# Patient Record
Sex: MALE | Race: WHITE | NOT HISPANIC OR LATINO | Employment: OTHER | URBAN - METROPOLITAN AREA
[De-identification: names, ages, dates, MRNs, and addresses within clinical notes are randomized per-mention and may not be internally consistent; named-entity substitution may affect disease eponyms.]

---

## 2017-07-28 ENCOUNTER — OFFICE VISIT (OUTPATIENT)
Dept: CARDIOLOGY | Facility: CLINIC | Age: 77
End: 2017-07-28

## 2017-07-28 VITALS
HEIGHT: 70 IN | BODY MASS INDEX: 33.79 KG/M2 | DIASTOLIC BLOOD PRESSURE: 68 MMHG | WEIGHT: 236 LBS | SYSTOLIC BLOOD PRESSURE: 118 MMHG | HEART RATE: 83 BPM

## 2017-07-28 DIAGNOSIS — I10 ESSENTIAL HYPERTENSION: ICD-10-CM

## 2017-07-28 DIAGNOSIS — E78.2 MIXED HYPERLIPIDEMIA: ICD-10-CM

## 2017-07-28 DIAGNOSIS — I20.9 ANGINA PECTORIS (HCC): Primary | ICD-10-CM

## 2017-07-28 PROCEDURE — 99214 OFFICE O/P EST MOD 30 MIN: CPT | Performed by: INTERNAL MEDICINE

## 2017-07-28 NOTE — PROGRESS NOTES
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    Chief Complaint   Patient presents with   • Coronary Artery Disease   • Hypertension       PROBLEM LIST:   1. Coronary artery disease:  a. January 2005: Stress Cardiolite Samuel Mathias with fixed inferior defect, diaphragmatic attenuation could not be ruled out, 11 minutes 43 seconds via Thom protocol. EF 63%.   b. August 2006: Left heart catheterization with PCI and stenting, 99% LAD stenosis, 3.0x28 mm CYPHER drug-eluting stent post-dilated to 3.5 mm by Bebeto Zhu and Shi, nonobstructive disease otherwise, mild flow limitation of jailed diagonal.  c. February 2012 - MPS per [facility] with normal perfusion and ejection fraction preserved.  2. Dyslipidemia:  a. April 2012 - Total cholesterol 104, triglycerides 59, HDL 45, LDL 47. Normal LFTs.   b. April 2013 - Total cholesterol 115, triglycerides 91, HDL 39, LDL 58.  3. Shortness of breath:  a. PFTs within normal limits, 2007.   4. Hypertension, presumed essential.  5. 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  6. Arthritis not otherwise specified.  7. Prostatism, recent implementation of alpha blockade.  8. Gastroesophageal reflux disease.  9. Mildly elevated transaminases.  10. Surgical history:  a. Left rotator cuff repair 1990.  b. Herniorrhaphy 1988.   c. Arthroscopic knee surgery.  d. Right rotator cuff repair, June 2015.    Allergies  Allergies   Allergen Reactions   • Other      Dual antiplatelet therapy, spontaneous bruising as of June 2010       Current Medications    Current Outpatient Prescriptions:   •  amLODIPine (NORVASC) 5 MG tablet, Take  by mouth 2 (two) times a day. 5 mg QAM 10 mg QHS, Disp: , Rfl:   •  aspirin 325 MG tablet, Take 325 mg by mouth daily., Disp: , Rfl:   •  finasteride (PROSCAR) 5 MG tablet, Take 5 mg by mouth daily., Disp: , Rfl:   •  ketotifen  "(ZADITOR) 0.025 % ophthalmic solution, 1 drop as needed., Disp: , Rfl:   •  Loratadine 10 MG capsule, Take  by mouth as needed., Disp: , Rfl:   •  methylcellulose oral powder, Take  by mouth daily., Disp: , Rfl:   •  Multiple Vitamins-Minerals (MULTIVITAMIN ADULT PO), Take  by mouth daily., Disp: , Rfl:   •  ramipril (ALTACE) 10 MG capsule, TAKE ONE CAPSULE BY MOUTH EVERY DAY, Disp: 90 capsule, Rfl: 3  •  ranitidine (ZANTAC) 150 MG tablet, Take 150 mg by mouth every night., Disp: , Rfl:   •  simvastatin (ZOCOR) 10 MG tablet, TAKE 1 TABLET DAILY., Disp: 90 tablet, Rfl: 3  •  tamsulosin (FLOMAX) 0.4 MG capsule 24 hr capsule, Take 1 capsule by mouth every night., Disp: , Rfl:     History of Present Illness     Patient presents in follow up. Increased beckman and some chest fullness w exercise.  Has stopped exercising and playing golf.  No ntg use.  ROS:  All systems have been reviewed and are negative with the exception of those mentioned in the HPI.    OBJECTIVE:  Vitals:    07/28/17 1544   BP: 118/68   BP Location: Right arm   Patient Position: Sitting   Pulse: 83   Weight: 236 lb (107 kg)   Height: 70\" (177.8 cm)     Physical Exam   Constitutional: He is oriented to person, place, and time. He appears well-developed and well-nourished. No distress.   Neck: Normal range of motion. Neck supple. No hepatojugular reflux and no JVD present. Carotid bruit is not present. No tracheal deviation present. No thyromegaly present.   Cardiovascular: Normal rate, regular rhythm, S1 normal, S2 normal, intact distal pulses and normal pulses.  PMI is not displaced.  Exam reveals no gallop, no distant heart sounds, no friction rub, no midsystolic click and no opening snap.    No murmur heard.  Pulses:       Radial pulses are 2+ on the right side, and 2+ on the left side.        Dorsalis pedis pulses are 2+ on the right side, and 2+ on the left side.        Posterior tibial pulses are 2+ on the right side, and 2+ on the left side. "   Pulmonary/Chest: Effort normal and breath sounds normal. He has no wheezes. He has no rales.   Abdominal: Soft. Bowel sounds are normal. He exhibits no mass. There is no tenderness. There is no guarding.   Musculoskeletal: Normal range of motion. He exhibits no edema or tenderness.   Neurological: He is alert and oriented to person, place, and time.   Psychiatric: He has a normal mood and affect. His behavior is normal.   Nursing note and vitals reviewed.      Diagnostic Data:  Procedures      ASSESSMENT:   Diagnosis Plan   1. Angina pectoris     2. Essential hypertension     3. Mixed hyperlipidemia         PLAN:  1. anginal equivalent Ischemic eval with stress testing.  2. Well controlled today and at home per report. Continue current regimen.  3. Request most recent lipid panel from Dr. Dickey for continuity. Continue statin therapy.    Markus Zhu MD , I independently performed the history and physical examination and developed the assessment and plan for this visit.    Markus Dickey MD, thank you for referring Mr. Newell for evaluation.  I have forwarded my electronically generated recommendations to you for review.  Please do not hesitate to call with any questions.      Markus Zhu MD, FACC

## 2017-08-22 ENCOUNTER — HOSPITAL ENCOUNTER (OUTPATIENT)
Dept: CARDIOLOGY | Facility: HOSPITAL | Age: 77
Discharge: HOME | End: 2017-08-22
Attending: INTERNAL MEDICINE

## 2017-08-22 ENCOUNTER — HOSPITAL ENCOUNTER (OUTPATIENT)
Dept: CARDIOLOGY | Facility: HOSPITAL | Age: 77
Discharge: HOME | End: 2017-08-22
Attending: INTERNAL MEDICINE | Admitting: INTERNAL MEDICINE

## 2017-08-22 VITALS — WEIGHT: 230 LBS | BODY MASS INDEX: 32.93 KG/M2 | HEIGHT: 70 IN

## 2017-08-22 DIAGNOSIS — I20.9 ANGINA PECTORIS (HCC): ICD-10-CM

## 2017-08-22 LAB
BH CV STRESS BP STAGE 1: NORMAL
BH CV STRESS BP STAGE 2: NORMAL
BH CV STRESS BP STAGE 3: NORMAL
BH CV STRESS DURATION MIN STAGE 1: 3
BH CV STRESS DURATION MIN STAGE 2: 3
BH CV STRESS DURATION MIN STAGE 3: 1
BH CV STRESS DURATION SEC STAGE 1: 0
BH CV STRESS DURATION SEC STAGE 2: 0
BH CV STRESS DURATION SEC STAGE 3: 11
BH CV STRESS GRADE STAGE 1: 10
BH CV STRESS GRADE STAGE 2: 12
BH CV STRESS GRADE STAGE 3: 14
BH CV STRESS HR STAGE 1: 116
BH CV STRESS HR STAGE 2: 25
BH CV STRESS HR STAGE 3: 130
BH CV STRESS METS STAGE 1: 5
BH CV STRESS METS STAGE 2: 7.5
BH CV STRESS METS STAGE 3: 10
BH CV STRESS PROTOCOL 1: NORMAL
BH CV STRESS RECOVERY BP: NORMAL MMHG
BH CV STRESS RECOVERY HR: 101 BPM
BH CV STRESS SPEED STAGE 1: 1.7
BH CV STRESS SPEED STAGE 2: 2.5
BH CV STRESS SPEED STAGE 3: 3.4
BH CV STRESS STAGE 1: 1
BH CV STRESS STAGE 2: 2
BH CV STRESS STAGE 3: 3
LV EF NUC BP: 70 %
MAXIMAL PREDICTED HEART RATE: 143 BPM
PERCENT MAX PREDICTED HR: 90.91 %
STRESS BASELINE BP: NORMAL MMHG
STRESS BASELINE HR: 87 BPM
STRESS PERCENT HR: 107 %
STRESS POST ESTIMATED WORKLOAD: 8.8 METS
STRESS POST EXERCISE DUR MIN: 7 MIN
STRESS POST EXERCISE DUR SEC: 11 SEC
STRESS POST PEAK BP: NORMAL MMHG
STRESS POST PEAK HR: 130 BPM
STRESS TARGET HR: 122 BPM

## 2017-08-22 PROCEDURE — 0 TECHNETIUM SESTAMIBI: Performed by: INTERNAL MEDICINE

## 2017-08-22 PROCEDURE — 93017 CV STRESS TEST TRACING ONLY: CPT

## 2017-08-22 PROCEDURE — 78452 HT MUSCLE IMAGE SPECT MULT: CPT | Performed by: INTERNAL MEDICINE

## 2017-08-22 PROCEDURE — A9500 TC99M SESTAMIBI: HCPCS | Performed by: INTERNAL MEDICINE

## 2017-08-22 PROCEDURE — 93018 CV STRESS TEST I&R ONLY: CPT | Performed by: INTERNAL MEDICINE

## 2017-08-22 PROCEDURE — 78452 HT MUSCLE IMAGE SPECT MULT: CPT

## 2017-08-22 RX ADMIN — TECHNETIUM TC-99M SESTAMIBI 1 DOSE: 1 INJECTION INTRAVENOUS at 07:35

## 2017-08-22 RX ADMIN — TECHNETIUM TC-99M SESTAMIBI 1 DOSE: 1 INJECTION INTRAVENOUS at 09:10

## 2017-08-23 DIAGNOSIS — R94.39 ABNORMAL MYOCARDIAL PERFUSION STUDY: Primary | ICD-10-CM

## 2017-08-24 ENCOUNTER — PREP FOR SURGERY (OUTPATIENT)
Dept: OTHER | Facility: HOSPITAL | Age: 77
End: 2017-08-24

## 2017-08-24 DIAGNOSIS — I20.9 ANGINA PECTORIS (HCC): Primary | ICD-10-CM

## 2017-08-24 PROBLEM — R94.39 ABNORMAL MYOCARDIAL PERFUSION STUDY: Status: ACTIVE | Noted: 2017-08-24

## 2017-08-24 RX ORDER — SODIUM CHLORIDE 0.9 % (FLUSH) 0.9 %
1-10 SYRINGE (ML) INJECTION AS NEEDED
Status: CANCELLED | OUTPATIENT
Start: 2017-08-24

## 2017-08-24 RX ORDER — ASPIRIN 325 MG
325 TABLET ORAL ONCE
Status: CANCELLED | OUTPATIENT
Start: 2017-08-24 | End: 2017-08-24

## 2017-08-24 RX ORDER — NITROGLYCERIN 0.4 MG/1
0.4 TABLET SUBLINGUAL
Status: CANCELLED | OUTPATIENT
Start: 2017-08-24

## 2017-08-24 RX ORDER — ASPIRIN 325 MG
325 TABLET, DELAYED RELEASE (ENTERIC COATED) ORAL DAILY
Status: CANCELLED | OUTPATIENT
Start: 2017-08-25

## 2017-08-24 RX ORDER — ACETAMINOPHEN 325 MG/1
650 TABLET ORAL EVERY 4 HOURS PRN
Status: CANCELLED | OUTPATIENT
Start: 2017-08-24

## 2017-08-24 RX ORDER — ONDANSETRON 2 MG/ML
4 INJECTION INTRAMUSCULAR; INTRAVENOUS EVERY 6 HOURS PRN
Status: CANCELLED | OUTPATIENT
Start: 2017-08-24

## 2017-08-28 ENCOUNTER — HOSPITAL ENCOUNTER (OUTPATIENT)
Facility: HOSPITAL | Age: 77
Discharge: HOME | End: 2017-08-29
Attending: INTERNAL MEDICINE | Admitting: INTERNAL MEDICINE

## 2017-08-28 DIAGNOSIS — R94.39 ABNORMAL MYOCARDIAL PERFUSION STUDY: ICD-10-CM

## 2017-08-28 LAB
ACT BLD: 312 SECONDS (ref 82–152)
ALBUMIN SERPL-MCNC: 4.2 G/DL (ref 3.2–4.8)
ALBUMIN/GLOB SERPL: 1.6 G/DL (ref 1.5–2.5)
ALP SERPL-CCNC: 95 U/L (ref 25–100)
ALT SERPL W P-5'-P-CCNC: 68 U/L (ref 7–40)
ANION GAP SERPL CALCULATED.3IONS-SCNC: 6 MMOL/L (ref 3–11)
ARTICHOKE IGE QN: 66 MG/DL (ref 0–130)
AST SERPL-CCNC: 39 U/L (ref 0–33)
BASOPHILS # BLD AUTO: 0.04 10*3/MM3 (ref 0–0.2)
BASOPHILS NFR BLD AUTO: 0.4 % (ref 0–1)
BILIRUB SERPL-MCNC: 0.7 MG/DL (ref 0.3–1.2)
BUN BLD-MCNC: 9 MG/DL (ref 9–23)
BUN/CREAT SERPL: 11.3 (ref 7–25)
CALCIUM SPEC-SCNC: 9.1 MG/DL (ref 8.7–10.4)
CHLORIDE SERPL-SCNC: 107 MMOL/L (ref 99–109)
CHOLEST SERPL-MCNC: 125 MG/DL (ref 0–200)
CO2 SERPL-SCNC: 23 MMOL/L (ref 20–31)
CREAT BLD-MCNC: 0.8 MG/DL (ref 0.6–1.3)
DEPRECATED RDW RBC AUTO: 42.3 FL (ref 37–54)
EOSINOPHIL # BLD AUTO: 0.2 10*3/MM3 (ref 0–0.3)
EOSINOPHIL NFR BLD AUTO: 2.2 % (ref 0–3)
ERYTHROCYTE [DISTWIDTH] IN BLOOD BY AUTOMATED COUNT: 12.8 % (ref 11.3–14.5)
GFR SERPL CREATININE-BSD FRML MDRD: 94 ML/MIN/1.73
GLOBULIN UR ELPH-MCNC: 2.6 GM/DL
GLUCOSE BLD-MCNC: 134 MG/DL (ref 70–100)
HBA1C MFR BLD: 6.4 % (ref 4.8–5.6)
HCT VFR BLD AUTO: 45.7 % (ref 38.9–50.9)
HDLC SERPL-MCNC: 41 MG/DL (ref 40–60)
HGB BLD-MCNC: 15.9 G/DL (ref 13.1–17.5)
IMM GRANULOCYTES # BLD: 0.05 10*3/MM3 (ref 0–0.03)
IMM GRANULOCYTES NFR BLD: 0.6 % (ref 0–0.6)
LYMPHOCYTES # BLD AUTO: 1.61 10*3/MM3 (ref 0.6–4.8)
LYMPHOCYTES NFR BLD AUTO: 17.9 % (ref 24–44)
MCH RBC QN AUTO: 31.5 PG (ref 27–31)
MCHC RBC AUTO-ENTMCNC: 34.8 G/DL (ref 32–36)
MCV RBC AUTO: 90.5 FL (ref 80–99)
MONOCYTES # BLD AUTO: 0.84 10*3/MM3 (ref 0–1)
MONOCYTES NFR BLD AUTO: 9.3 % (ref 0–12)
NEUTROPHILS # BLD AUTO: 6.27 10*3/MM3 (ref 1.5–8.3)
NEUTROPHILS NFR BLD AUTO: 69.6 % (ref 41–71)
PLATELET # BLD AUTO: 197 10*3/MM3 (ref 150–450)
PMV BLD AUTO: 9.9 FL (ref 6–12)
POTASSIUM BLD-SCNC: 3.7 MMOL/L (ref 3.5–5.5)
PROT SERPL-MCNC: 6.8 G/DL (ref 5.7–8.2)
RBC # BLD AUTO: 5.05 10*6/MM3 (ref 4.2–5.76)
SODIUM BLD-SCNC: 136 MMOL/L (ref 132–146)
TRIGL SERPL-MCNC: 102 MG/DL (ref 0–150)
WBC NRBC COR # BLD: 9.01 10*3/MM3 (ref 3.5–10.8)

## 2017-08-28 PROCEDURE — C1725 CATH, TRANSLUMIN NON-LASER: HCPCS | Performed by: INTERNAL MEDICINE

## 2017-08-28 PROCEDURE — S0260 H&P FOR SURGERY: HCPCS | Performed by: INTERNAL MEDICINE

## 2017-08-28 PROCEDURE — C1887 CATHETER, GUIDING: HCPCS | Performed by: INTERNAL MEDICINE

## 2017-08-28 PROCEDURE — 36415 COLL VENOUS BLD VENIPUNCTURE: CPT

## 2017-08-28 PROCEDURE — C1769 GUIDE WIRE: HCPCS | Performed by: INTERNAL MEDICINE

## 2017-08-28 PROCEDURE — G0378 HOSPITAL OBSERVATION PER HR: HCPCS

## 2017-08-28 PROCEDURE — 83036 HEMOGLOBIN GLYCOSYLATED A1C: CPT | Performed by: PHYSICIAN ASSISTANT

## 2017-08-28 PROCEDURE — 25010000002 MIDAZOLAM PER 1 MG: Performed by: INTERNAL MEDICINE

## 2017-08-28 PROCEDURE — 80053 COMPREHEN METABOLIC PANEL: CPT | Performed by: INTERNAL MEDICINE

## 2017-08-28 PROCEDURE — C1874 STENT, COATED/COV W/DEL SYS: HCPCS | Performed by: INTERNAL MEDICINE

## 2017-08-28 PROCEDURE — 92928 PRQ TCAT PLMT NTRAC ST 1 LES: CPT | Performed by: INTERNAL MEDICINE

## 2017-08-28 PROCEDURE — C9600 PERC DRUG-EL COR STENT SING: HCPCS | Performed by: INTERNAL MEDICINE

## 2017-08-28 PROCEDURE — 0 IOPAMIDOL PER 1 ML: Performed by: INTERNAL MEDICINE

## 2017-08-28 PROCEDURE — 93458 L HRT ARTERY/VENTRICLE ANGIO: CPT | Performed by: INTERNAL MEDICINE

## 2017-08-28 PROCEDURE — 85347 COAGULATION TIME ACTIVATED: CPT

## 2017-08-28 PROCEDURE — 25010000002 HEPARIN (PORCINE) PER 1000 UNITS: Performed by: INTERNAL MEDICINE

## 2017-08-28 PROCEDURE — 85025 COMPLETE CBC W/AUTO DIFF WBC: CPT | Performed by: INTERNAL MEDICINE

## 2017-08-28 PROCEDURE — C1894 INTRO/SHEATH, NON-LASER: HCPCS | Performed by: INTERNAL MEDICINE

## 2017-08-28 PROCEDURE — 80061 LIPID PANEL: CPT | Performed by: PHYSICIAN ASSISTANT

## 2017-08-28 DEVICE — XIENCE ALPINE EVEROLIMUS ELUTING CORONARY STENT SYSTEM 3.50 MM X 23 MM / RAPID-EXCHANGE
Type: IMPLANTABLE DEVICE | Status: FUNCTIONAL
Brand: XIENCE ALPINE

## 2017-08-28 DEVICE — XIENCE ALPINE EVEROLIMUS ELUTING CORONARY STENT SYSTEM 3.00 MM X 38 MM / RAPID-EXCHANGE
Type: IMPLANTABLE DEVICE | Status: FUNCTIONAL
Brand: XIENCE ALPINE

## 2017-08-28 RX ORDER — ASPIRIN 325 MG
325 TABLET ORAL NIGHTLY
Status: DISCONTINUED | OUTPATIENT
Start: 2017-08-28 | End: 2017-08-29 | Stop reason: HOSPADM

## 2017-08-28 RX ORDER — CLOPIDOGREL BISULFATE 75 MG/1
75 TABLET ORAL DAILY
Status: DISCONTINUED | OUTPATIENT
Start: 2017-08-29 | End: 2017-08-29 | Stop reason: HOSPADM

## 2017-08-28 RX ORDER — LIDOCAINE HYDROCHLORIDE 10 MG/ML
INJECTION, SOLUTION INFILTRATION; PERINEURAL AS NEEDED
Status: DISCONTINUED | OUTPATIENT
Start: 2017-08-28 | End: 2017-08-28 | Stop reason: HOSPADM

## 2017-08-28 RX ORDER — SODIUM CHLORIDE 0.9 % (FLUSH) 0.9 %
1-10 SYRINGE (ML) INJECTION AS NEEDED
Status: DISCONTINUED | OUTPATIENT
Start: 2017-08-28 | End: 2017-08-28 | Stop reason: HOSPADM

## 2017-08-28 RX ORDER — TAMSULOSIN HYDROCHLORIDE 0.4 MG/1
0.4 CAPSULE ORAL NIGHTLY
Status: DISCONTINUED | OUTPATIENT
Start: 2017-08-28 | End: 2017-08-29 | Stop reason: HOSPADM

## 2017-08-28 RX ORDER — ATORVASTATIN CALCIUM 10 MG/1
10 TABLET, FILM COATED ORAL NIGHTLY
Status: DISCONTINUED | OUTPATIENT
Start: 2017-08-28 | End: 2017-08-29 | Stop reason: HOSPADM

## 2017-08-28 RX ORDER — FINASTERIDE 5 MG/1
5 TABLET, FILM COATED ORAL NIGHTLY
Status: DISCONTINUED | OUTPATIENT
Start: 2017-08-28 | End: 2017-08-29 | Stop reason: HOSPADM

## 2017-08-28 RX ORDER — CARVEDILOL 3.12 MG/1
3.12 TABLET ORAL 2 TIMES DAILY
Status: DISCONTINUED | OUTPATIENT
Start: 2017-08-28 | End: 2017-08-29 | Stop reason: HOSPADM

## 2017-08-28 RX ORDER — ASPIRIN 325 MG
325 TABLET ORAL ONCE
Status: COMPLETED | OUTPATIENT
Start: 2017-08-28 | End: 2017-08-28

## 2017-08-28 RX ORDER — FAMOTIDINE 20 MG/1
20 TABLET, FILM COATED ORAL EVERY 12 HOURS SCHEDULED
Status: DISCONTINUED | OUTPATIENT
Start: 2017-08-28 | End: 2017-08-29 | Stop reason: HOSPADM

## 2017-08-28 RX ORDER — SODIUM CHLORIDE 9 MG/ML
100 INJECTION, SOLUTION INTRAVENOUS CONTINUOUS
Status: ACTIVE | OUTPATIENT
Start: 2017-08-28 | End: 2017-08-28

## 2017-08-28 RX ORDER — ASPIRIN 325 MG
325 TABLET, DELAYED RELEASE (ENTERIC COATED) ORAL DAILY
Status: DISCONTINUED | OUTPATIENT
Start: 2017-08-29 | End: 2017-08-28 | Stop reason: HOSPADM

## 2017-08-28 RX ORDER — NITROGLYCERIN 0.4 MG/1
0.4 TABLET SUBLINGUAL
Status: DISCONTINUED | OUTPATIENT
Start: 2017-08-28 | End: 2017-08-28 | Stop reason: HOSPADM

## 2017-08-28 RX ORDER — CLOPIDOGREL BISULFATE 75 MG/1
TABLET ORAL AS NEEDED
Status: DISCONTINUED | OUTPATIENT
Start: 2017-08-28 | End: 2017-08-28 | Stop reason: HOSPADM

## 2017-08-28 RX ORDER — RAMIPRIL 5 MG/1
10 CAPSULE ORAL NIGHTLY
Status: DISCONTINUED | OUTPATIENT
Start: 2017-08-28 | End: 2017-08-29 | Stop reason: HOSPADM

## 2017-08-28 RX ORDER — MIDAZOLAM HYDROCHLORIDE 1 MG/ML
INJECTION INTRAMUSCULAR; INTRAVENOUS AS NEEDED
Status: DISCONTINUED | OUTPATIENT
Start: 2017-08-28 | End: 2017-08-28 | Stop reason: HOSPADM

## 2017-08-28 RX ORDER — ONDANSETRON 2 MG/ML
4 INJECTION INTRAMUSCULAR; INTRAVENOUS EVERY 6 HOURS PRN
Status: DISCONTINUED | OUTPATIENT
Start: 2017-08-28 | End: 2017-08-28 | Stop reason: HOSPADM

## 2017-08-28 RX ORDER — ACETAMINOPHEN 325 MG/1
650 TABLET ORAL EVERY 4 HOURS PRN
Status: DISCONTINUED | OUTPATIENT
Start: 2017-08-28 | End: 2017-08-29 | Stop reason: HOSPADM

## 2017-08-28 RX ORDER — HEPARIN SODIUM 1000 [USP'U]/ML
INJECTION, SOLUTION INTRAVENOUS; SUBCUTANEOUS AS NEEDED
Status: DISCONTINUED | OUTPATIENT
Start: 2017-08-28 | End: 2017-08-28 | Stop reason: HOSPADM

## 2017-08-28 RX ORDER — ACETAMINOPHEN 325 MG/1
650 TABLET ORAL EVERY 4 HOURS PRN
Status: DISCONTINUED | OUTPATIENT
Start: 2017-08-28 | End: 2017-08-28 | Stop reason: HOSPADM

## 2017-08-28 RX ADMIN — FAMOTIDINE 20 MG: 20 TABLET, FILM COATED ORAL at 12:57

## 2017-08-28 RX ADMIN — SODIUM CHLORIDE 100 ML/HR: 9 INJECTION, SOLUTION INTRAVENOUS at 12:43

## 2017-08-28 RX ADMIN — ATORVASTATIN CALCIUM 10 MG: 10 TABLET, FILM COATED ORAL at 20:41

## 2017-08-28 RX ADMIN — ASPIRIN 325 MG ORAL TABLET 325 MG: 325 PILL ORAL at 08:18

## 2017-08-28 RX ADMIN — CARVEDILOL 3.12 MG: 3.12 TABLET, FILM COATED ORAL at 12:57

## 2017-08-29 ENCOUNTER — DOCUMENTATION (OUTPATIENT)
Dept: CARDIAC REHAB | Facility: HOSPITAL | Age: 77
End: 2017-08-29

## 2017-08-29 VITALS
SYSTOLIC BLOOD PRESSURE: 136 MMHG | BODY MASS INDEX: 33.42 KG/M2 | RESPIRATION RATE: 16 BRPM | DIASTOLIC BLOOD PRESSURE: 81 MMHG | OXYGEN SATURATION: 94 % | HEIGHT: 70 IN | HEART RATE: 95 BPM | TEMPERATURE: 98.8 F | WEIGHT: 233.47 LBS

## 2017-08-29 PROBLEM — R94.39 ABNORMAL MYOCARDIAL PERFUSION STUDY: Status: RESOLVED | Noted: 2017-08-24 | Resolved: 2017-08-29

## 2017-08-29 LAB
ANION GAP SERPL CALCULATED.3IONS-SCNC: 5 MMOL/L (ref 3–11)
BUN BLD-MCNC: 9 MG/DL (ref 9–23)
BUN/CREAT SERPL: 11.3 (ref 7–25)
CALCIUM SPEC-SCNC: 9.3 MG/DL (ref 8.7–10.4)
CHLORIDE SERPL-SCNC: 105 MMOL/L (ref 99–109)
CO2 SERPL-SCNC: 31 MMOL/L (ref 20–31)
CREAT BLD-MCNC: 0.8 MG/DL (ref 0.6–1.3)
DEPRECATED RDW RBC AUTO: 44 FL (ref 37–54)
ERYTHROCYTE [DISTWIDTH] IN BLOOD BY AUTOMATED COUNT: 13.2 % (ref 11.3–14.5)
GFR SERPL CREATININE-BSD FRML MDRD: 94 ML/MIN/1.73
GLUCOSE BLD-MCNC: 125 MG/DL (ref 70–100)
HCT VFR BLD AUTO: 43.2 % (ref 38.9–50.9)
HGB BLD-MCNC: 14.8 G/DL (ref 13.1–17.5)
MCH RBC QN AUTO: 31.6 PG (ref 27–31)
MCHC RBC AUTO-ENTMCNC: 34.3 G/DL (ref 32–36)
MCV RBC AUTO: 92.3 FL (ref 80–99)
PLATELET # BLD AUTO: 203 10*3/MM3 (ref 150–450)
PMV BLD AUTO: 10.6 FL (ref 6–12)
POTASSIUM BLD-SCNC: 4.1 MMOL/L (ref 3.5–5.5)
RBC # BLD AUTO: 4.68 10*6/MM3 (ref 4.2–5.76)
SODIUM BLD-SCNC: 141 MMOL/L (ref 132–146)
WBC NRBC COR # BLD: 9.92 10*3/MM3 (ref 3.5–10.8)

## 2017-08-29 PROCEDURE — 80048 BASIC METABOLIC PNL TOTAL CA: CPT | Performed by: INTERNAL MEDICINE

## 2017-08-29 PROCEDURE — 99213 OFFICE O/P EST LOW 20 MIN: CPT | Performed by: PHYSICIAN ASSISTANT

## 2017-08-29 PROCEDURE — 93010 ELECTROCARDIOGRAM REPORT: CPT | Performed by: INTERNAL MEDICINE

## 2017-08-29 PROCEDURE — G0378 HOSPITAL OBSERVATION PER HR: HCPCS

## 2017-08-29 PROCEDURE — 85027 COMPLETE CBC AUTOMATED: CPT | Performed by: INTERNAL MEDICINE

## 2017-08-29 PROCEDURE — 93005 ELECTROCARDIOGRAM TRACING: CPT | Performed by: INTERNAL MEDICINE

## 2017-08-29 RX ORDER — CARVEDILOL 3.12 MG/1
3.12 TABLET ORAL 2 TIMES DAILY
Qty: 180 TABLET | Refills: 3 | Status: SHIPPED | OUTPATIENT
Start: 2017-08-29 | End: 2017-10-06 | Stop reason: SDUPTHER

## 2017-08-29 RX ORDER — CLOPIDOGREL BISULFATE 75 MG/1
75 TABLET ORAL DAILY
Qty: 90 TABLET | Refills: 3 | Status: SHIPPED | OUTPATIENT
Start: 2017-08-29 | End: 2018-08-08 | Stop reason: SDUPTHER

## 2017-08-29 RX ORDER — ATORVASTATIN CALCIUM 10 MG/1
10 TABLET, FILM COATED ORAL NIGHTLY
Qty: 90 TABLET | Refills: 3 | Status: SHIPPED | OUTPATIENT
Start: 2017-08-29 | End: 2018-08-08 | Stop reason: SDUPTHER

## 2017-08-29 NOTE — PROGRESS NOTES
Pt. Referred for Phase II Cardiac Rehab. Staff to contact patient via telephone to discuss program information and guidelines for Phase II CR program. Thank you.

## 2017-09-05 ENCOUNTER — DOCUMENTATION (OUTPATIENT)
Dept: CARDIAC REHAB | Facility: HOSPITAL | Age: 77
End: 2017-09-05

## 2017-09-05 NOTE — PROGRESS NOTES
Pt. Referred for Phase II Cardiac Rehab. Staff discussed benefits of exercise, program protocol, and educational material provided. Teach back verified.  Permission granted from patient for staff to fax referral information to outlying program at this time.  Staff to fax referral info to [facility].

## 2017-10-06 ENCOUNTER — OFFICE VISIT (OUTPATIENT)
Dept: CARDIOLOGY | Facility: CLINIC | Age: 77
End: 2017-10-06

## 2017-10-06 VITALS
BODY MASS INDEX: 33.5 KG/M2 | SYSTOLIC BLOOD PRESSURE: 152 MMHG | HEART RATE: 71 BPM | WEIGHT: 234 LBS | HEIGHT: 70 IN | DIASTOLIC BLOOD PRESSURE: 84 MMHG

## 2017-10-06 DIAGNOSIS — I25.10 CORONARY ARTERY DISEASE INVOLVING NATIVE CORONARY ARTERY OF NATIVE HEART WITHOUT ANGINA PECTORIS: Primary | ICD-10-CM

## 2017-10-06 DIAGNOSIS — E78.5 DYSLIPIDEMIA: ICD-10-CM

## 2017-10-06 DIAGNOSIS — I10 ESSENTIAL HYPERTENSION: ICD-10-CM

## 2017-10-06 PROCEDURE — 99213 OFFICE O/P EST LOW 20 MIN: CPT | Performed by: INTERNAL MEDICINE

## 2017-10-06 RX ORDER — CARVEDILOL 6.25 MG/1
6.25 TABLET ORAL 2 TIMES DAILY
Qty: 60 TABLET | Refills: 6 | Status: SHIPPED | OUTPATIENT
Start: 2017-10-06 | End: 2018-04-29 | Stop reason: SDUPTHER

## 2017-10-06 NOTE — PROGRESS NOTES
Encounter Date:10/06/2017      Patient ID: Shady Newell is a 77 y.o. male.    Chief Complaint: Coronary Artery Disease and Shortness of Breath    PROBLEM LIST:  1. Coronary artery disease  a. January 2005: Stress Cardiolite x-ray Mathias with mixed inferior defect, diaphragmatic attenuation could not be ruled out, 11 minutes 43 seconds through Thom protocol.  EF 60%.  b. August 2006: [facility] with PCI and stent, 99% stenosis LAD, 3 x 28 Cypher MARIAA postdilated to 3.5 mm by Dr. Zhu and Shi, nonobstructive disease otherwise mild flow limitation of jailed diagonal.  c. February 2012-MPS per [facility] with normal perfusion and EF preserved.  d. MPS 8/22/2017: Abnormal exercise Cardiolite with small moderately reversible basolateral defect.  Abnormal baseline EKG without significant changes, occasional PACs and PVCs.  LVEF normal >70%.  e. [facility] 8/28/2017: Sequential 7090% stenosis of mid to distal RCA treated with 2 overlapping MARIAA stents and reduced to 0%.  Patent stent of LAD.  Residual nonocclusive disease of apical LAD and right PDA.  EF 60%.  2. Dyspnea  3. Hypertension  4. Dyslipidemia  5. GERD  6. Exogenous obesity, BMI 34  7. Arthritis  8. Prostatism, recent upper limitation of alpha blockade.    History of Present Illness  Patient presents today for follow-up with a history of CAD, dyspnea and cardiac risk factors, status post cardiac catheterization. Since then he reports significant improvement of symptoms, feeling great from a cardiac standpoint. Does not monitor his BP at home. Denies chest pain, shortness of breath, leg swelling, palpitations, and syncope. Remains busy and active with stationary bike and treadmill exercise 15 minutes each at a time, 3 times a week.    Allergies   Allergen Reactions   • Other      Dual antiplatelet therapy, spontaneous bruising as of June 2010         Current Outpatient Prescriptions:   •  aspirin 325 MG tablet, Take 325 mg by mouth daily., Disp: , Rfl:   •   "atorvastatin (LIPITOR) 10 MG tablet, Take 1 tablet by mouth Every Night., Disp: 90 tablet, Rfl: 3  •  carvedilol (COREG) 3.125 MG tablet, Take 1 tablet by mouth 2 (Two) Times a Day., Disp: 180 tablet, Rfl: 3  •  clopidogrel (PLAVIX) 75 MG tablet, Take 1 tablet by mouth Daily., Disp: 90 tablet, Rfl: 3  •  finasteride (PROSCAR) 5 MG tablet, Take 5 mg by mouth daily., Disp: , Rfl:   •  ketotifen (ZADITOR) 0.025 % ophthalmic solution, 1 drop as needed., Disp: , Rfl:   •  Loratadine 10 MG capsule, Take 10 mg by mouth As Needed., Disp: , Rfl:   •  methylcellulose oral powder, Take 2 g by mouth Daily., Disp: , Rfl:   •  Multiple Vitamins-Minerals (MULTIVITAMIN ADULT PO), Take 1 tablet by mouth Daily., Disp: , Rfl:   •  ramipril (ALTACE) 10 MG capsule, TAKE ONE CAPSULE BY MOUTH EVERY DAY, Disp: 90 capsule, Rfl: 3  •  ranitidine (ZANTAC) 150 MG tablet, Take 150 mg by mouth every night., Disp: , Rfl:   •  tamsulosin (FLOMAX) 0.4 MG capsule 24 hr capsule, Take 1 capsule by mouth every night., Disp: , Rfl:     The following portions of the patient's history were reviewed and updated as appropriate: allergies, current medications, past family history, past medical history, past social history, past surgical history and problem list.    ROS  Review of Systems   Constitution: Negative for chills, fever, weight gain and weight loss.   Cardiovascular: Negative for chest pain, claudication, dyspnea on exertion, leg swelling, orthopnea, palpitations, paroxysmal nocturnal dyspnea and syncope.        No dizziness   Gastrointestinal: Negative for abdominal pain, constipation, diarrhea, nausea and vomiting.   Genitourinary:        No urinary symptoms   Neurological:        No symptoms of stroke.   All other systems reviewed and are negative.    Objective:     Blood pressure 152/84, pulse 71, height 70\" (177.8 cm), weight 234 lb (106 kg).  Repeat blood pressure measurement by, Nash Oliver MD, at 142/80      Physical Exam  Constitutional: " She appears well-developed and well-nourished.   HENT:   HEENT exam unremarkable.   Neck: Neck supple. No JVD present.   No carotid bruits.   Cardiovascular: Normal rate, regular rhythm and normal heart sounds.    No murmur heard.  2 plus symmetric pulses.   Pulmonary/Chest: Breath sounds normal. Does not exhibit tenderness.   Abdominal:   Abdomen benign.   Musculoskeletal: Does not exhibit edema.   Neurological:   Neurological exam unremarkable.   Vitals reviewed.    Lab Review:   Procedures       Assessment:      Diagnosis Plan   1. Coronary artery disease involving native coronary artery of native heart without angina pectoris  Stable and asymptomatic. Continue beta blocker and dual antiplatelet therapy.   2. Essential hypertension  Controlled with beta blocker and ACE inhibitor therapy.   3. Dyslipidemia  Controlled on Lipitor 10 mg.     Plan:   Stable cardiac status.  Increase Coreg to 6.25 mg twice daily.  Continue current medications.   FU in 6 MO with Dr Zhu, sooner as needed.  Thank you for allowing us to participate in the care of your patient.     Scribed for Nash Oliver MD by Erlin Cardenas. 10/6/2017  1:07 PM    INash MD, personally performed the services described in this documentation as scribed by the above named individual in my presence, and it is both accurate and complete.  10/6/2017  1:36 PM        Please note that portions of this note may have been completed with a voice recognition program. Efforts were made to edit the dictations, but occasionally words are mistranscribed.

## 2017-10-23 ENCOUNTER — OFFICE VISIT (OUTPATIENT)
Dept: ORTHOPEDIC SURGERY | Facility: CLINIC | Age: 77
End: 2017-10-23

## 2017-10-23 VITALS
HEIGHT: 69 IN | WEIGHT: 231 LBS | BODY MASS INDEX: 34.21 KG/M2 | DIASTOLIC BLOOD PRESSURE: 91 MMHG | SYSTOLIC BLOOD PRESSURE: 152 MMHG | HEART RATE: 82 BPM

## 2017-10-23 DIAGNOSIS — M17.11 PRIMARY OSTEOARTHRITIS OF RIGHT KNEE: Primary | ICD-10-CM

## 2017-10-23 PROCEDURE — 99213 OFFICE O/P EST LOW 20 MIN: CPT | Performed by: ORTHOPAEDIC SURGERY

## 2017-10-23 PROCEDURE — 20610 DRAIN/INJ JOINT/BURSA W/O US: CPT | Performed by: ORTHOPAEDIC SURGERY

## 2017-10-23 RX ORDER — TRIAMCINOLONE ACETONIDE 40 MG/ML
40 INJECTION, SUSPENSION INTRA-ARTICULAR; INTRAMUSCULAR
Status: COMPLETED | OUTPATIENT
Start: 2017-10-23 | End: 2017-10-23

## 2017-10-23 RX ORDER — NITROGLYCERIN 0.4 MG/1
TABLET SUBLINGUAL
Refills: 5 | COMMUNITY
Start: 2017-08-10 | End: 2022-05-16 | Stop reason: SDUPTHER

## 2017-10-23 RX ORDER — LIDOCAINE HYDROCHLORIDE 10 MG/ML
4 INJECTION, SOLUTION INFILTRATION; PERINEURAL
Status: COMPLETED | OUTPATIENT
Start: 2017-10-23 | End: 2017-10-23

## 2017-10-23 RX ORDER — FLUTICASONE PROPIONATE 50 MCG
SPRAY, SUSPENSION (ML) NASAL
Refills: 11 | COMMUNITY
Start: 2017-08-04 | End: 2019-06-07

## 2017-10-23 RX ADMIN — LIDOCAINE HYDROCHLORIDE 4 ML: 10 INJECTION, SOLUTION INFILTRATION; PERINEURAL at 13:45

## 2017-10-23 RX ADMIN — TRIAMCINOLONE ACETONIDE 40 MG: 40 INJECTION, SUSPENSION INTRA-ARTICULAR; INTRAMUSCULAR at 13:45

## 2017-10-23 NOTE — PROGRESS NOTES
[facility] Note    Subjective     Chief Complaint   Patient presents with   • Right Knee - Follow-up     6 month f/u          HPI    Shady Newell is a 77 y.o. male. He presents today for evaluation of right knee pain.  The pain is mild in severity, aching in quality, worse with walking and standing.  Previous injections have provided improvement.  He is nearing a trip to [place] in the near future, and would like to have an injection.      Patient Active Problem List   Diagnosis   • Dyslipidemia   • Hypertension   • Exogenous obesity   • Arthritis   • Prostatism   • GERD (gastroesophageal reflux disease)     Past Medical History:   Diagnosis Date   • Arthritis    • Chest pain    • Coronary artery disease    • Degenerative arthritis of knee    • Dyslipidemia    • GERD (gastroesophageal reflux disease)    • Hypertension    • Obesity    • Osteoarthritis    • Primary osteoarthritis of right knee    • Prostatism    • S/P arthroscopy of knee    • SOB (shortness of breath)    • Transaminase or LDH elevation       Past Surgical History:   Procedure Laterality Date   • CARDIAC CATHETERIZATION N/A 8/28/2017    Procedure: Left Heart Cath;  Surgeon: Nash Oliver MD;  Location: [facility] INVASIVE LOCATION;  Service:    • CATARACT EXTRACTION     • CORONARY ANGIOPLASTY WITH STENT PLACEMENT  2004   • HERNIA REPAIR  1988   • KNEE ARTHROSCOPY     • ROTATOR CUFF REPAIR Left 1990   • ROTATOR CUFF REPAIR Right 06/2015   • SHOULDER SURGERY     • VASECTOMY        Family History   Problem Relation Age of Onset   • Heart disease Mother    • Osteoarthritis Mother    • Hypertension Mother    • Heart attack Mother    • Hypertension Father    • Heart disease Father    • Stroke Father    • Cancer Father    • Hypertension Other    • Cancer Other      Social History     Social History   • Marital status:      Spouse name: N/A   • Number of children: N/A   • Years of education: N/A     Occupational History    • Not on file.     Social History Main Topics   • Smoking status: Former Smoker     Years: 10.00     Types: Cigarettes     Start date: 1958     Quit date: 1/28/1970   • Smokeless tobacco: Never Used   • Alcohol use 1.8 - 2.4 oz/week     3 - 4 Shots of liquor per week   • Drug use: No   • Sexual activity: Defer     Other Topics Concern   • Not on file     Social History Narrative      Current Outpatient Prescriptions on File Prior to Visit   Medication Sig Dispense Refill   • aspirin 325 MG tablet Take 325 mg by mouth daily.     • atorvastatin (LIPITOR) 10 MG tablet Take 1 tablet by mouth Every Night. 90 tablet 3   • carvedilol (COREG) 6.25 MG tablet Take 1 tablet by mouth 2 (Two) Times a Day. 60 tablet 6   • clopidogrel (PLAVIX) 75 MG tablet Take 1 tablet by mouth Daily. 90 tablet 3   • finasteride (PROSCAR) 5 MG tablet Take 5 mg by mouth daily.     • Loratadine 10 MG capsule Take 10 mg by mouth As Needed.     • methylcellulose oral powder Take 2 g by mouth Daily.     • Multiple Vitamins-Minerals (MULTIVITAMIN ADULT PO) Take 1 tablet by mouth Daily.     • ramipril (ALTACE) 10 MG capsule TAKE ONE CAPSULE BY MOUTH EVERY DAY 90 capsule 3   • ranitidine (ZANTAC) 150 MG tablet Take 150 mg by mouth every night.     • tamsulosin (FLOMAX) 0.4 MG capsule 24 hr capsule Take 1 capsule by mouth every night.     • ketotifen (ZADITOR) 0.025 % ophthalmic solution 1 drop as needed.     • simvastatin (ZOCOR) 10 MG tablet Take 10 mg by mouth Take As Directed.       No current facility-administered medications on file prior to visit.       Allergies   Allergen Reactions   • Other      Dual antiplatelet therapy, spontaneous bruising as of June 2010        Review of Systems   Constitutional: Negative for activity change, appetite change, chills, diaphoresis, fatigue, fever and unexpected weight change.   HENT: Positive for hearing loss and tinnitus. Negative for congestion, dental problem, drooling, ear discharge, ear pain, facial  "swelling, mouth sores, nosebleeds, postnasal drip, rhinorrhea, sinus pressure, sneezing, sore throat, trouble swallowing and voice change.    Eyes: Negative for photophobia, pain, discharge, redness, itching and visual disturbance.   Respiratory: Negative for apnea, cough, choking, chest tightness, shortness of breath, wheezing and stridor.    Cardiovascular: Positive for leg swelling. Negative for chest pain and palpitations.   Gastrointestinal: Negative for abdominal distention, abdominal pain, anal bleeding, blood in stool, constipation, diarrhea, nausea, rectal pain and vomiting.   Endocrine: Negative for cold intolerance, heat intolerance, polydipsia, polyphagia and polyuria.   Genitourinary: Negative for decreased urine volume, difficulty urinating, dysuria, enuresis, flank pain, frequency, genital sores, hematuria and urgency.   Musculoskeletal: Positive for arthralgias. Negative for back pain, gait problem, joint swelling, myalgias, neck pain and neck stiffness.   Skin: Negative for color change, pallor, rash and wound.   Allergic/Immunologic: Negative for environmental allergies, food allergies and immunocompromised state.   Neurological: Negative for dizziness, tremors, seizures, syncope, facial asymmetry, speech difficulty, weakness, light-headedness, numbness and headaches.   Hematological: Negative for adenopathy. Does not bruise/bleed easily.   Psychiatric/Behavioral: Negative for agitation, behavioral problems, confusion, decreased concentration, dysphoric mood, hallucinations, self-injury, sleep disturbance and suicidal ideas. The patient is not nervous/anxious and is not hyperactive.         Objective      Physical Exam  /91  Pulse 82  Ht 69\" (175.3 cm)  Wt 231 lb (105 kg)  BMI 34.11 kg/m2    Body mass index is 34.11 kg/(m^2).    General:   Mental Status:  Alert   Appearance: Cooperative, in no acute distress   Build and Nutrition: Overweight male   Orientation: Alert and oriented to " person, place and time   Posture: Normal   Gait: Normal    Integument:   Right knee: No skin lesions, no rash, no ecchymosis    Lower Extremities:   Right Knee:    Tenderness:  None    Effusion:  None    Swelling: None    Crepitus:  Positive    Range of motion:  Extension: 0°       Flexion: 110°  Instability:  No varus laxity, no valgus laxity, negative anterior drawer  Deformities:  None      Imaging/Studies    Previous radiographs reviewed, which showed arthritis in the right knee.    Assessment and Plan     Shady was seen today for follow-up.    Diagnoses and all orders for this visit:    Primary osteoarthritis of right knee        I reviewed my findings with patient today.  His right knee is bothering him somewhat, and he has an upcoming trip to [place].  He like to have an injection in his knee.  Please see my procedure note for details.    Of note, he had about 100% relief of his symptoms just a few minutes following the injection.    Return in about 6 months (around 4/23/2018).      Medical Decision Making    Data/Risk: independent visualization of imaging, lab tests, or EMG/NCV      Regan Zimmerman MD  10/23/17  1:41 PM

## 2017-10-23 NOTE — PROGRESS NOTES
Procedure   Large Joint Arthrocentesis  Date/Time: 10/23/2017 1:45 PM  Consent given by: patient  Site marked: site marked  Timeout: Immediately prior to procedure a time out was called to verify the correct patient, procedure, equipment, support staff and site/side marked as required   Supporting Documentation  Indications: pain   Procedure Details  Location: knee - R knee  Preparation: Patient was prepped and draped in the usual sterile fashion  Needle size: 22 G  Approach: anterolateral  Medications administered: 4 mL lidocaine 1 %; 40 mg triamcinolone acetonide 40 MG/ML  Patient tolerance: patient tolerated the procedure well with no immediate complications

## 2017-11-02 RX ORDER — SIMVASTATIN 10 MG
TABLET ORAL
Qty: 90 TABLET | Refills: 3 | OUTPATIENT
Start: 2017-11-02

## 2017-11-08 RX ORDER — RAMIPRIL 10 MG/1
CAPSULE ORAL
Qty: 90 CAPSULE | Refills: 3 | Status: SHIPPED | OUTPATIENT
Start: 2017-11-08 | End: 2021-03-04

## 2018-04-30 RX ORDER — CARVEDILOL 6.25 MG/1
TABLET ORAL
Qty: 60 TABLET | Refills: 6 | Status: SHIPPED | OUTPATIENT
Start: 2018-04-30 | End: 2018-11-02 | Stop reason: SDUPTHER

## 2018-06-01 ENCOUNTER — OFFICE VISIT (OUTPATIENT)
Dept: CARDIOLOGY | Facility: CLINIC | Age: 78
End: 2018-06-01

## 2018-06-01 ENCOUNTER — TELEPHONE (OUTPATIENT)
Dept: CARDIOLOGY | Facility: CLINIC | Age: 78
End: 2018-06-01

## 2018-06-01 VITALS
HEART RATE: 80 BPM | BODY MASS INDEX: 34.96 KG/M2 | DIASTOLIC BLOOD PRESSURE: 68 MMHG | HEIGHT: 69 IN | SYSTOLIC BLOOD PRESSURE: 140 MMHG | WEIGHT: 236 LBS

## 2018-06-01 DIAGNOSIS — E78.2 MIXED HYPERLIPIDEMIA: ICD-10-CM

## 2018-06-01 DIAGNOSIS — I25.10 CORONARY ARTERY DISEASE INVOLVING NATIVE CORONARY ARTERY OF NATIVE HEART WITHOUT ANGINA PECTORIS: Primary | ICD-10-CM

## 2018-06-01 DIAGNOSIS — I10 ESSENTIAL HYPERTENSION: ICD-10-CM

## 2018-06-01 DIAGNOSIS — M54.9 BACK PAIN, UNSPECIFIED BACK LOCATION, UNSPECIFIED BACK PAIN LATERALITY, UNSPECIFIED CHRONICITY: Primary | ICD-10-CM

## 2018-06-01 PROCEDURE — 99214 OFFICE O/P EST MOD 30 MIN: CPT | Performed by: INTERNAL MEDICINE

## 2018-06-01 NOTE — PROGRESS NOTES
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    Chief Complaint   Patient presents with   • Coronary Artery Disease       PROBLEM LIST:   1. Coronary artery disease:  a. January 2005: Stress Cardiolite Harrold Mathias with fixed inferior defect, diaphragmatic attenuation could not be ruled out, 11 minutes 43 seconds via Thom protocol. EF 63%.   b. August 2006: Left heart catheterization with PCI and stenting, 99% LAD stenosis, 3.0x28 mm CYPHER drug-eluting stent post-dilated to 3.5 mm by Bebeto Zhu and Shi, nonobstructive disease otherwise, mild flow limitation of jailed diagonal.  c. February 2012 - MPS per [facility] with normal perfusion and ejection fraction preserved.  d. 8/28/17 [facility] AA: 80% stenosis of the mid to distal RCA treated with 2 overlapping MARIAA, EF 60%  2. Dyslipidemia:  a. April 2012 - Total cholesterol 104, triglycerides 59, HDL 45, LDL 47. Normal LFTs.   b. April 2013 - Total cholesterol 115, triglycerides 91, HDL 39, LDL 58.  c. 8/28/17   HDL 41 LDL 66  3. Shortness of breath:  a. PFTs within normal limits, 2007.   4. Hypertension, presumed essential.  5. 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  6. Arthritis not otherwise specified.  7. Prostatism, recent implementation of alpha blockade.  8. Gastroesophageal reflux disease.  9. Mildly elevated transaminases.  10. Surgical history:  a. Left rotator cuff repair 1990.  b. Herniorrhaphy 1988.   c. Arthroscopic knee surgery.  d. Right rotator cuff repair, June 2015.    Allergies  Allergies   Allergen Reactions   • Other      Dual antiplatelet therapy, spontaneous bruising as of June 2010       Current Medications    Current Outpatient Prescriptions:   •  aspirin 325 MG tablet, Take 325 mg by mouth daily., Disp: , Rfl:   •  atorvastatin (LIPITOR) 10 MG tablet, Take 1 tablet by mouth  "Every Night., Disp: 90 tablet, Rfl: 3  •  carvedilol (COREG) 6.25 MG tablet, TAKE 1 TABLET BY MOUTH TWICE A DAY, Disp: 60 tablet, Rfl: 6  •  clopidogrel (PLAVIX) 75 MG tablet, Take 1 tablet by mouth Daily., Disp: 90 tablet, Rfl: 3  •  finasteride (PROSCAR) 5 MG tablet, Take 5 mg by mouth daily., Disp: , Rfl:   •  fluticasone (FLONASE) 50 MCG/ACT nasal spray, USE 1-2 SPRAYS IN EACH NOSTRIL ONCE DAILY, Disp: , Rfl: 11  •  ketotifen (ZADITOR) 0.025 % ophthalmic solution, 1 drop as needed., Disp: , Rfl:   •  Loratadine 10 MG capsule, Take 10 mg by mouth As Needed., Disp: , Rfl:   •  methylcellulose oral powder, Take 2 g by mouth Daily., Disp: , Rfl:   •  Multiple Vitamins-Minerals (MULTIVITAMIN ADULT PO), Take 1 tablet by mouth Daily., Disp: , Rfl:   •  nitroglycerin (NITROSTAT) 0.4 MG SL tablet, DISSOLVE 1 TABLET UNDER TONGUE ONCE EVERY 5 MINS MAX OF 3 THEN CALL 911 IF NO RELIEF, Disp: , Rfl: 5  •  ramipril (ALTACE) 10 MG capsule, TAKE ONE CAPSULE BY MOUTH EVERY DAY, Disp: 90 capsule, Rfl: 3  •  ranitidine (ZANTAC) 150 MG tablet, Take 150 mg by mouth every night., Disp: , Rfl:   •  tamsulosin (FLOMAX) 0.4 MG capsule 24 hr capsule, Take 1 capsule by mouth every night., Disp: , Rfl:     History of Present Illness     Patient presents in follow up. Increased beckman and some chest fullness w exercise.  Has stopped exercising and playing golf due to back pain.  No ntg use.  Occas petechia w DAPT.    ROS:  All systems have been reviewed and are negative with the exception of those mentioned in the HPI.    OBJECTIVE:  Vitals:    06/01/18 1013   BP: 140/68   BP Location: Left arm   Patient Position: Sitting   Pulse: 80   Weight: 107 kg (236 lb)   Height: 175.3 cm (69\")     Physical Exam   Constitutional: He is oriented to person, place, and time. He appears well-developed and well-nourished. No distress.   Neck: Normal range of motion. Neck supple. No hepatojugular reflux and no JVD present. Carotid bruit is not present. No " tracheal deviation present. No thyromegaly present.   Cardiovascular: Normal rate, regular rhythm, S1 normal, S2 normal, intact distal pulses and normal pulses.  PMI is not displaced.  Exam reveals no gallop, no distant heart sounds, no friction rub, no midsystolic click and no opening snap.    No murmur heard.  Pulses:       Radial pulses are 2+ on the right side, and 2+ on the left side.        Dorsalis pedis pulses are 2+ on the right side, and 2+ on the left side.        Posterior tibial pulses are 2+ on the right side, and 2+ on the left side.   Pulmonary/Chest: Effort normal and breath sounds normal. He has no wheezes. He has no rales.   Abdominal: Soft. Bowel sounds are normal. He exhibits no mass. There is no tenderness. There is no guarding.   Musculoskeletal: Normal range of motion. He exhibits no edema or tenderness.   Neurological: He is alert and oriented to person, place, and time.   Psychiatric: He has a normal mood and affect. His behavior is normal.   Nursing note and vitals reviewed.      Diagnostic Data:  Procedures      ASSESSMENT:   Diagnosis Plan   1. Coronary artery disease involving native coronary artery of native heart without angina pectoris     2. Essential hypertension     3. Mixed hyperlipidemia         PLAN:  1. CAD post pci, nl lvef. Cont Dapt X 2 yrs unless needs off for elective procedure.  2. Well controlled today and at home per report. Continue current regimen.  3. Request most recent lipid panel from Dr. Dickey for continuity. Continue statin therapy.  4. Referral to spine surgeon per request.    Markus Zhu MD , I independently performed the history and physical examination and developed the assessment and plan for this visit.    Markus Dickey MD, thank you for referring Mr. Newell for evaluation.  I have forwarded my electronically generated recommendations to you for review.  Please do not hesitate to call with any questions.    Scribed for Markus Zhu MD by  Melanie Coppola PA-C. 6/1/2018  10:35 AM  I, Markus Zhu MD, personally performed the services described in this documentation as scribed by the above named individual in my presence, and it is both accurate and complete.  6/1/2018  10:35 AM    Markus Zhu MD, FACC

## 2018-06-01 NOTE — TELEPHONE ENCOUNTER
Called patient and let him know appt with Dr Gonzalez is set up for June 6th at 930, arrive 15-20min early and bring any scans he has had done with him. Provided patient with address and phone number.

## 2018-08-08 RX ORDER — ATORVASTATIN CALCIUM 10 MG/1
10 TABLET, FILM COATED ORAL NIGHTLY
Qty: 90 TABLET | Refills: 0 | Status: SHIPPED | OUTPATIENT
Start: 2018-08-08 | End: 2018-11-03 | Stop reason: SDUPTHER

## 2018-08-08 RX ORDER — CLOPIDOGREL BISULFATE 75 MG/1
75 TABLET ORAL DAILY
Qty: 90 TABLET | Refills: 0 | Status: SHIPPED | OUTPATIENT
Start: 2018-08-08 | End: 2018-11-03 | Stop reason: SDUPTHER

## 2018-11-02 RX ORDER — CARVEDILOL 6.25 MG/1
6.25 TABLET ORAL 2 TIMES DAILY
Qty: 60 TABLET | Refills: 6 | Status: SHIPPED | OUTPATIENT
Start: 2018-11-02 | End: 2019-04-27 | Stop reason: SDUPTHER

## 2018-11-05 RX ORDER — ATORVASTATIN CALCIUM 10 MG/1
TABLET, FILM COATED ORAL
Qty: 90 TABLET | Refills: 2 | Status: SHIPPED | OUTPATIENT
Start: 2018-11-05 | End: 2019-07-19 | Stop reason: SDUPTHER

## 2018-11-05 RX ORDER — CLOPIDOGREL BISULFATE 75 MG/1
TABLET ORAL
Qty: 90 TABLET | Refills: 2 | Status: SHIPPED | OUTPATIENT
Start: 2018-11-05 | End: 2019-07-18 | Stop reason: SDUPTHER

## 2019-04-29 RX ORDER — CARVEDILOL 6.25 MG/1
TABLET ORAL
Qty: 90 TABLET | Refills: 2 | Status: SHIPPED | OUTPATIENT
Start: 2019-04-29 | End: 2019-07-25 | Stop reason: SDUPTHER

## 2019-06-07 ENCOUNTER — OFFICE VISIT (OUTPATIENT)
Dept: CARDIOLOGY | Facility: CLINIC | Age: 79
End: 2019-06-07

## 2019-06-07 VITALS
HEART RATE: 82 BPM | HEIGHT: 70 IN | SYSTOLIC BLOOD PRESSURE: 120 MMHG | OXYGEN SATURATION: 94 % | DIASTOLIC BLOOD PRESSURE: 68 MMHG | BODY MASS INDEX: 34.22 KG/M2 | WEIGHT: 239 LBS

## 2019-06-07 DIAGNOSIS — E78.2 MIXED HYPERLIPIDEMIA: ICD-10-CM

## 2019-06-07 DIAGNOSIS — I25.10 CORONARY ARTERY DISEASE INVOLVING NATIVE CORONARY ARTERY OF NATIVE HEART WITHOUT ANGINA PECTORIS: Primary | ICD-10-CM

## 2019-06-07 DIAGNOSIS — I10 ESSENTIAL HYPERTENSION: ICD-10-CM

## 2019-06-07 PROCEDURE — 99214 OFFICE O/P EST MOD 30 MIN: CPT | Performed by: INTERNAL MEDICINE

## 2019-06-07 NOTE — PROGRESS NOTES
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    Chief Complaint   Patient presents with   • Coronary Artery Disease       PROBLEM LIST:   1. Coronary artery disease:  a. January 2005: Stress Cardiolite Samuel Mathias with fixed inferior defect, diaphragmatic attenuation could not be ruled out, 11 minutes 43 seconds via Thom protocol. EF 63%.   b. August 2006: Left heart catheterization with PCI and stenting, 99% LAD stenosis, 3.0x28 mm CYPHER drug-eluting stent post-dilated to 3.5 mm by Bebeto Zhu and Shi, nonobstructive disease otherwise, mild flow limitation of jailed diagonal.  c. February 2012 - MPS per [facility] with normal perfusion and ejection fraction preserved.  d. 8/28/17 [facility] AA: 80% stenosis of the mid to distal RCA treated with 2 overlapping MARIAA, EF 60%  2. Dyslipidemia:  a. 11/18 114/98/41/53  3. Shortness of breath:  a. PFTs within normal limits, 2007.   4. Hypertension, presumed essential.  5. 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  6. Arthritis not otherwise specified.  7. Prostatism, recent implementation of alpha blockade.  8. Gastroesophageal reflux disease.  9. Mildly elevated transaminases.  10. Surgical history:  a. Left rotator cuff repair 1990.  b. Herniorrhaphy 1988.   c. Arthroscopic knee surgery.  d. Right rotator cuff repair, June 2015.    Allergies  Allergies   Allergen Reactions   • Other      Dual antiplatelet therapy, spontaneous bruising as of June 2010       Current Medications    Current Outpatient Medications:   •  aspirin 325 MG tablet, Take 325 mg by mouth daily., Disp: , Rfl:   •  atorvastatin (LIPITOR) 10 MG tablet, TAKE 1 TABLET BY MOUTH EVERY DAY AT NIGHT, Disp: 90 tablet, Rfl: 2  •  carvedilol (COREG) 6.25 MG tablet, TAKE 1 TABLET BY MOUTH TWICE A DAY, Disp: 90 tablet, Rfl: 2  •  clopidogrel (PLAVIX) 75 MG tablet,  "TAKE 1 TABLET BY MOUTH EVERY DAY, Disp: 90 tablet, Rfl: 2  •  finasteride (PROSCAR) 5 MG tablet, Take 5 mg by mouth daily., Disp: , Rfl:   •  ketotifen (ZADITOR) 0.025 % ophthalmic solution, 1 drop as needed., Disp: , Rfl:   •  Loratadine 10 MG capsule, Take 10 mg by mouth As Needed., Disp: , Rfl:   •  Multiple Vitamins-Minerals (MULTIVITAMIN ADULT PO), Take 1 tablet by mouth Daily., Disp: , Rfl:   •  nitroglycerin (NITROSTAT) 0.4 MG SL tablet, DISSOLVE 1 TABLET UNDER TONGUE ONCE EVERY 5 MINS MAX OF 3 THEN CALL 911 IF NO RELIEF, Disp: , Rfl: 5  •  ramipril (ALTACE) 10 MG capsule, TAKE ONE CAPSULE BY MOUTH EVERY DAY (Patient taking differently: TAKE ONE CAPSULE BY MOUTH TWICE DAILY), Disp: 90 capsule, Rfl: 3  •  ranitidine (ZANTAC) 150 MG tablet, Take 150 mg by mouth every night., Disp: , Rfl:   •  tamsulosin (FLOMAX) 0.4 MG capsule 24 hr capsule, Take 1 capsule by mouth every night., Disp: , Rfl:     History of Present Illness     Patient presents in follow up.  Current chest symptoms.  He is recently traveled to [facility] to work and is scheduled to travel to [place] later this fall.  He is continued to have occasional bruising.  He has noted some balance issues and is planning to obtain a personal exercise therapist    OBJECTIVE:  Vitals:    06/07/19 1023   BP: 120/68   BP Location: Right arm   Patient Position: Sitting   Pulse: 82   SpO2: 94%   Weight: 108 kg (239 lb)   Height: 177.8 cm (70\")     Physical Exam   Constitutional: He is oriented to person, place, and time. He appears well-developed and well-nourished. No distress.   Neck: Normal range of motion. Neck supple. No hepatojugular reflux and no JVD present. Carotid bruit is not present. No tracheal deviation present. No thyromegaly present.   Cardiovascular: Normal rate, regular rhythm, S1 normal, S2 normal, intact distal pulses and normal pulses. PMI is not displaced. Exam reveals no gallop, no distant heart sounds, no friction rub, no midsystolic " click and no opening snap.   No murmur heard.  Pulses:       Radial pulses are 2+ on the right side, and 2+ on the left side.        Dorsalis pedis pulses are 2+ on the right side, and 2+ on the left side.        Posterior tibial pulses are 2+ on the right side, and 2+ on the left side.   Pulmonary/Chest: Effort normal and breath sounds normal. He has no wheezes. He has no rales.   Abdominal: Soft. Bowel sounds are normal. He exhibits no mass. There is no tenderness. There is no guarding.   Musculoskeletal: Normal range of motion. He exhibits no edema or tenderness.   Neurological: He is alert and oriented to person, place, and time.   Psychiatric: He has a normal mood and affect. His behavior is normal.   Nursing note and vitals reviewed.      Diagnostic Data:  Procedures      ASSESSMENT:   Diagnosis Plan   1. Coronary artery disease involving native coronary artery of native heart without angina pectoris     2. Essential hypertension     3. Mixed hyperlipidemia         PLAN:  1. CAD post pci, nl lvef. Cont Dapt X 2 yrs unless needs off for elective procedure.  Decrease aspirin to 81  2.HTN  Well controlled today and at home per report. Continue current regimen of ramipril carvedilol tamsulosin  3. Reviewed most recent lipid panel from Dr. Dickey for continuity. Continue statin therapy.      Markus Zhu MD , I independently performed the history and physical examination and developed the assessment and plan for this visit.    Markus Dickey MD, thank you for referring Mr. Newell for evaluation.  I have forwarded my electronically generated recommendations to you for review.  Please do not hesitate to call with any questions.     Markus Zhu MD, [facility]

## 2019-07-18 RX ORDER — CLOPIDOGREL BISULFATE 75 MG/1
TABLET ORAL
Qty: 90 TABLET | Refills: 2 | Status: SHIPPED | OUTPATIENT
Start: 2019-07-18 | End: 2019-08-18 | Stop reason: SDUPTHER

## 2019-07-22 RX ORDER — ATORVASTATIN CALCIUM 10 MG/1
TABLET, FILM COATED ORAL
Qty: 90 TABLET | Refills: 2 | Status: SHIPPED | OUTPATIENT
Start: 2019-07-22 | End: 2020-04-16

## 2019-07-25 RX ORDER — CARVEDILOL 6.25 MG/1
TABLET ORAL
Qty: 180 TABLET | Refills: 1 | Status: SHIPPED | OUTPATIENT
Start: 2019-07-25 | End: 2020-02-07

## 2019-08-19 RX ORDER — CLOPIDOGREL BISULFATE 75 MG/1
75 TABLET ORAL DAILY
Qty: 90 TABLET | Refills: 3 | Status: SHIPPED | OUTPATIENT
Start: 2019-08-19 | End: 2020-08-05

## 2020-02-07 RX ORDER — CARVEDILOL 6.25 MG/1
TABLET ORAL
Qty: 180 TABLET | Refills: 1 | Status: SHIPPED | OUTPATIENT
Start: 2020-02-07 | End: 2020-08-06

## 2020-03-18 ENCOUNTER — PATIENT MESSAGE (OUTPATIENT)
Dept: CARDIOLOGY | Facility: CLINIC | Age: 80
End: 2020-03-18

## 2020-04-16 RX ORDER — ATORVASTATIN CALCIUM 10 MG/1
TABLET, FILM COATED ORAL
Qty: 90 TABLET | Refills: 3 | Status: SHIPPED | OUTPATIENT
Start: 2020-04-16 | End: 2021-01-11

## 2020-05-12 ENCOUNTER — TELEMEDICINE (OUTPATIENT)
Dept: CARDIOLOGY | Facility: CLINIC | Age: 80
End: 2020-05-12

## 2020-05-12 VITALS — HEIGHT: 70 IN | WEIGHT: 233 LBS | BODY MASS INDEX: 33.36 KG/M2

## 2020-05-12 DIAGNOSIS — I25.10 CORONARY ARTERY DISEASE INVOLVING NATIVE CORONARY ARTERY OF NATIVE HEART WITHOUT ANGINA PECTORIS: Primary | ICD-10-CM

## 2020-05-12 DIAGNOSIS — I10 ESSENTIAL HYPERTENSION: ICD-10-CM

## 2020-05-12 DIAGNOSIS — E78.2 MIXED HYPERLIPIDEMIA: ICD-10-CM

## 2020-05-12 PROCEDURE — 99213 OFFICE O/P EST LOW 20 MIN: CPT | Performed by: INTERNAL MEDICINE

## 2020-05-12 NOTE — PROGRESS NOTES
Shady Newell  1940  [phone]    VISIT DATE: 5/12/2020     PCP: Markus Dickey MD  [address] 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    Chief Complaint   Patient presents with   • Coronary Artery Disease       PROBLEM LIST:   1. Coronary artery disease:  a. January 2005: Stress Cardiolite Thiells Mathias with fixed inferior defect, diaphragmatic attenuation could not be ruled out, 11 minutes 43 seconds via Thom protocol. EF 63%.   b. August 2006: Left heart catheterization with PCI and stenting, 99% LAD stenosis, 3.0x28 mm CYPHER drug-eluting stent post-dilated to 3.5 mm by Bebeto Zhu and Shi, nonobstructive disease otherwise, mild flow limitation of jailed diagonal.  c. February 2012 - MPS per [facility] with normal perfusion and ejection fraction preserved.  d. 8/28/17 [facility] AA: 80% stenosis of the mid to distal RCA treated with 2 overlapping MARIAA, EF 60%  2. Dyslipidemia:  a. 11/18 114/98/41/53  3. Shortness of breath:  a. PFTs within normal limits, 2007.   4. Hypertension, presumed essential.  5. 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  6. Arthritis not otherwise specified.  7. Prostatism, recent implementation of alpha blockade.  8. Gastroesophageal reflux disease.  9. Mildly elevated transaminases.  10. Surgical history:  a. Left rotator cuff repair 1990.  b. Herniorrhaphy 1988.   c. Arthroscopic knee surgery.  d. Right rotator cuff repair, June 2015.    Allergies  Allergies   Allergen Reactions   • Other      Dual antiplatelet therapy, spontaneous bruising as of June 2010       Current Medications    Current Outpatient Medications:   •  aspirin 325 MG tablet, Take 81 mg by mouth Daily., Disp: , Rfl:   •  atorvastatin (LIPITOR) 10 MG tablet, TAKE 1 TABLET BY MOUTH EVERY DAY AT NIGHT, Disp: 90 tablet, Rfl: 3  •  carvedilol (COREG) 6.25 MG tablet, TAKE 1 TABLET BY MOUTH TWICE A DAY, Disp: 180 tablet, Rfl: 1  •  clopidogrel (PLAVIX) 75 MG tablet,  "Take 1 tablet by mouth Daily., Disp: 90 tablet, Rfl: 3  •  finasteride (PROSCAR) 5 MG tablet, Take 5 mg by mouth daily., Disp: , Rfl:   •  ketotifen (ZADITOR) 0.025 % ophthalmic solution, 1 drop as needed., Disp: , Rfl:   •  Loratadine 10 MG capsule, Take 10 mg by mouth As Needed., Disp: , Rfl:   •  Multiple Vitamins-Minerals (MULTIVITAMIN ADULT PO), Take 1 tablet by mouth Daily., Disp: , Rfl:   •  nitroglycerin (NITROSTAT) 0.4 MG SL tablet, DISSOLVE 1 TABLET UNDER TONGUE ONCE EVERY 5 MINS MAX OF 3 THEN CALL 911 IF NO RELIEF, Disp: , Rfl: 5  •  ramipril (ALTACE) 10 MG capsule, TAKE ONE CAPSULE BY MOUTH EVERY DAY (Patient taking differently: TAKE ONE CAPSULE BY MOUTH TWICE DAILY), Disp: 90 capsule, Rfl: 3  •  tamsulosin (FLOMAX) 0.4 MG capsule 24 hr capsule, Take 1 capsule by mouth every night., Disp: , Rfl:     History of Present Illness     Patient presents in video visit follow up.  States he is fared well during the coronavirus pandemic.  He is largely been at home not exercising regularly and his arthritides are worsening.  He has had no provocative chest symptoms    OBJECTIVE:  Vitals:    05/12/20 1013   Weight: 106 kg (233 lb)   Height: 177.8 cm (70\")       Diagnostic Data:  Procedures      ASSESSMENT:   Diagnosis Plan   1. Coronary artery disease involving native coronary artery of native heart without angina pectoris     2. Essential hypertension     3. Mixed hyperlipidemia         PLAN:  1. CAD post pci, nl lvef. Cont Dapt  unless needs off for elective procedure.  Decrease aspirin to 81  2.HTN  Well controlled today and at home per report. Continue current regimen of ramipril carvedilol tamsulosin  3. Reviewed most recent lipid panel from Dr. Dickey for continuity. Continue statin therapy.    This patient has consented to a telehealth visit via [employer]. The visit was scheduled as a video visit to comply with patient safety concerns in accordance with CDC recommendations.  All vitals recorded within this " visit are reported by the patient.  I spent  25 minutes in total including but not limited to the 21 minutes spent in direct conversation with this patient.       Markus Zhu MD , I independently performed the history and physical examination and developed the assessment and plan for this visit.    Markus Dickey MD, thank you for referring Mr. Newell for evaluation.  I have forwarded my electronically generated recommendations to you for review.  Please do not hesitate to call with any questions.     Markus Zhu MD, FACC

## 2020-06-15 ENCOUNTER — TREATMENT (OUTPATIENT)
Dept: PHYSICAL THERAPY | Facility: CLINIC | Age: 80
End: 2020-06-15

## 2020-06-15 ENCOUNTER — TRANSCRIBE ORDERS (OUTPATIENT)
Dept: PHYSICAL THERAPY | Facility: CLINIC | Age: 80
End: 2020-06-15

## 2020-06-15 DIAGNOSIS — M24.549 CONTRACTURE OF JOINT OF FINGER, UNSPECIFIED LATERALITY: Primary | ICD-10-CM

## 2020-06-15 DIAGNOSIS — M24.541 CONTRACTURE OF JOINT OF FINGER, RIGHT: Primary | ICD-10-CM

## 2020-06-15 DIAGNOSIS — M24.541 CONTRACTURE OF JOINT OF FINGER OF RIGHT HAND: Primary | ICD-10-CM

## 2020-06-15 PROCEDURE — L3927 FO PIP DIP NO JT SPR PRE OTS: HCPCS | Performed by: PHYSICAL THERAPIST

## 2020-06-16 NOTE — PROGRESS NOTES
Shady Newell 1940   Diagnosis/ Surgery: Right Small finger PIP flexion contracture              Date Of Onset: Insidious onset    Date Of Surgery:N/A    Hand Dominance: Right  History of Present Condition:  Past several months the small finger PIP joint started to develop a flexion contracture, slowly worsening with time, but has worsened the most significant amount in the past month.  Medical/Vocational History/ Medications:  Retired. 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 PMH see MD notes    Pain: Minimal discomfort if stretched out    Edema: N/A  Sensibility: WNL   Wound Status:N/A  ROM/ Strength/Test: 35 degree flexion contracture right small finger PIP joint    Splinting:  · Patient was measure and fit with a Pre-fabricated dynamic digital PIP extension tub splint. (pt did not want a joint carissa, did not feel he would be compliant with it.   · Patient was instructed in wearing schedule, precautions and care of the splint during this visit.   · Patient was instructed in proper donning/doffing of splint.   Assessment:  · Patient was fitted and appropriate splint was fabricated this date.  · Patient reported that splint was comfortable and had no complications with the fit of the splint.  · Patient was instructed and patient verbalized understanding of precautions, wear and care of the splint.   · Patient demonstrated independent donning/doffing of splint during treatment today.  Goals:  · Patient was fitted properly with appropriate splint for diagnosis  · Patient was educated on precautions, wear schedule and care of splint  · Patient demonstrated independence with donning/doffing of the splint.  · Splint was provided to increase ROM and  Improve joint alignment.  Plan:  · No additional treatment is required for this patient at this time. The patient is therefore discharged from therapy.  · Patient advised to contact therapist with any additional questions or concerns regarding the fit and function of the  splint.  · Patient will be seen for splint issues as needed   · Wear Instructions: wear at night.      PT SIGNATURE: Bennie Trinh PT, [facility]  DATE TREATMENT INITIATED: 6/15/2020

## 2020-08-05 RX ORDER — CLOPIDOGREL BISULFATE 75 MG/1
TABLET ORAL
Qty: 90 TABLET | Refills: 3 | Status: SHIPPED | OUTPATIENT
Start: 2020-08-05 | End: 2021-03-04 | Stop reason: SDUPTHER

## 2020-08-06 RX ORDER — CARVEDILOL 6.25 MG/1
TABLET ORAL
Qty: 180 TABLET | Refills: 2 | Status: SHIPPED | OUTPATIENT
Start: 2020-08-06 | End: 2021-02-10 | Stop reason: SDUPTHER

## 2020-12-16 ENCOUNTER — TELEPHONE (OUTPATIENT)
Dept: CARDIOLOGY | Facility: CLINIC | Age: 80
End: 2020-12-16

## 2020-12-16 NOTE — TELEPHONE ENCOUNTER
----- Message from Shady Newell sent at 12/16/2020  2:06 PM EST -----  Regarding: Referral Request  Contact: [phone]  Dr. Zhu, I'm sure you know that your close friend, Doc Dickey is leaving his practice in January.  He's moving to [facility].    My wife and I want our medical care within the [facility].  I'm writing to request help in getting into a practice to replace Doc with a doctor whom you respect.  Can you refer me?    Thank you for your help.    Tra Newell

## 2020-12-17 DIAGNOSIS — Z00.00 ENCOUNTER FOR GENERAL HEALTH EXAMINATION: Primary | ICD-10-CM

## 2021-01-11 RX ORDER — ATORVASTATIN CALCIUM 10 MG/1
TABLET, FILM COATED ORAL
Qty: 90 TABLET | Refills: 3 | Status: SHIPPED | OUTPATIENT
Start: 2021-01-11 | End: 2021-03-04 | Stop reason: SDUPTHER

## 2021-02-10 RX ORDER — CARVEDILOL 6.25 MG/1
6.25 TABLET ORAL 2 TIMES DAILY
Qty: 30 TABLET | Refills: 0 | Status: SHIPPED | OUTPATIENT
Start: 2021-02-10 | End: 2021-03-04 | Stop reason: SDUPTHER

## 2021-03-04 ENCOUNTER — OFFICE VISIT (OUTPATIENT)
Dept: INTERNAL MEDICINE | Facility: CLINIC | Age: 81
End: 2021-03-04

## 2021-03-04 ENCOUNTER — LAB (OUTPATIENT)
Dept: LAB | Facility: HOSPITAL | Age: 81
End: 2021-03-04

## 2021-03-04 VITALS
HEIGHT: 70 IN | WEIGHT: 240 LBS | TEMPERATURE: 97.5 F | SYSTOLIC BLOOD PRESSURE: 144 MMHG | BODY MASS INDEX: 34.36 KG/M2 | OXYGEN SATURATION: 95 % | DIASTOLIC BLOOD PRESSURE: 90 MMHG | HEART RATE: 68 BPM

## 2021-03-04 DIAGNOSIS — R73.03 PREDIABETES: ICD-10-CM

## 2021-03-04 DIAGNOSIS — J30.2 SEASONAL ALLERGIES: ICD-10-CM

## 2021-03-04 DIAGNOSIS — E78.5 DYSLIPIDEMIA: ICD-10-CM

## 2021-03-04 DIAGNOSIS — R01.1 SYSTOLIC MURMUR: ICD-10-CM

## 2021-03-04 DIAGNOSIS — E66.9 OBESITY, CLASS I, BMI 30.0-34.9 (SEE ACTUAL BMI): ICD-10-CM

## 2021-03-04 DIAGNOSIS — K21.9 GASTROESOPHAGEAL REFLUX DISEASE WITHOUT ESOPHAGITIS: ICD-10-CM

## 2021-03-04 DIAGNOSIS — N40.1 BENIGN PROSTATIC HYPERPLASIA WITH WEAK URINARY STREAM: ICD-10-CM

## 2021-03-04 DIAGNOSIS — R74.01 ELEVATED TRANSAMINASE LEVEL: ICD-10-CM

## 2021-03-04 DIAGNOSIS — Z95.5 HISTORY OF CORONARY ANGIOPLASTY WITH INSERTION OF STENT: ICD-10-CM

## 2021-03-04 DIAGNOSIS — M15.9 PRIMARY OSTEOARTHRITIS INVOLVING MULTIPLE JOINTS: ICD-10-CM

## 2021-03-04 DIAGNOSIS — I10 ESSENTIAL HYPERTENSION: ICD-10-CM

## 2021-03-04 DIAGNOSIS — I10 ESSENTIAL HYPERTENSION: Primary | ICD-10-CM

## 2021-03-04 DIAGNOSIS — R39.12 BENIGN PROSTATIC HYPERPLASIA WITH WEAK URINARY STREAM: ICD-10-CM

## 2021-03-04 LAB
ALBUMIN SERPL-MCNC: 4.6 G/DL (ref 3.5–5.2)
ALBUMIN/GLOB SERPL: 1.5 G/DL
ALP SERPL-CCNC: 91 U/L (ref 39–117)
ALT SERPL W P-5'-P-CCNC: 61 U/L (ref 1–41)
ANION GAP SERPL CALCULATED.3IONS-SCNC: 11.5 MMOL/L (ref 5–15)
AST SERPL-CCNC: 53 U/L (ref 1–40)
BILIRUB SERPL-MCNC: 0.7 MG/DL (ref 0–1.2)
BUN SERPL-MCNC: 13 MG/DL (ref 8–23)
BUN/CREAT SERPL: 18.3 (ref 7–25)
CALCIUM SPEC-SCNC: 10.1 MG/DL (ref 8.6–10.5)
CHLORIDE SERPL-SCNC: 103 MMOL/L (ref 98–107)
CHOLEST SERPL-MCNC: 115 MG/DL (ref 0–200)
CO2 SERPL-SCNC: 26.5 MMOL/L (ref 22–29)
CREAT SERPL-MCNC: 0.71 MG/DL (ref 0.76–1.27)
GFR SERPL CREATININE-BSD FRML MDRD: 107 ML/MIN/1.73
GLOBULIN UR ELPH-MCNC: 3 GM/DL
GLUCOSE SERPL-MCNC: 143 MG/DL (ref 65–99)
HBA1C MFR BLD: 7.34 % (ref 4.8–5.6)
HDLC SERPL-MCNC: 41 MG/DL (ref 40–60)
LDLC SERPL CALC-MCNC: 58 MG/DL (ref 0–100)
LDLC/HDLC SERPL: 1.4 {RATIO}
POTASSIUM SERPL-SCNC: 4.6 MMOL/L (ref 3.5–5.2)
PROT SERPL-MCNC: 7.6 G/DL (ref 6–8.5)
SODIUM SERPL-SCNC: 141 MMOL/L (ref 136–145)
TRIGL SERPL-MCNC: 82 MG/DL (ref 0–150)
VLDLC SERPL-MCNC: 16 MG/DL (ref 5–40)

## 2021-03-04 PROCEDURE — 99205 OFFICE O/P NEW HI 60 MIN: CPT | Performed by: STUDENT IN AN ORGANIZED HEALTH CARE EDUCATION/TRAINING PROGRAM

## 2021-03-04 PROCEDURE — 83036 HEMOGLOBIN GLYCOSYLATED A1C: CPT

## 2021-03-04 PROCEDURE — 80061 LIPID PANEL: CPT

## 2021-03-04 PROCEDURE — 80053 COMPREHEN METABOLIC PANEL: CPT

## 2021-03-04 RX ORDER — CLOPIDOGREL BISULFATE 75 MG/1
75 TABLET ORAL DAILY
Qty: 90 TABLET | Refills: 3 | Status: SHIPPED | OUTPATIENT
Start: 2021-03-04 | End: 2022-04-11

## 2021-03-04 RX ORDER — FAMOTIDINE 20 MG/1
20 TABLET, FILM COATED ORAL
COMMUNITY
End: 2021-03-04 | Stop reason: SDUPTHER

## 2021-03-04 RX ORDER — KETOTIFEN FUMARATE 0.35 MG/ML
1 SOLUTION/ DROPS OPHTHALMIC 2 TIMES DAILY PRN
Qty: 10 ML | Refills: 3 | Status: SHIPPED | OUTPATIENT
Start: 2021-03-04 | End: 2021-05-14

## 2021-03-04 RX ORDER — RAMIPRIL 10 MG/1
10 CAPSULE ORAL 2 TIMES DAILY
Qty: 180 CAPSULE | Refills: 3 | Status: SHIPPED | OUTPATIENT
Start: 2021-03-04 | End: 2021-05-14

## 2021-03-04 RX ORDER — HYDROCHLOROTHIAZIDE 12.5 MG/1
12.5 TABLET ORAL DAILY
Qty: 90 TABLET | Refills: 3 | Status: SHIPPED | OUTPATIENT
Start: 2021-03-04 | End: 2022-02-21

## 2021-03-04 RX ORDER — FAMOTIDINE 20 MG/1
20 TABLET, FILM COATED ORAL
Qty: 90 TABLET | Refills: 3 | Status: SHIPPED | OUTPATIENT
Start: 2021-03-04 | End: 2022-03-23

## 2021-03-04 RX ORDER — LORATADINE 10 MG/1
10 CAPSULE, LIQUID FILLED ORAL AS NEEDED
Qty: 90 CAPSULE | Refills: 3 | Status: SHIPPED | OUTPATIENT
Start: 2021-03-04 | End: 2022-04-11 | Stop reason: SDUPTHER

## 2021-03-04 RX ORDER — ASPIRIN 81 MG/1
81 TABLET ORAL DAILY
Qty: 1 TABLET | Refills: 0 | COMMUNITY
Start: 2021-03-04

## 2021-03-04 RX ORDER — ATORVASTATIN CALCIUM 10 MG/1
10 TABLET, FILM COATED ORAL
Qty: 90 TABLET | Refills: 3 | Status: SHIPPED | OUTPATIENT
Start: 2021-03-04 | End: 2022-03-11

## 2021-03-04 RX ORDER — CARVEDILOL 6.25 MG/1
6.25 TABLET ORAL 2 TIMES DAILY
Qty: 180 TABLET | Refills: 3 | Status: SHIPPED | OUTPATIENT
Start: 2021-03-04 | End: 2022-04-11

## 2021-03-04 NOTE — PROGRESS NOTES
"History of Present Illness  Shady Newell is a 80 y.o. male presenting for Hypertension (establish care).   . Wife is health care proxy. Have 2 sons (born -66 and -73) - [place] in [facility] [place], the other in [place], [place]. Worked in bank in the past, later on building management.    Patient is here to establish care.  He has a past medical history of coronary heart disease without MI and was stented in 2006 and 2016, CHF, GERD, dyslipidemia, osteoarthritis of multiple joints, osteoarthritis of multiple joints, hypertension, BPH, obesity and elevated transaminase levels.    Past surgical history of vasectomy, bilateral rotator cuff repair, hernia repair, cataract surgery, knee arthroscopy.    Patient is here to establish care after his most recent PCP left his practice.  Overall he is doing well, no specific complaints to discuss.      The following portions of the patient's history were reviewed and updated as appropriate: allergies, current medications, past family history, past medical history, past social history, past surgical history and problem list.    Review of Systems   Constitutional: Negative for fever.   HENT: Negative for congestion.    Eyes: Negative for visual disturbance.   Respiratory: Negative for shortness of breath.    Cardiovascular: Negative for chest pain.   Gastrointestinal: Negative for abdominal pain.   Genitourinary: Negative for dysuria.   Skin: Negative for rash.   Neurological: Negative for weakness.   Psychiatric/Behavioral: Negative for depressed mood.       Objective  /90 (BP Location: Left arm, Patient Position: Sitting, Cuff Size: Adult)   Pulse 68   Temp 97.5 °F (36.4 °C) (Temporal)   Ht 177.8 cm (70\")   Wt 109 kg (240 lb)   SpO2 95%   BMI 34.44 kg/m²     Physical Exam  Vitals signs reviewed.   Constitutional:       Appearance: Normal appearance. He is obese.   HENT:      Head: Normocephalic and atraumatic.      Nose: Nose normal. No congestion.      " Mouth/Throat:      Mouth: Mucous membranes are moist.   Eyes:      Extraocular Movements: Extraocular movements intact.      Conjunctiva/sclera: Conjunctivae normal.   Neck:      Musculoskeletal: Neck supple.   Cardiovascular:      Rate and Rhythm: Normal rate and regular rhythm.      Heart sounds: Murmur present.      Comments: Systolic murmur grade 2/6 right second intercostal space, does not radiate to the neck.  Pulmonary:      Effort: Pulmonary effort is normal.      Breath sounds: Normal breath sounds.   Abdominal:      General: There is no distension.      Palpations: Abdomen is soft. There is no mass.      Tenderness: There is no abdominal tenderness.   Musculoskeletal:      Right lower leg: No edema.      Left lower leg: No edema.   Skin:     General: Skin is warm and dry.   Neurological:      Mental Status: He is alert and oriented to person, place, and time. Mental status is at baseline.   Psychiatric:         Mood and Affect: Mood normal.         Behavior: Behavior normal.         Thought Content: Thought content normal.         Judgment: Judgment normal.         Assessment/Plan   1. Essential hypertension  BP Readings from Last 3 Encounters:   03/04/21 144/90   06/07/19 120/68   06/01/18 140/68   Blood pressure not at goal.  He is on max dose of ramipril 10 mg twice daily, which is the highest recommended dose for hypertension.  His creatinine has been normal in the past.  We will add hydrochlorothiazide 12.5 mg.  No history of gout.  Patient will check blood pressure at home and send a [address] message with the results within a couple of weeks.  If still not at goal below 140/90 we will consider additional medication.  - ramipril (ALTACE) 10 MG capsule; Take 1 capsule by mouth 2 (Two) Times a Day.  Dispense: 180 capsule; Refill: 3  - hydroCHLOROthiazide (HYDRODIURIL) 12.5 MG tablet; Take 1 tablet by mouth Daily.  Dispense: 90 tablet; Refill: 3  - carvedilol (COREG) 6.25 MG tablet; Take 1 tablet by mouth  2 (Two) Times a Day.  Dispense: 180 tablet; Refill: 3  - Comprehensive Metabolic Panel; Future    2. History of coronary angioplasty with insertion of stent  Has nitroglycerin, never uses.  No chest pain.  Overall feels well.  Has follow-up with cardiology.  - aspirin (aspirin) 81 MG EC tablet; Take 1 tablet by mouth Daily.  Dispense: 1 tablet; Refill: 0  - clopidogrel (PLAVIX) 75 MG tablet; Take 1 tablet by mouth Daily.  Dispense: 90 tablet; Refill: 3    3. Systolic murmur  Patient not aware of a history of systolic murmur.  Only grade 2/6.  Patient has follow-up with cardiology in a couple of months and will have them look at it.  No concerning symptoms.    4. Dyslipidemia  Continue on atorvastatin.  We will do fasting labs today.  - atorvastatin (LIPITOR) 10 MG tablet; Take 1 tablet by mouth every night at bedtime.  Dispense: 90 tablet; Refill: 3  - Lipid Panel; Future    5. Obesity, Class I, BMI 30.0-34.9 (see actual BMI)  Counseled on weight loss, current BMI is 34.  Reviewed exercise and diet.    6. Prediabetes  His A1c was 6.4 a few years ago, will check it again today.  If he qualifies for diagnosis of diabetes I will have him come back sooner for follow-up.  - Hemoglobin A1c; Future    7. Gastroesophageal reflux disease without esophagitis  Stable.  Never had EGD.  Continue on famotidine.  - famotidine (PEPCID) 20 MG tablet; Take 1 tablet by mouth every night at bedtime.  Dispense: 90 tablet; Refill: 3    8. Elevated transaminase level  We will recheck levels today.    9. Benign prostatic hyperplasia with weak urinary stream  Has follow-up with urology.  Continue on finasteride and tamsulosin.    10. Primary osteoarthritis involving multiple joints  Has follow-up with Ortho.    11. Seasonal allergies  Symptoms during spring and elidia.  - Loratadine 10 MG capsule; Take 1 capsule by mouth As Needed (allergy).  Dispense: 90 capsule; Refill: 3  - ketotifen (ZADITOR) 0.025 % ophthalmic solution; Administer 1  drop to both eyes 2 (Two) Times a Day As Needed (allergy).  Dispense: 10 mL; Refill: 3    Total time spent free charting, charting and face-to-face with patient 73 minutes    Return in about 6 months (around 9/4/2021) for Medicare Wellness.    Future Appointments       Provider Department Center    5/14/2021 10:15 AM Markus Zhu MD [facility] CARDIOLOGY     7/22/2021 9:00 AM Stu Dickinson MD [facility] INTERNAL MEDICINE & PEDIATRICS [facility]    9/9/2021 8:30 AM Marko Lock MD [facility] INTERNAL MEDICINE [facility]            Marko Lock MD  Family Medicine  03/04/2021    Note: Speech recognition transcription software may have been used to create portions of this document.  An attempt at proofreading has been made but errors in transcription could still be present.

## 2021-03-05 ENCOUNTER — TELEPHONE (OUTPATIENT)
Dept: INTERNAL MEDICINE | Facility: CLINIC | Age: 81
End: 2021-03-05

## 2021-03-05 ENCOUNTER — PATIENT MESSAGE (OUTPATIENT)
Dept: INTERNAL MEDICINE | Facility: CLINIC | Age: 81
End: 2021-03-05

## 2021-03-05 DIAGNOSIS — E11.9 CONTROLLED TYPE 2 DIABETES MELLITUS WITHOUT COMPLICATION, WITHOUT LONG-TERM CURRENT USE OF INSULIN (HCC): Primary | Chronic | ICD-10-CM

## 2021-03-05 NOTE — TELEPHONE ENCOUNTER
I had called to schedule patient in 6-8 weeks for recheck per Dr. Lock. He was currently driving if someone could call him later today or next week to reschedule. Please and thank you.

## 2021-03-05 NOTE — TELEPHONE ENCOUNTER
From: Shady Newell  To: Marko Lock MD  Sent: 3/5/2021 12:04 PM EST  Subject: Prescription Question    Dr. Lock, I accept your recommendation to commence taking Metformin. Please prescribe it for me and send to [facility] so I can start. Thank you so much for taking a look and quickly acting on the results.    Tra Newell

## 2021-03-09 NOTE — TELEPHONE ENCOUNTER
Would we be able to request records from ophthalmology based on information and patient's message?  Thanks

## 2021-04-15 ENCOUNTER — OFFICE VISIT (OUTPATIENT)
Dept: INTERNAL MEDICINE | Facility: CLINIC | Age: 81
End: 2021-04-15

## 2021-04-15 VITALS
OXYGEN SATURATION: 97 % | DIASTOLIC BLOOD PRESSURE: 84 MMHG | HEART RATE: 68 BPM | HEIGHT: 70 IN | TEMPERATURE: 97.3 F | SYSTOLIC BLOOD PRESSURE: 138 MMHG | WEIGHT: 226.8 LBS | BODY MASS INDEX: 32.47 KG/M2

## 2021-04-15 DIAGNOSIS — E11.9 CONTROLLED TYPE 2 DIABETES MELLITUS WITHOUT COMPLICATION, WITHOUT LONG-TERM CURRENT USE OF INSULIN (HCC): Primary | ICD-10-CM

## 2021-04-15 DIAGNOSIS — E66.9 OBESITY, CLASS I, BMI 30.0-34.9 (SEE ACTUAL BMI): ICD-10-CM

## 2021-04-15 DIAGNOSIS — I10 ESSENTIAL HYPERTENSION: ICD-10-CM

## 2021-04-15 DIAGNOSIS — R01.1 SYSTOLIC MURMUR: ICD-10-CM

## 2021-04-15 LAB — HBA1C MFR BLD: 6.9 %

## 2021-04-15 PROCEDURE — 83036 HEMOGLOBIN GLYCOSYLATED A1C: CPT | Performed by: STUDENT IN AN ORGANIZED HEALTH CARE EDUCATION/TRAINING PROGRAM

## 2021-04-15 PROCEDURE — 99215 OFFICE O/P EST HI 40 MIN: CPT | Performed by: STUDENT IN AN ORGANIZED HEALTH CARE EDUCATION/TRAINING PROGRAM

## 2021-04-15 RX ORDER — METFORMIN HYDROCHLORIDE 500 MG/1
500 TABLET, EXTENDED RELEASE ORAL 2 TIMES DAILY
Qty: 180 TABLET | Refills: 1 | Status: SHIPPED | OUTPATIENT
Start: 2021-04-15 | End: 2021-09-17 | Stop reason: SDUPTHER

## 2021-04-15 NOTE — PROGRESS NOTES
"History of Present Illness  Shady Newell is a 80 y.o. male presenting for Hypertension (f/u) and Diabetes.    Patient has a past medical history of coronary heart disease without MI and was stented in 2006 and 2016, CHF, T2DM (3/2021), GERD, dyslipidemia, osteoarthritis of multiple joints, osteoarthritis of multiple joints, hypertension, BPH, obesity and elevated transaminase levels.    Patient has a component of whitecoat hypertension, on his last visit we did add hydrochlorothiazide to his regimen as his blood pressure was not well controlled.  He has tolerated medication well.  His home blood pressures range from 114-135 /61-83.    He also had a history of prediabetes and on his last visit was diagnosed with type 2 diabetes with A1c 7.34.  He was started on Metformin 500 mg twice daily and has tolerated well, but he states some loose bowels with urgency about once daily.  He has upcoming ophthalmology exam with his  affiliate Dr. Roland in May.    Patient has made some changes to his diet and has went down from 240pounds to 226 pounds today.  He also exercises 3 days a week.    The following portions of the patient's history were reviewed and updated as appropriate: allergies, current medications, past family history, past medical history, past social history, past surgical history and problem list.    Review of Systems    Objective  /84 (BP Location: Left arm, Patient Position: Sitting, Cuff Size: Large Adult)   Pulse 68   Temp 97.3 °F (36.3 °C) (Temporal)   Ht 177.8 cm (70\")   Wt 103 kg (226 lb 12.8 oz)   SpO2 97%   BMI 32.54 kg/m²     Physical Exam  Vitals reviewed.   Constitutional:       Appearance: Normal appearance.   HENT:      Head: Normocephalic and atraumatic.      Nose: Nose normal. No congestion.      Mouth/Throat:      Mouth: Mucous membranes are moist.   Eyes:      Extraocular Movements: Extraocular movements intact.      Conjunctiva/sclera: Conjunctivae normal.   Cardiovascular:    "   Rate and Rhythm: Normal rate and regular rhythm.      Pulses:           Dorsalis pedis pulses are 1+ on the right side and 1+ on the left side.        Posterior tibial pulses are 3+ on the right side and 3+ on the left side.      Heart sounds: Murmur heard.     Pulmonary:      Effort: Pulmonary effort is normal.      Breath sounds: Normal breath sounds.   Musculoskeletal:      Cervical back: Neck supple.      Right lower leg: No edema.      Left lower leg: No edema.      Right foot: Normal range of motion.      Left foot: Normal range of motion.   Feet:      Right foot:      Protective Sensation: 10 sites tested. 10 sites sensed.      Skin integrity: Dry skin present. No ulcer or skin breakdown.      Toenail Condition: Right toenails are normal.      Left foot:      Protective Sensation: 10 sites tested. 10 sites sensed.      Skin integrity: Dry skin present. No ulcer or skin breakdown.      Toenail Condition: Left toenails are normal.      Comments: Diabetic Foot Exam Performed and Monofilament Test Performed  Mild dryness of the feet, otherwise normal foot exam.  No concern for neuropathy.  Skin:     General: Skin is warm and dry.   Neurological:      Mental Status: He is alert and oriented to person, place, and time. Mental status is at baseline.   Psychiatric:         Behavior: Behavior normal.         Thought Content: Thought content normal.         Assessment/Plan   1. Controlled type 2 diabetes mellitus without complication, without long-term current use of insulin (CMS/[facility])  Hemoglobin A1C   Date Value Ref Range Status   04/15/2021 6.9 % Final   03/04/2021 7.34 (H) 4.80 - 5.60 % Final   08/28/2017 6.40 (H) 4.80 - 5.60 % Final   Diabetes well controlled, no known complications.  We have requested records from Dr. Roland for his most recent eye exam and I have asked patient to let Dr. Roladn know that he has a new diagnosis of diabetes and to ask if we can get records.  His mild loose bowels is likely GI upset  from Metformin, will transition to extended release.  Patient in agreement.  Reviewed diabetes follow-up, added reference to after visit summary.  - POC Glycosylated Hemoglobin (Hb A1C)  - metFORMIN ER (GLUCOPHAGE-XR) 500 MG 24 hr tablet; Take 1 tablet by mouth 2 (two) times a day.  Dispense: 180 tablet; Refill: 1  - MicroAlbumin, Urine, Random - Urine, Clean Catch; Future    2. Essential hypertension  BP Readings from Last 3 Encounters:   04/15/21 138/84   03/04/21 144/90   06/07/19 120/68   Currently well controlled.  We will continue on current regimen.    3. Obesity, Class I, BMI 30.0-34.9 (see actual BMI)  Patient's Body mass index is 32.54 kg/m². BMI is above normal parameters. Recommendations include: educational material, exercise counseling and nutrition counseling.    4. Systolic murmur  Stable.  Patient has follow-up with cardiology.    Total time spent on chart review, charting and face-to-face with patient 49 minutes.    Return for Next scheduled follow up.    Marok Lock MD  Family Medicine  04/15/2021    Note: Speech recognition transcription software may have been used to create portions of this document.  An attempt at proofreading has been made but errors in transcription could still be present.  Answers for HPI/ROS submitted by the patient on 4/10/2021  What is the primary reason for your visit?: Diabetes  Diabetes type: type 2  MedicAlert ID: No  Disease duration: 2 months  foot paresthesias: No  foot ulcerations: No  visual change: No  Symptom course: stable  headaches: No  hunger: No  mood changes: No  sleepiness: No  sweats: No  blackouts: No  hospitalization: No  nocturnal hypoglycemia: No  required assistance: No  required glucagon: No  CVA: No  heart disease: No  impotence: No  nephropathy: No  peripheral neuropathy: No  PVD: No  retinopathy: No  CAD risks: family history, hypertension, obesity  Current treatments: oral agent (monotherapy)  Treatment compliance: all of the time  Dose  schedule: pre-breakfast, pre-dinner  Monitoring compliance: no compliance  Blood glucose trend: no change  Weight trend: decreasing steadily  Current diet: generally healthy  Meal planning: avoidance of concentrated sweets  Exercise: three times a week  Dietitian visit: No  Eye exam current: Yes  Sees podiatrist: No

## 2021-04-20 ENCOUNTER — PATIENT MESSAGE (OUTPATIENT)
Dept: INTERNAL MEDICINE | Facility: CLINIC | Age: 81
End: 2021-04-20

## 2021-04-20 NOTE — TELEPHONE ENCOUNTER
From: Shady Newell  To: Marko Lock MD  Sent: 4/20/2021 11:24 AM EDT  Subject: Prescription Question    During our appointment last week, I understood you were prescribing metFormin ER to replace Metformin 500. The [facility] in [place] that I use has not received the new prescription. Please check on it. Thank you.

## 2021-05-14 ENCOUNTER — OFFICE VISIT (OUTPATIENT)
Dept: CARDIOLOGY | Facility: CLINIC | Age: 81
End: 2021-05-14

## 2021-05-14 VITALS
OXYGEN SATURATION: 97 % | SYSTOLIC BLOOD PRESSURE: 148 MMHG | WEIGHT: 227 LBS | HEIGHT: 70 IN | DIASTOLIC BLOOD PRESSURE: 66 MMHG | BODY MASS INDEX: 32.5 KG/M2 | HEART RATE: 77 BPM

## 2021-05-14 DIAGNOSIS — I20.9 ANGINA PECTORIS (HCC): Primary | ICD-10-CM

## 2021-05-14 DIAGNOSIS — I25.10 CORONARY ARTERY DISEASE INVOLVING NATIVE CORONARY ARTERY OF NATIVE HEART WITHOUT ANGINA PECTORIS: ICD-10-CM

## 2021-05-14 DIAGNOSIS — I10 ESSENTIAL HYPERTENSION: ICD-10-CM

## 2021-05-14 DIAGNOSIS — E78.2 MIXED HYPERLIPIDEMIA: ICD-10-CM

## 2021-05-14 PROCEDURE — 99214 OFFICE O/P EST MOD 30 MIN: CPT | Performed by: INTERNAL MEDICINE

## 2021-05-14 NOTE — PROGRESS NOTES
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    Chief Complaint   Patient presents with   • Coronary artery disease involving native coronary artery of        PROBLEM LIST:   1. Coronary artery disease:  a. January 2005: Stress Cardiolite [place] with fixed inferior defect, diaphragmatic attenuation could not be ruled out, 11 minutes 43 seconds via Thom protocol. EF 63%.   b. August 2006: Left heart catheterization with PCI and stenting, 99% LAD stenosis, 3.0x28 mm CYPHER drug-eluting stent post-dilated to 3.5 mm by Bebeto Zhu and Shi, nonobstructive disease otherwise, mild flow limitation of jailed diagonal.  c. February 2012 - MPS per [facility] with normal perfusion and ejection fraction preserved.  d. 8/28/17 [facility] AA: 80% stenosis of the mid to distal RCA treated with 2 overlapping MARIAA, EF 60%  2. Dyslipidemia:  a. 11/18 114/98/41/53  3. Shortness of breath:  a. PFTs within normal limits, 2007.   4. Hypertension, presumed essential.  5. 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  6. Arthritis not otherwise specified.  7. Prostatism, recent implementation of alpha blockade.  8. Gastroesophageal reflux disease.  9. Mildly elevated transaminases.  10. Surgical history:  a. Left rotator cuff repair 1990.  b. Herniorrhaphy 1988.   c. Arthroscopic knee surgery.  d. Right rotator cuff repair, June 2015.    Allergies  Allergies   Allergen Reactions   • Other      Dual antiplatelet therapy, spontaneous bruising as of June 2010       Current Medications    Current Outpatient Medications:   •  aspirin (aspirin) 81 MG EC tablet, Take 1 tablet by mouth Daily., Disp: 1 tablet, Rfl: 0  •  atorvastatin (LIPITOR) 10 MG tablet, Take 1 tablet by mouth every night at bedtime., Disp: 90 tablet, Rfl: 3  •  carvedilol (COREG) 6.25 MG tablet, Take 1 tablet by mouth 2 (Two)  "Times a Day., Disp: 180 tablet, Rfl: 3  •  clopidogrel (PLAVIX) 75 MG tablet, Take 1 tablet by mouth Daily., Disp: 90 tablet, Rfl: 3  •  famotidine (PEPCID) 20 MG tablet, Take 1 tablet by mouth every night at bedtime., Disp: 90 tablet, Rfl: 3  •  finasteride (PROSCAR) 5 MG tablet, Take 5 mg by mouth daily., Disp: , Rfl:   •  hydroCHLOROthiazide (HYDRODIURIL) 12.5 MG tablet, Take 1 tablet by mouth Daily., Disp: 90 tablet, Rfl: 3  •  Loratadine 10 MG capsule, Take 1 capsule by mouth As Needed (allergy)., Disp: 90 capsule, Rfl: 3  •  metFORMIN ER (GLUCOPHAGE-XR) 500 MG 24 hr tablet, Take 1 tablet by mouth 2 (two) times a day., Disp: 180 tablet, Rfl: 1  •  Multiple Vitamins-Minerals (MULTIVITAMIN ADULT PO), Take 1 tablet by mouth Daily., Disp: , Rfl:   •  nitroglycerin (NITROSTAT) 0.4 MG SL tablet, DISSOLVE 1 TABLET UNDER TONGUE ONCE EVERY 5 MINS MAX OF 3 THEN CALL 911 IF NO RELIEF, Disp: , Rfl: 5  •  tamsulosin (FLOMAX) 0.4 MG capsule 24 hr capsule, Take 1 capsule by mouth every night., Disp: , Rfl:     History of Present Illness     Patient presents in follow up.  Had some recurrent chest pain equivalent has not taken nitroglycerin.  Interestingly states this does not occur when he does exercise at gym only with fits in spurts activities at home.      OBJECTIVE:  Vitals:    05/14/21 0957   BP: 148/66   BP Location: Right arm   Patient Position: Sitting   Pulse: 77   SpO2: 97%   Weight: 103 kg (227 lb)   Height: 177.8 cm (70\")     Physical Exam  Vitals and nursing note reviewed.   Constitutional:       General: He is not in acute distress.     Appearance: He is well-developed.   Neck:      Thyroid: No thyromegaly.      Vascular: No carotid bruit, hepatojugular reflux or JVD.      Trachea: No tracheal deviation.   Cardiovascular:      Rate and Rhythm: Normal rate and regular rhythm.      Chest Wall: PMI is not displaced.      Pulses: Normal pulses and intact distal pulses. No midsystolic click and no opening snap.       "     Radial pulses are 2+ on the right side and 2+ on the left side.        Dorsalis pedis pulses are 2+ on the right side and 2+ on the left side.        Posterior tibial pulses are 2+ on the right side and 2+ on the left side.      Heart sounds: S1 normal and S2 normal. Heart sounds not distant. No murmur heard.   No friction rub. No gallop.    Pulmonary:      Effort: Pulmonary effort is normal.      Breath sounds: Normal breath sounds. No wheezing or rales.   Abdominal:      General: Bowel sounds are normal.      Palpations: Abdomen is soft. There is no mass.      Tenderness: There is no abdominal tenderness. There is no guarding.   Musculoskeletal:         General: No tenderness. Normal range of motion.      Cervical back: Normal range of motion and neck supple.   Neurological:      Mental Status: He is alert and oriented to person, place, and time.   Psychiatric:         Behavior: Behavior normal.         Diagnostic Data:  Procedures      ASSESSMENT:   Diagnosis Plan   1. Angina pectoris (CMS/HCC)     2. Coronary artery disease involving native coronary artery of native heart without angina pectoris     3. Essential hypertension     4. Mixed hyperlipidemia         PLAN:  1. CAD post pci, nl lvef. Cont Dapt X 2 yrs unless needs off for elective procedure.  Decrease aspirin to 81.  Chelo PET scan at nearest convenience  2.HTN  Well controlled today and at home per report. Continue current regimen of ramipril carvedilol tamsulosin  3.Continue statin therapy.      Markus Zhu MD , I independently performed the history and physical examination and developed the assessment and plan for this visit.    Marko Lock MD, thank you for referring Mr. Newell for evaluation.  I have forwarded my electronically generated recommendations to you for review.  Please do not hesitate to call with any questions.     Markus Zhu MD, [facility]

## 2021-05-21 DIAGNOSIS — I25.119 CORONARY ARTERY DISEASE INVOLVING NATIVE HEART WITH ANGINA PECTORIS, UNSPECIFIED VESSEL OR LESION TYPE (HCC): ICD-10-CM

## 2021-05-21 DIAGNOSIS — E66.9 OBESITY, CLASS I, BMI 30.0-34.9 (SEE ACTUAL BMI): ICD-10-CM

## 2021-05-21 DIAGNOSIS — E11.9 CONTROLLED TYPE 2 DIABETES MELLITUS WITHOUT COMPLICATION, WITHOUT LONG-TERM CURRENT USE OF INSULIN (HCC): ICD-10-CM

## 2021-05-21 DIAGNOSIS — R74.01 ELEVATED TRANSAMINASE LEVEL: ICD-10-CM

## 2021-05-21 DIAGNOSIS — R01.1 SYSTOLIC MURMUR: ICD-10-CM

## 2021-05-21 DIAGNOSIS — I10 ESSENTIAL HYPERTENSION: Primary | ICD-10-CM

## 2021-05-21 DIAGNOSIS — E78.5 DYSLIPIDEMIA: ICD-10-CM

## 2021-06-02 ENCOUNTER — HOSPITAL ENCOUNTER (OUTPATIENT)
Dept: CARDIOLOGY | Facility: HOSPITAL | Age: 81
Discharge: HOME | End: 2021-06-02
Admitting: INTERNAL MEDICINE

## 2021-06-02 VITALS
DIASTOLIC BLOOD PRESSURE: 81 MMHG | SYSTOLIC BLOOD PRESSURE: 127 MMHG | BODY MASS INDEX: 32.5 KG/M2 | OXYGEN SATURATION: 97 % | WEIGHT: 227 LBS | HEIGHT: 70 IN | HEART RATE: 73 BPM

## 2021-06-02 DIAGNOSIS — E66.9 OBESITY, CLASS I, BMI 30.0-34.9 (SEE ACTUAL BMI): ICD-10-CM

## 2021-06-02 DIAGNOSIS — R74.01 ELEVATED TRANSAMINASE LEVEL: ICD-10-CM

## 2021-06-02 DIAGNOSIS — I10 ESSENTIAL HYPERTENSION: ICD-10-CM

## 2021-06-02 DIAGNOSIS — R01.1 SYSTOLIC MURMUR: ICD-10-CM

## 2021-06-02 DIAGNOSIS — I25.119 CORONARY ARTERY DISEASE INVOLVING NATIVE HEART WITH ANGINA PECTORIS, UNSPECIFIED VESSEL OR LESION TYPE (HCC): ICD-10-CM

## 2021-06-02 DIAGNOSIS — E78.5 DYSLIPIDEMIA: ICD-10-CM

## 2021-06-02 DIAGNOSIS — E11.9 CONTROLLED TYPE 2 DIABETES MELLITUS WITHOUT COMPLICATION, WITHOUT LONG-TERM CURRENT USE OF INSULIN (HCC): ICD-10-CM

## 2021-06-02 PROCEDURE — 25010000002 REGADENOSON 0.4 MG/5ML SOLUTION: Performed by: INTERNAL MEDICINE

## 2021-06-02 PROCEDURE — A9555 RB82 RUBIDIUM: HCPCS | Performed by: INTERNAL MEDICINE

## 2021-06-02 PROCEDURE — 93017 CV STRESS TEST TRACING ONLY: CPT

## 2021-06-02 PROCEDURE — 93018 CV STRESS TEST I&R ONLY: CPT | Performed by: INTERNAL MEDICINE

## 2021-06-02 PROCEDURE — 0 RUBIDIUM CHLORIDE: Performed by: INTERNAL MEDICINE

## 2021-06-02 PROCEDURE — 78431 MYOCRD IMG PET RST&STRS CT: CPT

## 2021-06-02 PROCEDURE — 78492 MYOCRD IMG PET MLT RST&STRS: CPT | Performed by: INTERNAL MEDICINE

## 2021-06-02 PROCEDURE — 78492 MYOCRD IMG PET MLT RST&STRS: CPT

## 2021-06-02 RX ADMIN — REGADENOSON 0.4 MG: 0.08 INJECTION, SOLUTION INTRAVENOUS at 12:48

## 2021-06-02 RX ADMIN — RUBIDIUM CHLORIDE RB-82 1 DOSE: 150 INJECTION, SOLUTION INTRAVENOUS at 12:39

## 2021-06-02 RX ADMIN — RUBIDIUM CHLORIDE RB-82 1 DOSE: 150 INJECTION, SOLUTION INTRAVENOUS at 12:51

## 2021-06-03 ENCOUNTER — TELEPHONE (OUTPATIENT)
Dept: CARDIOLOGY | Facility: CLINIC | Age: 81
End: 2021-06-03

## 2021-06-03 LAB
BH CV REST NUCLEAR ISOTOPE DOSE: 29.9 MCI
BH CV STRESS BP STAGE 1: NORMAL
BH CV STRESS BP STAGE 3: NORMAL
BH CV STRESS COMMENTS STAGE 1: NORMAL
BH CV STRESS DOSE REGADENOSON STAGE 1: 0.4
BH CV STRESS DURATION MIN STAGE 1: 1
BH CV STRESS DURATION MIN STAGE 2: 1
BH CV STRESS DURATION MIN STAGE 3: 1
BH CV STRESS DURATION MIN STAGE 4: 1
BH CV STRESS DURATION SEC STAGE 2: 0
BH CV STRESS HR STAGE 1: 79
BH CV STRESS HR STAGE 2: 97
BH CV STRESS HR STAGE 3: 94
BH CV STRESS HR STAGE 4: 94
BH CV STRESS NUCLEAR ISOTOPE DOSE: 29.9 MCI
BH CV STRESS O2 STAGE 1: 94
BH CV STRESS O2 STAGE 2: 97
BH CV STRESS O2 STAGE 3: 97
BH CV STRESS O2 STAGE 4: 94
BH CV STRESS PROTOCOL 1: NORMAL
BH CV STRESS RECOVERY BP: NORMAL MMHG
BH CV STRESS RECOVERY HR: 93 BPM
BH CV STRESS RECOVERY O2: 93 %
BH CV STRESS STAGE 1: 1
BH CV STRESS STAGE 2: 2
BH CV STRESS STAGE 3: 3
BH CV STRESS STAGE 4: 4
MAXIMAL PREDICTED HEART RATE: 140 BPM
PERCENT MAX PREDICTED HR: 70 %
STRESS BASELINE BP: NORMAL MMHG
STRESS BASELINE HR: 77 BPM
STRESS O2 SAT REST: 97 %
STRESS PERCENT HR: 82 %
STRESS POST ESTIMATED WORKLOAD: 1 METS
STRESS POST EXERCISE DUR MIN: 4 MIN
STRESS POST EXERCISE DUR SEC: 0 SEC
STRESS POST O2 SAT PEAK: 94 %
STRESS POST PEAK BP: NORMAL MMHG
STRESS POST PEAK HR: 98 BPM
STRESS TARGET HR: 119 BPM

## 2021-06-03 NOTE — TELEPHONE ENCOUNTER
Spoke with patient and advised that most recent stress test is within normal limits. Pt verbalized understanding

## 2021-07-19 ENCOUNTER — TELEPHONE (OUTPATIENT)
Dept: INTERNAL MEDICINE | Facility: CLINIC | Age: 81
End: 2021-07-19

## 2021-08-27 ENCOUNTER — PATIENT MESSAGE (OUTPATIENT)
Dept: INTERNAL MEDICINE | Facility: CLINIC | Age: 81
End: 2021-08-27

## 2021-08-27 DIAGNOSIS — E11.9 CONTROLLED TYPE 2 DIABETES MELLITUS WITHOUT COMPLICATION, WITHOUT LONG-TERM CURRENT USE OF INSULIN (HCC): Primary | ICD-10-CM

## 2021-08-27 NOTE — TELEPHONE ENCOUNTER
From: Shady Newell  To: Marko Lock MD  Sent: 8/27/2021 3:30 PM EDT  Subject: Non-Urgent Medical Question    You sent forms to be completed for my upcoming visit on the 9th. Can I get the blood tests at the [facility] in [place] and have the  hem available to you prior to my visit?

## 2021-09-01 ENCOUNTER — LAB (OUTPATIENT)
Dept: LAB | Facility: HOSPITAL | Age: 81
End: 2021-09-01

## 2021-09-01 DIAGNOSIS — E11.9 CONTROLLED TYPE 2 DIABETES MELLITUS WITHOUT COMPLICATION, WITHOUT LONG-TERM CURRENT USE OF INSULIN (HCC): ICD-10-CM

## 2021-09-01 LAB
ALBUMIN SERPL-MCNC: 4.1 G/DL (ref 3.5–5.2)
ALBUMIN UR-MCNC: 1.6 MG/DL
ALBUMIN/GLOB SERPL: 1.6 G/DL
ALP SERPL-CCNC: 72 U/L (ref 39–117)
ALT SERPL W P-5'-P-CCNC: 40 U/L (ref 1–41)
ANION GAP SERPL CALCULATED.3IONS-SCNC: 11.8 MMOL/L (ref 5–15)
AST SERPL-CCNC: 23 U/L (ref 1–40)
BILIRUB SERPL-MCNC: 0.6 MG/DL (ref 0–1.2)
BUN SERPL-MCNC: 11 MG/DL (ref 8–23)
BUN/CREAT SERPL: 15.9 (ref 7–25)
CALCIUM SPEC-SCNC: 9.2 MG/DL (ref 8.6–10.5)
CHLORIDE SERPL-SCNC: 103 MMOL/L (ref 98–107)
CO2 SERPL-SCNC: 25.2 MMOL/L (ref 22–29)
CREAT SERPL-MCNC: 0.69 MG/DL (ref 0.76–1.27)
GFR SERPL CREATININE-BSD FRML MDRD: 110 ML/MIN/1.73
GLOBULIN UR ELPH-MCNC: 2.5 GM/DL
GLUCOSE SERPL-MCNC: 163 MG/DL (ref 65–99)
HBA1C MFR BLD: 6.99 % (ref 4.8–5.6)
POTASSIUM SERPL-SCNC: 4.3 MMOL/L (ref 3.5–5.2)
PROT SERPL-MCNC: 6.6 G/DL (ref 6–8.5)
SODIUM SERPL-SCNC: 140 MMOL/L (ref 136–145)

## 2021-09-01 PROCEDURE — 82043 UR ALBUMIN QUANTITATIVE: CPT

## 2021-09-01 PROCEDURE — 80053 COMPREHEN METABOLIC PANEL: CPT

## 2021-09-01 PROCEDURE — 83036 HEMOGLOBIN GLYCOSYLATED A1C: CPT

## 2021-09-09 ENCOUNTER — OFFICE VISIT (OUTPATIENT)
Dept: INTERNAL MEDICINE | Facility: CLINIC | Age: 81
End: 2021-09-09

## 2021-09-09 VITALS
WEIGHT: 224.8 LBS | BODY MASS INDEX: 32.18 KG/M2 | HEIGHT: 70 IN | SYSTOLIC BLOOD PRESSURE: 136 MMHG | DIASTOLIC BLOOD PRESSURE: 78 MMHG | OXYGEN SATURATION: 97 % | HEART RATE: 70 BPM | TEMPERATURE: 98 F

## 2021-09-09 DIAGNOSIS — R01.1 SYSTOLIC MURMUR: ICD-10-CM

## 2021-09-09 DIAGNOSIS — I10 ESSENTIAL HYPERTENSION: ICD-10-CM

## 2021-09-09 DIAGNOSIS — E11.9 CONTROLLED TYPE 2 DIABETES MELLITUS WITHOUT COMPLICATION, WITHOUT LONG-TERM CURRENT USE OF INSULIN (HCC): ICD-10-CM

## 2021-09-09 DIAGNOSIS — E66.9 OBESITY, CLASS I, BMI 30.0-34.9 (SEE ACTUAL BMI): ICD-10-CM

## 2021-09-09 DIAGNOSIS — Z00.00 MEDICARE ANNUAL WELLNESS VISIT, SUBSEQUENT: Primary | ICD-10-CM

## 2021-09-09 DIAGNOSIS — E78.2 MIXED HYPERLIPIDEMIA: ICD-10-CM

## 2021-09-09 PROCEDURE — G0439 PPPS, SUBSEQ VISIT: HCPCS | Performed by: STUDENT IN AN ORGANIZED HEALTH CARE EDUCATION/TRAINING PROGRAM

## 2021-09-09 RX ORDER — RAMIPRIL 10 MG/1
10 CAPSULE ORAL DAILY
COMMUNITY
Start: 2021-08-08 | End: 2022-04-11

## 2021-09-09 NOTE — PROGRESS NOTES
"QUICK REFERENCE INFORMATION:  The ABCs of the Annual Wellness Visit    Subsequent Medicare Wellness Visit    . Wife is health care proxy. Have 2 sons (born -66 and -73) - one in [place], [place], the other in [place], [place]. Worked in bank in the past, later on building management. Moved from [place] to [facility] in [place] Summer 2021.    Patient has a past medical history of coronary heart disease without MI and was stented in  and 2016, CHF, T2DM (3/2021), GERD, dyslipidemia, osteoarthritis of multiple joints, osteoarthritis of multiple joints, hypertension w/ \"whitecoat\" component, BPH, obesity and elevated transaminase levels.    Patient is overall doing well.  No current complaints.  He occasionally checks his BP at home 120s/75.  Adjust and a contract yesterday to sell her house in [place], and they are pretty much settled in to the new intermediate [place] in [place].  He wants to continue her care here.  He was overall very physically active.    HEALTH RISK ASSESSMENT    1940    Recent Hospitalizations:  No hospitalization(s) within the last year..        Current Medical Providers:  Patient Care Team:  Marko Lock MD as PCP - General (Family Medicine)        Smoking Status:  Social History     Tobacco Use   Smoking Status Former Smoker   • Packs/day: 1.00   • Years: 10.00   • Pack years: 10.00   • Types: Cigarettes   • Start date:    • Quit date: 1969   • Years since quittin.2   Smokeless Tobacco Never Used       Alcohol Consumption:  Social History     Substance and Sexual Activity   Alcohol Use Yes   • Alcohol/week: 6.0 standard drinks   • Types: 6 Shots of liquor per week    Comment: weekly       Depression Screen:   PHQ-2/PHQ-9 Depression Screening 2021   Little interest or pleasure in doing things 0   Feeling down, depressed, or hopeless 0   Total Score 0       Health Habits and Functional and Cognitive Screening:  Functional & Cognitive Status 2021   Do " you have difficulty preparing food and eating? No   Do you have difficulty bathing yourself, getting dressed or grooming yourself? No   Do you have difficulty using the toilet? No   Do you have difficulty moving around from place to place? No   Do you have trouble with steps or getting out of a bed or a chair? No   Current Diet Well Balanced Diet   Dental Exam Up to date   Eye Exam Up to date   Exercise (times per week) 0 times per week   Current Exercises Include No Regular Exercise   Do you need help using the phone?  No   Are you deaf or do you have serious difficulty hearing?  Yes   Do you need help with transportation? No   Do you need help shopping? No   Do you need help preparing meals?  No   Do you need help with housework?  No   Do you need help with laundry? No   Do you need help taking your medications? No   Do you need help managing money? No   Do you ever drive or ride in a car without wearing a seat belt? No   Have you felt unusual stress, anger or loneliness in the last month? No   Who do you live with? Spouse   If you need help, do you have trouble finding someone available to you? No   Have you been bothered in the last four weeks by sexual problems? No   Do you have difficulty concentrating, remembering or making decisions? No       Fall Risk Screen:  [facility] Fall Risk Assessment was completed, and patient is at LOW risk for falls.Assessment completed on:2021    ACE III MINI   ATTENTION  What is the year: correct  What is the month of the year: correct  What is the day of the week?: correct  What is the date?: correct  MEMORY  Repeat address three times, only score third attempt: [address], [place]: 7  HOW MANY ANIMALS DID THE PATIENT NAME  Verbal Fluency -- Animal Names (0-25): 22+  CLOCK DRAWING  Clock Drawing: Clock Hands  MEMORY RECALL  Tell me what you remember about that name and address we were repeating at the beginnin  ACE TOTAL SCORE  Total ACE Score  - <25/30 strongly suggests cognitive impairment; <21/30 almost certainly shows dementia: 27    Does the patient have evidence of cognitive impairment? No    Aspirin use counseling: Taking ASA appropriately as indicated    Recent Lab Results:  CMP:  Lab Results   Component Value Date    BUN 11 09/01/2021    CREATININE 0.69 (L) 09/01/2021    EGFRIFNONA 110 09/01/2021    BCR 15.9 09/01/2021     09/01/2021    K 4.3 09/01/2021    CO2 25.2 09/01/2021    CALCIUM 9.2 09/01/2021    ALBUMIN 4.10 09/01/2021    BILITOT 0.6 09/01/2021    ALKPHOS 72 09/01/2021    AST 23 09/01/2021    ALT 40 09/01/2021     HbA1c:  Lab Results   Component Value Date    HGBA1C 6.99 (H) 09/01/2021    HGBA1C 6.9 04/15/2021     Microalbumin:  Lab Results   Component Value Date    MICROALBUR 1.6 09/01/2021     Lipid Panel  Lab Results   Component Value Date    CHOL 115 03/04/2021    TRIG 82 03/04/2021    HDL 41 03/04/2021    LDL 58 03/04/2021    AST 23 09/01/2021    ALT 40 09/01/2021       Visual Acuity:  No exam data present    Age-appropriate Screening Schedule:  Refer to the list below for future screening recommendations based on patient's age, sex and/or medical conditions. Orders for these recommended tests are listed in the plan section. The patient has been provided with a written plan.    Health Maintenance   Topic Date Due   • TDAP/TD VACCINES (1 - Tdap) Never done   • INFLUENZA VACCINE  10/01/2021   • HEMOGLOBIN A1C  03/01/2022   • LIPID PANEL  03/04/2022   • DIABETIC EYE EXAM  05/07/2022   • URINE MICROALBUMIN  09/01/2022   • ZOSTER VACCINE  Completed        Subjective   History of Present Illness    Shady Newell is a 81 y.o. male who presents for a Subsequent Wellness Visit.    CHRONIC CONDITIONS    The following portions of the patient's history were reviewed and updated as appropriate: allergies, current medications, past family history, past medical history, past social history, past surgical history and problem  list.    Outpatient Medications Prior to Visit   Medication Sig Dispense Refill   • aspirin (aspirin) 81 MG EC tablet Take 1 tablet by mouth Daily. 1 tablet 0   • atorvastatin (LIPITOR) 10 MG tablet Take 1 tablet by mouth every night at bedtime. 90 tablet 3   • carvedilol (COREG) 6.25 MG tablet Take 1 tablet by mouth 2 (Two) Times a Day. 180 tablet 3   • clopidogrel (PLAVIX) 75 MG tablet Take 1 tablet by mouth Daily. 90 tablet 3   • famotidine (PEPCID) 20 MG tablet Take 1 tablet by mouth every night at bedtime. 90 tablet 3   • finasteride (PROSCAR) 5 MG tablet Take 5 mg by mouth daily.     • hydroCHLOROthiazide (HYDRODIURIL) 12.5 MG tablet Take 1 tablet by mouth Daily. 90 tablet 3   • Loratadine 10 MG capsule Take 1 capsule by mouth As Needed (allergy). 90 capsule 3   • metFORMIN ER (GLUCOPHAGE-XR) 500 MG 24 hr tablet Take 1 tablet by mouth 2 (two) times a day. 180 tablet 1   • Multiple Vitamins-Minerals (MULTIVITAMIN ADULT PO) Take 1 tablet by mouth Daily.     • nitroglycerin (NITROSTAT) 0.4 MG SL tablet DISSOLVE 1 TABLET UNDER TONGUE ONCE EVERY 5 MINS MAX OF 3 THEN CALL 911 IF NO RELIEF  5   • ramipril (ALTACE) 10 MG capsule Take 10 mg by mouth Daily.     • tamsulosin (FLOMAX) 0.4 MG capsule 24 hr capsule Take 1 capsule by mouth every night.       No facility-administered medications prior to visit.       Patient Active Problem List   Diagnosis   • Mixed hyperlipidemia   • Hypertension   • Obesity, Class I, BMI 30.0-34.9 (see actual BMI)   • Primary osteoarthritis involving multiple joints   • Benign prostatic hyperplasia with weak urinary stream   • Gastroesophageal reflux disease without esophagitis   • Controlled type 2 diabetes mellitus without complication, without long-term current use of insulin (CMS/[facility])   • Seasonal allergies   • Systolic murmur   • Full thickness rotator cuff tear   • Elevated transaminase level       Advance Care Planning:  ACP discussion was held with the patient during this visit.  "Patient has an advance directive (not in EMR), copy requested.    Identification of Risk Factors:  Risk factors include: Advance Directive Discussion.    Review of Systems    Compared to one year ago, the patient feels his physical health is better.  Compared to one year ago, the patient feels his mental health is the same.    Objective     Physical Exam  Vitals reviewed.   Constitutional:       Appearance: Normal appearance. He is obese. He is not ill-appearing.   HENT:      Head: Normocephalic and atraumatic.      Nose: No congestion.   Eyes:      Extraocular Movements: Extraocular movements intact.      Conjunctiva/sclera: Conjunctivae normal.   Cardiovascular:      Rate and Rhythm: Normal rate and regular rhythm.      Heart sounds: Murmur heard.        Comments: Systolic murmur 2/6 over right second intercostal space.  Pulmonary:      Effort: Pulmonary effort is normal.      Breath sounds: Normal breath sounds.   Abdominal:      General: There is no distension.      Palpations: Abdomen is soft. There is no mass.      Tenderness: There is no abdominal tenderness.   Musculoskeletal:      Cervical back: Neck supple.      Right lower leg: No edema.      Left lower leg: No edema.   Skin:     General: Skin is warm and dry.   Neurological:      Mental Status: He is alert and oriented to person, place, and time. Mental status is at baseline.   Psychiatric:         Behavior: Behavior normal.         Thought Content: Thought content normal.          Procedures     Vitals:    09/09/21 0839 09/09/21 0850 09/09/21 0930   BP: 154/90 148/90 136/78   BP Location: Left arm Left arm Left arm   Patient Position: Sitting Sitting Sitting   Cuff Size: Adult Adult Large Adult   Pulse: 70     Temp: 98 °F (36.7 °C)     TempSrc: Temporal     SpO2: 97%     Weight: 102 kg (224 lb 12.8 oz)     Height: 177.8 cm (70\")  Comment: w/shoes     PainSc: 0-No pain         Patient's Body mass index is 32.26 kg/m². indicating that he is obese (BMI " >30). Obesity-related health conditions include the following: hypertension, diabetes mellitus, dyslipidemias and osteoarthritis. Obesity is unchanged. BMI is is above average; BMI management plan is completed. We discussed portion control and increasing exercise..      Assessment/Plan   Problem List Items Addressed This Visit        Unprioritized    Controlled type 2 diabetes mellitus without complication, without long-term current use of insulin (CMS/[facility]) (Chronic)    Overview     2017 A1c 6.4  2021 A1c 7.34         Relevant Medications    metFORMIN ER (GLUCOPHAGE-XR) 500 MG 24 hr tablet    Hypertension    Relevant Medications    hydroCHLOROthiazide (HYDRODIURIL) 12.5 MG tablet    carvedilol (COREG) 6.25 MG tablet    ramipril (ALTACE) 10 MG capsule    Mixed hyperlipidemia    Overview     a. April 2012 - Total cholesterol 104, triglycerides 59, HDL 45, LDL 47.  Normal LFTs.    b. April 2013 - Total cholesterol 115, triglycerides 91, HDL 39, LDL 58.         Relevant Medications    atorvastatin (LIPITOR) 10 MG tablet    Obesity, Class I, BMI 30.0-34.9 (see actual BMI)    Overview     BMI of 34         Systolic murmur    Overview     3/2021: Grade 2/6 R IC space           Other Visit Diagnoses     Medicare annual wellness visit, subsequent    -  Primary        Patient Self-Management and Personalized Health Advice  The patient has been provided with information about: diet and weight management and preventive services including:   · Annual Wellness Visit (AWV).    Outpatient Encounter Medications as of 9/9/2021   Medication Sig Dispense Refill   • aspirin (aspirin) 81 MG EC tablet Take 1 tablet by mouth Daily. 1 tablet 0   • atorvastatin (LIPITOR) 10 MG tablet Take 1 tablet by mouth every night at bedtime. 90 tablet 3   • carvedilol (COREG) 6.25 MG tablet Take 1 tablet by mouth 2 (Two) Times a Day. 180 tablet 3   • clopidogrel (PLAVIX) 75 MG tablet Take 1 tablet by mouth Daily. 90 tablet 3   • famotidine (PEPCID) 20  MG tablet Take 1 tablet by mouth every night at bedtime. 90 tablet 3   • finasteride (PROSCAR) 5 MG tablet Take 5 mg by mouth daily.     • hydroCHLOROthiazide (HYDRODIURIL) 12.5 MG tablet Take 1 tablet by mouth Daily. 90 tablet 3   • Loratadine 10 MG capsule Take 1 capsule by mouth As Needed (allergy). 90 capsule 3   • metFORMIN ER (GLUCOPHAGE-XR) 500 MG 24 hr tablet Take 1 tablet by mouth 2 (two) times a day. 180 tablet 1   • Multiple Vitamins-Minerals (MULTIVITAMIN ADULT PO) Take 1 tablet by mouth Daily.     • nitroglycerin (NITROSTAT) 0.4 MG SL tablet DISSOLVE 1 TABLET UNDER TONGUE ONCE EVERY 5 MINS MAX OF 3 THEN CALL 911 IF NO RELIEF  5   • ramipril (ALTACE) 10 MG capsule Take 10 mg by mouth Daily.     • tamsulosin (FLOMAX) 0.4 MG capsule 24 hr capsule Take 1 capsule by mouth every night.       No facility-administered encounter medications on file as of 9/9/2021.       Reviewed use of high risk medication in the elderly: yes  Reviewed for potential of harmful drug interactions in the elderly: yes      1. Medicare annual wellness visit, subsequent      2. Controlled type 2 diabetes mellitus without complication, without long-term current use of insulin (CMS/[facility])  Hemoglobin A1C   Date Value Ref Range Status   09/01/2021 6.99 (H) 4.80 - 5.60 % Final   04/15/2021 6.9 % Final   03/04/2021 7.34 (H) 4.80 - 5.60 % Final   08/28/2017 6.40 (H) 4.80 - 5.60 % Final   Good glycemic control at goal.  We will continue to monitor.  Patient will return for visit in 4 months.    3. Essential hypertension  BP Readings from Last 3 Encounters:   09/09/21 136/78   06/02/21 127/81   05/14/21 148/66   Initial blood pressure is elevated today, repeat is.  Additionally home blood pressures are well within normal and patient likely has a component of whitecoat phenomenon.    4. Mixed hyperlipidemia  Continue on atorvastatin 10 mg.    5. Systolic murmur  Recently did stress test with cardiology in May.  Asymptomatic.    6. Obesity, Class  I, BMI 30.0-34.9 (see actual BMI)  Counseling as per above    Follow Up:  Return in about 4 months (around 1/9/2022) for Recheck.     There are no Patient Instructions on file for this visit.    An After Visit Summary and PPPS with all of these plans were given to the patient.      Future Appointments       Provider Department Center    1/10/2022 9:15 AM Marko Lock MD [facility] INTERNAL MEDICINE [place]    5/27/2022 9:00 AM Markus Zhu MD [facility] CARDIOLOGY [place]        Marko Lock MD

## 2021-09-17 DIAGNOSIS — E11.9 CONTROLLED TYPE 2 DIABETES MELLITUS WITHOUT COMPLICATION, WITHOUT LONG-TERM CURRENT USE OF INSULIN (HCC): ICD-10-CM

## 2021-09-17 RX ORDER — METFORMIN HYDROCHLORIDE 500 MG/1
500 TABLET, EXTENDED RELEASE ORAL 2 TIMES DAILY
Qty: 180 TABLET | Refills: 1 | Status: SHIPPED | OUTPATIENT
Start: 2021-09-17 | End: 2021-11-10 | Stop reason: SINTOL

## 2021-09-30 ENCOUNTER — PATIENT MESSAGE (OUTPATIENT)
Dept: INTERNAL MEDICINE | Facility: CLINIC | Age: 81
End: 2021-09-30

## 2021-09-30 NOTE — TELEPHONE ENCOUNTER
From: Shady Newell  To: Marko Lock MD  Sent: 9/30/2021 1:25 PM EDT  Subject: Non-Urgent Medical Question    I wish to have recorded my Covid - 19 booster shot--the 3rd shot. I received Pfizer [id number] ON 09/27/2021 BY [facility] [id number].

## 2021-11-01 ENCOUNTER — PATIENT MESSAGE (OUTPATIENT)
Dept: INTERNAL MEDICINE | Facility: CLINIC | Age: 81
End: 2021-11-01

## 2021-11-01 NOTE — TELEPHONE ENCOUNTER
From: Shady Newell  To: Marko Lock MD  Sent: 11/1/2021 10:54 AM EDT  Subject: METFORMIN HCL  MG TABLET    After your response on 10/25, I started taking a tablet with breakfast. By 10/30, I had had diarrhea for three consecutive days including on Sunday. On Sunday, I had an accident that I was unable to control long enough to reach a toilet.    I think it is clear this drug doesn't work for me, so as of 11/1 I have again stopped taking it.

## 2021-11-10 ENCOUNTER — OFFICE VISIT (OUTPATIENT)
Dept: INTERNAL MEDICINE | Facility: CLINIC | Age: 81
End: 2021-11-10

## 2021-11-10 VITALS
HEIGHT: 70 IN | WEIGHT: 232 LBS | TEMPERATURE: 98.6 F | BODY MASS INDEX: 33.21 KG/M2 | HEART RATE: 81 BPM | SYSTOLIC BLOOD PRESSURE: 128 MMHG | DIASTOLIC BLOOD PRESSURE: 82 MMHG

## 2021-11-10 DIAGNOSIS — I10 ESSENTIAL HYPERTENSION: Chronic | ICD-10-CM

## 2021-11-10 DIAGNOSIS — E11.9 CONTROLLED TYPE 2 DIABETES MELLITUS WITHOUT COMPLICATION, WITHOUT LONG-TERM CURRENT USE OF INSULIN (HCC): Primary | Chronic | ICD-10-CM

## 2021-11-10 PROBLEM — H02.889 MEIBOMIAN GLAND DYSFUNCTION (MGD): Status: ACTIVE | Noted: 2017-03-03

## 2021-11-10 PROBLEM — H40.003 GLAUCOMA SUSPECT OF BOTH EYES: Status: ACTIVE | Noted: 2017-03-03

## 2021-11-10 PROBLEM — H35.3131 NONEXUDATIVE AGE-RELATED MACULAR DEGENERATION, BILATERAL, EARLY DRY STAGE: Status: ACTIVE | Noted: 2021-05-07

## 2021-11-10 PROCEDURE — 99214 OFFICE O/P EST MOD 30 MIN: CPT | Performed by: STUDENT IN AN ORGANIZED HEALTH CARE EDUCATION/TRAINING PROGRAM

## 2021-11-10 NOTE — PROGRESS NOTES
"Chief Complaint  Shady Newell is a 81 y.o. male presenting for Diabetes and Medication Problem (causing diarrhea).     . Wife is health care proxy. Have 2 sons (born -66 and -73) - one in [facility] [place], the other in [place], [place]. Worked in bank in the past, later on building management. Moved from [place] to [facility] home in [facility] 2021.     Patient has a past medical history of coronary heart disease without MI and was stented in 2006 and 2016, CHF, T2DM (3/2021), GERD, dyslipidemia, osteoarthritis of multiple joints, osteoarthritis of multiple joints, hypertension w/ \"whitecoat\" component, BPH, obesity and elevated transaminase levels.    History of Present Illness  Patient is here for follow-up of his diabetes due to diarrhea on Metformin.  He has tolerated in the past, but now he was starting to get diarrhea/loose bowels, and this ceased when he stopped the Metformin.  He tried again but Metformin 500 mg ER and his diarrhea recurred right away and then stopped again when he discontinued the medication.    Patient is otherwise doing well, has no current complaints.    The following portions of the patient's history were reviewed and updated as appropriate: allergies, current medications, past family history, past medical history, past social history, past surgical history and problem list.    Objective  /82 (BP Location: Left arm, Patient Position: Sitting, Cuff Size: Large Adult)   Pulse 81   Temp 98.6 °F (37 °C)   Ht 177.8 cm (70\")   Wt 105 kg (232 lb)   BMI 33.29 kg/m²     Physical Exam  Vitals reviewed.   Constitutional:       Appearance: Normal appearance.   HENT:      Head: Normocephalic and atraumatic.      Nose: Nose normal. No congestion.      Mouth/Throat:      Mouth: Mucous membranes are moist.   Eyes:      Extraocular Movements: Extraocular movements intact.      Conjunctiva/sclera: Conjunctivae normal.   Cardiovascular:      Rate and Rhythm: Normal rate and " regular rhythm.      Heart sounds: Murmur heard.       Pulmonary:      Effort: Pulmonary effort is normal.      Breath sounds: Normal breath sounds.   Musculoskeletal:      Cervical back: Neck supple.      Right lower leg: No edema.      Left lower leg: No edema.   Skin:     General: Skin is warm and dry.   Neurological:      Mental Status: He is alert and oriented to person, place, and time. Mental status is at baseline.   Psychiatric:         Behavior: Behavior normal.         Thought Content: Thought content normal.         Assessment/Plan   1. Controlled type 2 diabetes mellitus without complication, without long-term current use of insulin (HCC)  Hemoglobin A1C   Date Value Ref Range Status   09/01/2021 6.99 (H) 4.80 - 5.60 % Final   04/15/2021 6.9 % Final   03/04/2021 7.34 (H) 4.80 - 5.60 % Final   08/28/2017 6.40 (H) 4.80 - 5.60 % Final   His diabetes has been well controlled, unfortunately he is not able to tolerate Metformin anymore.  We will do trial of Januvia.  Patient does not have a history of heart failure, I have counseled him thoroughly on return to care if chest pain, shortness of breath, worsening swelling of his feet or abdominal/epigastric pain or nausea.  All of which could be symptoms of heart failure or pancreatitis.  Patient verbalizes understanding.  His creatinine is normal.  We will recheck creatinine and A1c on follow-up visit in 2 months.  - SITagliptin (Januvia) 100 MG tablet; Take 1 tablet by mouth Daily.  Dispense: 90 tablet; Refill: 1    2. Essential hypertension  BP Readings from Last 3 Encounters:   11/10/21 128/82   09/09/21 136/78   06/02/21 127/81   Initial blood pressure elevated, repeat is normal and at goal.  He does likely have a component of whitecoat phenomenon.      Return for Next scheduled follow up.    Future Appointments       Provider Department Center    1/10/2022 9:15 AM Marko Lock MD [facility] INTERNAL MEDICINE [place]    5/27/2022 9:00 AM  Markus Zhu MD [facility] CARDIOLOGY [place]          Marko Lock MD  Family Medicine  11/10/2021

## 2021-11-12 ENCOUNTER — PATIENT MESSAGE (OUTPATIENT)
Dept: INTERNAL MEDICINE | Facility: CLINIC | Age: 81
End: 2021-11-12

## 2021-11-12 DIAGNOSIS — E11.9 CONTROLLED TYPE 2 DIABETES MELLITUS WITHOUT COMPLICATION, WITHOUT LONG-TERM CURRENT USE OF INSULIN (HCC): Primary | ICD-10-CM

## 2021-11-12 RX ORDER — GLIMEPIRIDE 1 MG/1
1 TABLET ORAL
Qty: 90 TABLET | Refills: 0 | Status: SHIPPED | OUTPATIENT
Start: 2021-11-12 | End: 2022-02-07

## 2021-11-12 RX ORDER — BLOOD-GLUCOSE METER
1 KIT MISCELLANEOUS DAILY PRN
Qty: 1 EACH | Refills: 0 | Status: SHIPPED | OUTPATIENT
Start: 2021-11-12

## 2021-11-12 NOTE — TELEPHONE ENCOUNTER
From: Shady Newell  To: Marko Lock MD  Sent: 11/12/2021 1:42 PM EST  Subject: New Medicine    Dr. Lock, the new medicine you prescribed on Wednesday is costly, like buying gold! [facility] priced it at $422.00 after insurance for a 90 day supply. I refused to buy it.    Surely there is something else that's available that is more reasonably priced.    The [facility] didn't have the new drug in stock. They had to order it and no wonder they didn't stock it as they can't sell such an over priced product.    Let's try to find something else.

## 2022-01-10 ENCOUNTER — OFFICE VISIT (OUTPATIENT)
Dept: INTERNAL MEDICINE | Facility: CLINIC | Age: 82
End: 2022-01-10

## 2022-01-10 VITALS
OXYGEN SATURATION: 97 % | TEMPERATURE: 98.4 F | HEIGHT: 70 IN | DIASTOLIC BLOOD PRESSURE: 80 MMHG | WEIGHT: 237.2 LBS | SYSTOLIC BLOOD PRESSURE: 134 MMHG | HEART RATE: 75 BPM | BODY MASS INDEX: 33.96 KG/M2

## 2022-01-10 DIAGNOSIS — E11.9 CONTROLLED TYPE 2 DIABETES MELLITUS WITHOUT COMPLICATION, WITHOUT LONG-TERM CURRENT USE OF INSULIN: Primary | Chronic | ICD-10-CM

## 2022-01-10 DIAGNOSIS — E66.9 OBESITY, CLASS I, BMI 30-34.9: ICD-10-CM

## 2022-01-10 DIAGNOSIS — H61.91 SKIN LESION OF RIGHT EAR: ICD-10-CM

## 2022-01-10 DIAGNOSIS — I10 ESSENTIAL HYPERTENSION: Chronic | ICD-10-CM

## 2022-01-10 LAB
EXPIRATION DATE: NORMAL
HBA1C MFR BLD: 6.6 %
Lab: NORMAL

## 2022-01-10 PROCEDURE — 99214 OFFICE O/P EST MOD 30 MIN: CPT | Performed by: STUDENT IN AN ORGANIZED HEALTH CARE EDUCATION/TRAINING PROGRAM

## 2022-01-10 PROCEDURE — 83036 HEMOGLOBIN GLYCOSYLATED A1C: CPT | Performed by: STUDENT IN AN ORGANIZED HEALTH CARE EDUCATION/TRAINING PROGRAM

## 2022-01-10 PROCEDURE — 3044F HG A1C LEVEL LT 7.0%: CPT | Performed by: STUDENT IN AN ORGANIZED HEALTH CARE EDUCATION/TRAINING PROGRAM

## 2022-01-10 NOTE — PROGRESS NOTES
"Chief Complaint  Shady Newell is a 81 y.o. male presenting for Diabetes (4 mo f/u) and Weight Gain.     . Wife is health care proxy. Have 2 sons (born -66 and -73) - one in [place], [place], the other in [place], [place]. Worked in bank in the past, later on building management. Moved from [place] to prison home in [place] Summer 2021.     Patient has a past medical history of coronary heart disease without MI and was stented in 2006 and 2016, CHF, T2DM (3/2021), GERD, dyslipidemia, osteoarthritis of multiple joints, osteoarthritis of multiple joints, hypertension w/ \"whitecoat\" component, BPH, obesity and elevated transaminase levels.      History of Present Illness  Patient is here for follow-up.  He is overall doing well.    He had originally transition from metformin to Januvia because of GI side effects of metformin.  He did not start Januvia because of the cost with co-pay over $400 per month.  He chose to start on glimepiride 1 mg, which he has tolerated well.  Did not check his blood sugars recently.    He does not check his blood pressures at home.    Over the last 2 months he has noticed scabbing skin lesion on the inside of right ear.  He has been trying to get in with his dermatologist in [place], with rather establish with a dermatologist in [place] for this.    The following portions of the patient's history were reviewed and updated as appropriate: allergies, current medications, past family history, past medical history, past social history, past surgical history and problem list.    Objective  /80   Pulse 75   Temp 98.4 °F (36.9 °C) (Temporal)   Ht 177.8 cm (70\")   Wt 108 kg (237 lb 3.2 oz)   SpO2 97%   BMI 34.03 kg/m²     Physical Exam  Vitals reviewed.   Constitutional:       Appearance: Normal appearance.   HENT:      Head: Normocephalic and atraumatic.      Nose: No congestion.   Eyes:      Extraocular Movements: Extraocular movements intact.      " Conjunctiva/sclera: Conjunctivae normal.   Cardiovascular:      Rate and Rhythm: Normal rate and regular rhythm.      Heart sounds: Normal heart sounds. No murmur heard.      Pulmonary:      Effort: Pulmonary effort is normal.      Breath sounds: Normal breath sounds.   Musculoskeletal:      Cervical back: Neck supple.      Right lower leg: No edema.      Left lower leg: No edema.   Skin:     General: Skin is warm and dry.      Findings: Lesion present.      Comments: Skin lesion of right ear with scab   Neurological:      Mental Status: He is alert and oriented to person, place, and time. Mental status is at baseline.   Psychiatric:         Behavior: Behavior normal.         Thought Content: Thought content normal.         Assessment/Plan   1. Controlled type 2 diabetes mellitus without complication, without long-term current use of insulin ([facility])  Hemoglobin A1C   Date Value Ref Range Status   01/10/2022 6.6 % Final   09/01/2021 6.99 (H) 4.80 - 5.60 % Final   04/15/2021 6.9 % Final   03/04/2021 7.34 (H) 4.80 - 5.60 % Final   08/28/2017 6.40 (H) 4.80 - 5.60 % Final   Blood sugar improving.  I do recommend he checks his fasting blood sugar occasionally since it has trended down somewhat.  We have discussed the risk of hypoglycemia when on glimepiride, which is why it is more commonly avoided now.  I have counseled patient on symptoms of hypoglycemia including feeling jittery, sweaty, and if any such symptoms he should check his blood sugar right away.  I have also asked that he notifies me if he starts to get low fasting blood sugars below 70-80.  For now he will continue on Amaryl 1 mg daily.  We could potentially consider doing it every other day if his A1c keeps trending down.  - POC Glycosylated Hemoglobin (Hb A1C)    2. Essential hypertension  BP Readings from Last 3 Encounters:   01/10/22 134/80   11/10/21 128/82   09/09/21 136/78   Blood pressure currently fairly well controlled.  He is known to have component  of whitecoat phenomenon, so I think he is close to goal with his current regimen.  No changes at this time.    3. Skin lesion of right ear  Will refer to dermatology as requested for evaluation.  - Ambulatory Referral to Dermatology    4. Obesity, Class I, BMI 30-34.9  Patient's Body mass index is 34.03 kg/m². indicating that he is obese (BMI >30). Obesity-related health conditions include the following: hypertension, diabetes mellitus, dyslipidemias, GERD and osteoarthritis. Obesity is worsening. BMI is is above average; BMI management plan is completed. We discussed low calorie, low carb based diet program and increasing exercise..  Patient is not doing cardio exercise about 45 minutes a day, 5 days a week.  Also discussed making some changes to his diet to reduce weight.        Return in about 4 months (around 5/10/2022) for Recheck.    Future Appointments       Provider Department Center    5/16/2022 10:30 AM Marko Lock MD [facility] INTERNAL MEDICINE [place]    5/27/2022 9:00 AM Markus Zhu MD [facility] CARDIOLOGY [place]          Marko Lock MD  Family Medicine  01/10/2022

## 2022-02-06 DIAGNOSIS — E11.9 CONTROLLED TYPE 2 DIABETES MELLITUS WITHOUT COMPLICATION, WITHOUT LONG-TERM CURRENT USE OF INSULIN: ICD-10-CM

## 2022-02-07 RX ORDER — GLIMEPIRIDE 1 MG/1
TABLET ORAL
Qty: 90 TABLET | Refills: 0 | Status: SHIPPED | OUTPATIENT
Start: 2022-02-07 | End: 2022-04-11

## 2022-02-20 DIAGNOSIS — I10 ESSENTIAL HYPERTENSION: ICD-10-CM

## 2022-02-21 RX ORDER — HYDROCHLOROTHIAZIDE 12.5 MG/1
TABLET ORAL
Qty: 90 TABLET | Refills: 3 | Status: SHIPPED | OUTPATIENT
Start: 2022-02-21 | End: 2023-02-09

## 2022-03-11 DIAGNOSIS — E78.5 DYSLIPIDEMIA: ICD-10-CM

## 2022-03-11 RX ORDER — ATORVASTATIN CALCIUM 10 MG/1
TABLET, FILM COATED ORAL
Qty: 90 TABLET | Refills: 2 | Status: SHIPPED | OUTPATIENT
Start: 2022-03-11 | End: 2022-09-19

## 2022-03-11 NOTE — TELEPHONE ENCOUNTER
Next appt 5/2022  Lab Results   Component Value Date    CHOL 115 03/04/2021    TRIG 82 03/04/2021    HDL 41 03/04/2021    LDL 58 03/04/2021

## 2022-03-23 DIAGNOSIS — K21.9 GASTROESOPHAGEAL REFLUX DISEASE WITHOUT ESOPHAGITIS: ICD-10-CM

## 2022-03-23 RX ORDER — FAMOTIDINE 20 MG/1
TABLET, FILM COATED ORAL
Qty: 90 TABLET | Refills: 3 | Status: SHIPPED | OUTPATIENT
Start: 2022-03-23 | End: 2022-11-15

## 2022-03-23 NOTE — TELEPHONE ENCOUNTER
Rx Refill Note  Requested Prescriptions     Pending Prescriptions Disp Refills   • famotidine (PEPCID) 20 MG tablet [Pharmacy Med Name: FAMOTIDINE 20 MG TABLET] 90 tablet 3     Sig: TAKE 1 TABLET BY MOUTH EVERYDAY AT BEDTIME      Last office visit with prescribing clinician: 1/10/2022      Next office visit with prescribing clinician: 5/16/2022            Adilia Amaya RN  03/23/22, 09:41 EDT

## 2022-04-10 DIAGNOSIS — Z95.5 HISTORY OF CORONARY ANGIOPLASTY WITH INSERTION OF STENT: ICD-10-CM

## 2022-04-10 DIAGNOSIS — I10 ESSENTIAL HYPERTENSION: ICD-10-CM

## 2022-04-10 DIAGNOSIS — E11.9 CONTROLLED TYPE 2 DIABETES MELLITUS WITHOUT COMPLICATION, WITHOUT LONG-TERM CURRENT USE OF INSULIN: ICD-10-CM

## 2022-04-11 RX ORDER — CARVEDILOL 6.25 MG/1
TABLET ORAL
Qty: 180 TABLET | Refills: 1 | Status: SHIPPED | OUTPATIENT
Start: 2022-04-11 | End: 2022-09-19

## 2022-04-11 RX ORDER — CLOPIDOGREL BISULFATE 75 MG/1
TABLET ORAL
Qty: 90 TABLET | Refills: 1 | Status: SHIPPED | OUTPATIENT
Start: 2022-04-11 | End: 2022-07-08

## 2022-04-11 RX ORDER — RAMIPRIL 10 MG/1
CAPSULE ORAL
Qty: 180 CAPSULE | Refills: 1 | Status: SHIPPED | OUTPATIENT
Start: 2022-04-11 | End: 2022-09-19

## 2022-04-11 RX ORDER — GLIMEPIRIDE 1 MG/1
TABLET ORAL
Qty: 90 TABLET | Refills: 1 | Status: SHIPPED | OUTPATIENT
Start: 2022-04-11 | End: 2022-09-19

## 2022-04-11 RX ORDER — LORATADINE 10 MG/1
TABLET ORAL
Qty: 90 TABLET | Refills: 1 | Status: SHIPPED | OUTPATIENT
Start: 2022-04-11

## 2022-04-11 NOTE — TELEPHONE ENCOUNTER
Rx Refill Note  Requested Prescriptions     Pending Prescriptions Disp Refills   • clopidogrel (PLAVIX) 75 MG tablet [Pharmacy Med Name: CLOPIDOGREL 75 MG TABLET] 90 tablet 1     Sig: TAKE 1 TABLET BY MOUTH EVERY DAY   • carvedilol (COREG) 6.25 MG tablet [Pharmacy Med Name: CARVEDILOL 6.25 MG TABLET] 180 tablet 1     Sig: TAKE 1 TABLET BY MOUTH TWICE A DAY   • ramipril (ALTACE) 10 MG capsule [Pharmacy Med Name: RAMIPRIL 10 MG CAPSULE] 180 capsule 1     Sig: TAKE 1 CAPSULE BY MOUTH TWICE A DAY   • loratadine (CLARITIN) 10 MG tablet [Pharmacy Med Name: LORATADINE 10 MG TABLET] 90 tablet      Sig: TAKE 1 TABLET BY MOUTH EVERY DAY AS NEEDED FOR ALLERGIES   • glimepiride (AMARYL) 1 MG tablet [Pharmacy Med Name: GLIMEPIRIDE 1 MG TABLET] 90 tablet 0     Sig: TAKE 1 TABLET BY MOUTH EVERY DAY BEFORE BREAKFAST      Last office visit with prescribing clinician: 1/10/2022      Next office visit with prescribing clinician: 5/16/2022            Leeann Juan LPN  04/11/22, 10:04 EDT

## 2022-05-16 ENCOUNTER — OFFICE VISIT (OUTPATIENT)
Dept: INTERNAL MEDICINE | Facility: CLINIC | Age: 82
End: 2022-05-16

## 2022-05-16 VITALS
SYSTOLIC BLOOD PRESSURE: 120 MMHG | HEART RATE: 72 BPM | TEMPERATURE: 98 F | WEIGHT: 240 LBS | HEIGHT: 70 IN | BODY MASS INDEX: 34.36 KG/M2 | DIASTOLIC BLOOD PRESSURE: 74 MMHG

## 2022-05-16 DIAGNOSIS — Z20.822 CLOSE EXPOSURE TO COVID-19 VIRUS: ICD-10-CM

## 2022-05-16 DIAGNOSIS — E66.9 OBESITY, CLASS I, BMI 30-34.9: ICD-10-CM

## 2022-05-16 DIAGNOSIS — Z95.5 HISTORY OF CORONARY ANGIOPLASTY WITH INSERTION OF STENT: ICD-10-CM

## 2022-05-16 DIAGNOSIS — I10 ESSENTIAL HYPERTENSION: Chronic | ICD-10-CM

## 2022-05-16 DIAGNOSIS — E11.9 CONTROLLED TYPE 2 DIABETES MELLITUS WITHOUT COMPLICATION, WITHOUT LONG-TERM CURRENT USE OF INSULIN: Primary | Chronic | ICD-10-CM

## 2022-05-16 DIAGNOSIS — E78.2 MIXED HYPERLIPIDEMIA: Chronic | ICD-10-CM

## 2022-05-16 LAB
EXPIRATION DATE: NORMAL
HBA1C MFR BLD: 6.6 %
Lab: NORMAL

## 2022-05-16 PROCEDURE — 83036 HEMOGLOBIN GLYCOSYLATED A1C: CPT | Performed by: STUDENT IN AN ORGANIZED HEALTH CARE EDUCATION/TRAINING PROGRAM

## 2022-05-16 PROCEDURE — 99214 OFFICE O/P EST MOD 30 MIN: CPT | Performed by: STUDENT IN AN ORGANIZED HEALTH CARE EDUCATION/TRAINING PROGRAM

## 2022-05-16 PROCEDURE — 3044F HG A1C LEVEL LT 7.0%: CPT | Performed by: STUDENT IN AN ORGANIZED HEALTH CARE EDUCATION/TRAINING PROGRAM

## 2022-05-16 RX ORDER — NITROGLYCERIN 0.4 MG/1
0.4 TABLET SUBLINGUAL
Qty: 30 TABLET | Refills: 1 | Status: SHIPPED | OUTPATIENT
Start: 2022-05-16

## 2022-05-16 NOTE — PROGRESS NOTES
"Chief Complaint  Shady Newell is a 81 y.o. male presenting for Exposure To Known Illness (Covid /) and Diabetes.     . Wife is health care proxy. Have 2 sons (born -66 and -73) - one in [place], [place], the other in [place], [place]. Worked in bank in the past, later on building management. Moved from [place] to [facility] home in [facility] 2021.     Patient has a past medical history of coronary heart disease without MI and was stented in 2006 and 2016, CHF, T2DM (3/2021), GERD, dyslipidemia, osteoarthritis of multiple joints, osteoarthritis of multiple joints, hypertension w/ \"whitecoat\" component, BPH, obesity and elevated transaminase levels.    History of Present Illness  Patient is here for follow-up.    He is overall doing well, they were on vacation in [place] last week, his wife started yesterday with sick symptoms.  This morning he tested positive for COVID.  Patient has his second booster from [employer] on 4/21/2022.  He is not experiencing any symptoms.    Patient has been a few pounds since his last visit, he states that food at the living facility is not of the best quality, amount of fat in the food and unfortunately they do not have.  He tries to watch his portions.  He does exercise every day, 5 days a week, about 45 minutes a day in the gym.    Patient also requests refill on nitroglycerin.  He has not used it for years, but he always has it readily available.    The following portions of the patient's history were reviewed and updated as appropriate: allergies, current medications, past family history, past medical history, past social history, past surgical history and problem list.    Objective  /74 (BP Location: Left arm, Patient Position: Sitting, Cuff Size: Large Adult)   Pulse 72 Comment: irregular  Temp 98 °F (36.7 °C) (Temporal)   Ht 177.8 cm (70\")   Wt 109 kg (240 lb)   BMI 34.44 kg/m²     Physical Exam  Vitals reviewed.   Constitutional:       Appearance: Normal " appearance.   HENT:      Head: Normocephalic and atraumatic.      Nose: No congestion.   Eyes:      Extraocular Movements: Extraocular movements intact.      Conjunctiva/sclera: Conjunctivae normal.   Cardiovascular:      Rate and Rhythm: Normal rate and regular rhythm.      Heart sounds: Normal heart sounds. No murmur heard.  Pulmonary:      Effort: Pulmonary effort is normal.      Breath sounds: Normal breath sounds.   Musculoskeletal:      Cervical back: Neck supple.      Right lower leg: No edema.      Left lower leg: No edema.   Skin:     General: Skin is warm and dry.   Neurological:      Mental Status: He is alert and oriented to person, place, and time. Mental status is at baseline.   Psychiatric:         Behavior: Behavior normal.         Thought Content: Thought content normal.         Assessment/Plan   1. Controlled type 2 diabetes mellitus without complication, without long-term current use of insulin ([facility])  Hemoglobin A1C   Date Value Ref Range Status   05/16/2022 6.6 % Final   01/10/2022 6.6 % Final   09/01/2021 6.99 (H) 4.80 - 5.60 % Final   04/15/2021 6.9 % Final   03/04/2021 7.34 (H) 4.80 - 5.60 % Final   08/28/2017 6.40 (H) 4.80 - 5.60 % Final   Currently is well controlled on glimepiride 1 mg.  Did not tolerate metformin due to GI side effects.  He also did not want to do the Januvia due to high co-pay.  Patient had appointment today with his eye doctor for routine eye exam, however due to his COVID exposure they rescheduled to August.  He will return for follow-up with me in August.  - POC Glycosylated Hemoglobin (Hb A1C)  - Comprehensive Metabolic Panel; Future    2. Essential hypertension  BP Readings from Last 3 Encounters:   05/16/22 120/74   01/10/22 134/80   11/10/21 128/82   Blood pressure currently at goal and well controlled.  Continue on current medications.    3. Close exposure to COVID-19 virus  Patient is asymptomatic and has been vaccinated and double boosted.  I recommend he  continue to quarantine for 7-10 days.  If he develops mild symptoms he can continue to monitor, if concern for more severe symptoms he should get in touch right away for potential medication.  He states his wife was prescribed medication.    4. Mixed hyperlipidemia  Patient is not fasting today, he will go to [facility] lab for blood draw.  - Lipid Panel; Future    5. History of coronary angioplasty with insertion of stent  No recurrence of symptoms.  Will prescribe nitroglycerin for backup, no current use.  - nitroglycerin (NITROSTAT) 0.4 MG SL tablet; Place 1 tablet under the tongue Every 5 (Five) Minutes As Needed for Chest Pain. Take no more than 3 doses in 15 minutes.  Dispense: 30 tablet; Refill: 1    6. Obesity, Class I, BMI 30-34.9  BMI is >= 30 and <= 34.9 (Class 1 obesity). The following options were offered after discussion: exercise counseling/recommendations and nutrition counseling/recommendations    Return in about 3 months (around 8/5/2022) for Recheck.    Future Appointments       Provider Department Center    5/27/2022 9:00 AM Markus Zhu MD [facility] CARDIOLOGY [place]    8/5/2022 11:00 AM Marko Lock MD [facility] INTERNAL MEDICINE [place]          Marko Lock MD  Family Medicine  05/16/2022    Answers for HPI/ROS submitted by the patient on 5/10/2022  What is the primary reason for your visit?: Other  Please describe your symptoms.: No  Have you had these symptoms before?: No  How long have you been having these symptoms?: 1-4 days  Please list any medications you are currently taking for this condition.: None  Please describe any probable cause for these symptoms. : Living

## 2022-05-20 ENCOUNTER — TELEMEDICINE (OUTPATIENT)
Dept: FAMILY MEDICINE CLINIC | Facility: TELEHEALTH | Age: 82
End: 2022-05-20

## 2022-05-20 DIAGNOSIS — U07.1 COVID-19: Primary | ICD-10-CM

## 2022-05-20 PROCEDURE — 99203 OFFICE O/P NEW LOW 30 MIN: CPT | Performed by: NURSE PRACTITIONER

## 2022-05-20 RX ORDER — BENZONATATE 200 MG/1
200 CAPSULE ORAL 3 TIMES DAILY PRN
Qty: 28 CAPSULE | Refills: 0 | Status: SHIPPED | OUTPATIENT
Start: 2022-05-20 | End: 2022-07-08 | Stop reason: ALTCHOICE

## 2022-05-20 RX ORDER — BEBTELOVIMAB 87.5 MG/ML
175 INJECTION, SOLUTION INTRAVENOUS ONCE
Status: CANCELLED | OUTPATIENT
Start: 2022-05-20

## 2022-05-20 NOTE — PROGRESS NOTES
Subjective   Chief Complaint   Patient presents with   • URI     COVID 19         Shady Newell is a 81 y.o. male.     Pt reports his wife tested positive for COVID 4 days ago. He states he began having chest congestion and slight cough yesterday. Today he tested positive for COIVD on a home test and is seeking treatment/quarantine advice. He has been vax plus 2nd booster. No prev hx of COVID infection.     URI   This is a new problem. The current episode started yesterday. The problem has been unchanged. There has been no fever. Associated symptoms include congestion (chest) and coughing. Pertinent negatives include no wheezing. He has tried nothing for the symptoms.        Allergies   Allergen Reactions   • Other      Dual antiplatelet therapy, spontaneous bruising as of June 2010       Past Medical History:   Diagnosis Date   • BPH (benign prostatic hyperplasia)    • CHF (congestive heart failure) ([facility]) ?   • Controlled type 2 diabetes mellitus without complication, without long-term current use of insulin ([facility]) 3/4/2021    2017 A1c 6.4 2021 A1c 7.34   • Dyslipidemia    • Elevated transaminase level    • Essential hypertension    • GERD (gastroesophageal reflux disease)    • History of coronary angioplasty with insertion of stent 2006    Stent 2006 and 2016   • Mixed hyperlipidemia    • Obesity    • Primary osteoarthritis involving multiple joints 7/6/2016   • Primary osteoarthritis of right knee    • Systolic murmur 3/4/2021    3/2021: Grade 2/6 R IC space       Past Surgical History:   Procedure Laterality Date   • CARDIAC CATHETERIZATION N/A 8/28/2017    Procedure: Left Heart Cath;  Surgeon: Nash Oliver MD;  Location: [facility] INVASIVE LOCATION;  Service:    • CATARACT EXTRACTION     • CORONARY ANGIOPLASTY WITH STENT PLACEMENT  2004   • HERNIA REPAIR  1988   • KNEE ARTHROSCOPY     • ROTATOR CUFF REPAIR Left 1990   • ROTATOR CUFF REPAIR Right 06/2015   • SHOULDER SURGERY     • VASECTOMY         Social  History     Socioeconomic History   • Marital status:    Tobacco Use   • Smoking status: Former Smoker     Packs/day: 1.00     Years: 10.00     Pack years: 10.00     Types: Cigarettes     Start date:      Quit date: 1969     Years since quittin.9   • Smokeless tobacco: Never Used   Vaping Use   • Vaping Use: Never used   Substance and Sexual Activity   • Alcohol use: Yes     Alcohol/week: 6.0 standard drinks     Types: 6 Shots of liquor per week     Comment: weekly   • Drug use: Never   • Sexual activity: Never       Family History   Problem Relation Age of Onset   • Heart disease Mother    • Osteoarthritis Mother    • Hypertension Mother    • Heart attack Mother          1991   • Arthritis Mother    • Hypertension Father    • Heart disease Father    • Stroke Father    • Cancer Father    • Hypertension Other    • Cancer Other    • Heart disease Brother         ATRIAL FIBRILLATION         Current Outpatient Medications:   •  aspirin 81 MG EC tablet, Take 1 tablet by mouth Daily., Disp: 1 tablet, Rfl: 0  •  atorvastatin (LIPITOR) 10 MG tablet, TAKE 1 TABLET BY MOUTH EVERY DAY AT NIGHT, Disp: 90 tablet, Rfl: 2  •  benzonatate (TESSALON) 200 MG capsule, Take 1 capsule by mouth 3 (Three) Times a Day As Needed for Cough., Disp: 28 capsule, Rfl: 0  •  carvedilol (COREG) 6.25 MG tablet, TAKE 1 TABLET BY MOUTH TWICE A DAY, Disp: 180 tablet, Rfl: 1  •  clopidogrel (PLAVIX) 75 MG tablet, TAKE 1 TABLET BY MOUTH EVERY DAY, Disp: 90 tablet, Rfl: 1  •  famotidine (PEPCID) 20 MG tablet, TAKE 1 TABLET BY MOUTH EVERYDAY AT BEDTIME, Disp: 90 tablet, Rfl: 3  •  finasteride (PROSCAR) 5 MG tablet, Take 5 mg by mouth daily., Disp: , Rfl:   •  glimepiride (AMARYL) 1 MG tablet, TAKE 1 TABLET BY MOUTH EVERY DAY BEFORE BREAKFAST, Disp: 90 tablet, Rfl: 1  •  glucose blood test strip, 1 each by Other route Daily As Needed (Blood sugar monitoring). Use as instructed, Disp: 100 each, Rfl: 12  •  glucose monitor  monitoring kit, 1 each Daily As Needed (Blood sugar monitoring)., Disp: 1 each, Rfl: 0  •  hydroCHLOROthiazide (HYDRODIURIL) 12.5 MG tablet, TAKE 1 TABLET BY MOUTH EVERY DAY, Disp: 90 tablet, Rfl: 3  •  loratadine (CLARITIN) 10 MG tablet, TAKE 1 TABLET BY MOUTH EVERY DAY AS NEEDED FOR ALLERGIES, Disp: 90 tablet, Rfl: 1  •  Multiple Vitamins-Minerals (MULTIVITAMIN ADULT PO), Take 1 tablet by mouth Daily., Disp: , Rfl:   •  nitroglycerin (NITROSTAT) 0.4 MG SL tablet, Place 1 tablet under the tongue Every 5 (Five) Minutes As Needed for Chest Pain. Take no more than 3 doses in 15 minutes., Disp: 30 tablet, Rfl: 1  •  ramipril (ALTACE) 10 MG capsule, TAKE 1 CAPSULE BY MOUTH TWICE A DAY, Disp: 180 capsule, Rfl: 1  •  tamsulosin (FLOMAX) 0.4 MG capsule 24 hr capsule, Take 1 capsule by mouth every night., Disp: , Rfl:       Review of Systems   Constitutional: Negative for activity change, appetite change, chills, diaphoresis, fatigue and fever.   HENT: Positive for congestion (chest).    Respiratory: Positive for cough. Negative for chest tightness, shortness of breath and wheezing.    Musculoskeletal: Negative for myalgias.   Neurological: Negative for headache.        There were no vitals filed for this visit.    Objective   Physical Exam  Constitutional:       General: He is not in acute distress.     Appearance: Normal appearance. He is not ill-appearing, toxic-appearing or diaphoretic.   HENT:      Head: Normocephalic.      Nose: Rhinorrhea present.      Comments: Per pt       Mouth/Throat:      Lips: Pink.      Mouth: Mucous membranes are moist.   Pulmonary:      Effort: Pulmonary effort is normal.   Neurological:      Mental Status: He is alert and oriented to person, place, and time.   Psychiatric:         Mood and Affect: Mood normal.         Behavior: Behavior normal.          Procedures     Assessment & Plan   Diagnoses and all orders for this visit:    1. COVID-19 (Primary)  -     Ambulatory Referral to [facility] For  Infusion Treatment  -     benzonatate (TESSALON) 200 MG capsule; Take 1 capsule by mouth 3 (Three) Times a Day As Needed for Cough.  Dispense: 28 capsule; Refill: 0    Continue to treat symptoms as you would with any cold or viral illness.   You may take plain Mucinex for chest congestion.  Tylenol for pain and/or fever, stay hydrated and rest.   Warning signs for COVID-19: trouble breathing, increasing shortness of breath, persistent chest pain or pressure, new confusion, inability to stay awake, pale, gray or blue-colored skin, lips or nail beds. If you show any of these signs seek Emergency Care.   If symptoms worsen or do not improve follow up with your PCP or visit your nearest Urgent Care Center or ER.      COVID QUARANTINE GUIDELINES:    You should Quarantine while you are waiting for your results.     Positive COVID result:  Isolate for 10 days from the date your symptoms began.  If symptoms fully resolve, you may end Isolation after 5 days from the onset of symptoms and wear a well-fitted mask for the additional 5 days.   If you can not wear a well-fitted mask AT ALL TIMES, you must remain in isolation for a full 10 days from the onset of symptoms.     If you have a Positive COVID result and NEVER had symptoms:  Isolate for 5 days from the date you had a positive test.   Wear a well-fitted mask for an additional 5 days.   If you can not wear a well fitted mask AT ALL TIMES, you must remain in isolation for the full 10 days from the onset of symptoms.       If you are NOT fully Vaccinated or had a Booster shot if eligible, but have had COVID EXPOSURE:  Quarantine for 10 days from the last day of exposure  Quarantine may be shortened if you have no symptoms and test Negative on day 5 post exposure.   Wear a well-fitted mask for 10 days from the last day of exposure.  If symptoms develop, stay home and get re-tested.     If HAVE been fully Vaccinated and had a Booster shot if eligible, but have had COVID  EXPOSURE and have No Symptoms:  You do NOT need to quarantine  Wear a well-fitted mask for 10 days from the last day of exposure.  Get a COVID test on day 5.  If symptoms develop, stay home and get re-tested.         PLAN: Pt is scheduled for MA infusion for 5/24/2022. Discussed dosing, side effects, recommended other symptomatic care.  Patient should follow up with primary care provider, Urgent Care or ER if symptoms worsen, fail to resolve or other symptoms need attention. Patient/family agree to the above.         SHUN Cuellar     The use of a video visit has been reviewed with the patient and verbal informd consent has een obtained. Myself and Shady Newell participated in this visit. The patient is located at [address] [place] [address]. I am located in [place], [place]. Mychart and Zoom were utilized.        This visit was performed via Telehealth.  This patient has been instructed to follow-up with their primary care provider if their symptoms worsen or the treatment provided does not resolve their illness.

## 2022-05-20 NOTE — PATIENT INSTRUCTIONS
Scheduling will call you to schedule Monoclonal Infusion.     Continue to treat symptoms as you would with any cold or viral illness.   You may take plain Mucinex for chest congestion.  Tylenol for pain and/or fever, stay hydrated and rest.   Warning signs for COVID-19: trouble breathing, increasing shortness of breath, persistent chest pain or pressure, new confusion, inability to stay awake, pale, gray or blue-colored skin, lips or nail beds. If you show any of these signs seek Emergency Care.   If symptoms worsen or do not improve follow up with your PCP or visit your nearest Urgent Care Center or ER.      COVID QUARANTINE GUIDELINES:    You should Quarantine while you are waiting for your results.     Positive COVID result:  Isolate for 10 days from the date your symptoms began.  If symptoms fully resolve, you may end Isolation after 5 days from the onset of symptoms and wear a well-fitted mask for the additional 5 days.   If you can not wear a well-fitted mask AT ALL TIMES, you must remain in isolation for a full 10 days from the onset of symptoms.     If you have a Positive COVID result and NEVER had symptoms:  Isolate for 5 days from the date you had a positive test.   Wear a well-fitted mask for an additional 5 days.   If you can not wear a well fitted mask AT ALL TIMES, you must remain in isolation for the full 10 days from the onset of symptoms.       If you are NOT fully Vaccinated or had a Booster shot if eligible, but have had COVID EXPOSURE:  Quarantine for 10 days from the last day of exposure  Quarantine may be shortened if you have no symptoms and test Negative on day 5 post exposure.   Wear a well-fitted mask for 10 days from the last day of exposure.  If symptoms develop, stay home and get re-tested.     If HAVE been fully Vaccinated and had a Booster shot if eligible, but have had COVID EXPOSURE and have No Symptoms:  You do NOT need to quarantine  Wear a well-fitted mask for 10 days from the  last day of exposure.  Get a COVID test on day 5.  If symptoms develop, stay home and get re-tested.

## 2022-05-23 RX ORDER — SODIUM CHLORIDE 9 MG/ML
30 INJECTION, SOLUTION INTRAVENOUS ONCE
Status: CANCELLED | OUTPATIENT
Start: 2022-05-24 | End: 2022-05-24

## 2022-05-23 RX ORDER — METHYLPREDNISOLONE SODIUM SUCCINATE 125 MG/2ML
125 INJECTION, POWDER, LYOPHILIZED, FOR SOLUTION INTRAMUSCULAR; INTRAVENOUS AS NEEDED
Status: CANCELLED | OUTPATIENT
Start: 2022-05-24

## 2022-05-23 RX ORDER — BEBTELOVIMAB 87.5 MG/ML
175 INJECTION, SOLUTION INTRAVENOUS ONCE
Status: CANCELLED | OUTPATIENT
Start: 2022-05-24

## 2022-05-23 RX ORDER — EPINEPHRINE 1 MG/ML
0.3 INJECTION, SOLUTION, CONCENTRATE INTRAVENOUS AS NEEDED
Status: CANCELLED | OUTPATIENT
Start: 2022-05-24

## 2022-05-23 RX ORDER — DIPHENHYDRAMINE HCL 50 MG
50 CAPSULE ORAL ONCE AS NEEDED
Status: CANCELLED | OUTPATIENT
Start: 2022-05-24

## 2022-05-23 RX ORDER — DIPHENHYDRAMINE HYDROCHLORIDE 50 MG/ML
50 INJECTION INTRAMUSCULAR; INTRAVENOUS ONCE AS NEEDED
Status: CANCELLED | OUTPATIENT
Start: 2022-05-24

## 2022-05-24 ENCOUNTER — HOSPITAL ENCOUNTER (OUTPATIENT)
Dept: INFUSION THERAPY | Facility: HOSPITAL | Age: 82
Discharge: HOME | End: 2022-05-24
Admitting: NURSE PRACTITIONER

## 2022-05-24 VITALS
TEMPERATURE: 98 F | HEART RATE: 71 BPM | OXYGEN SATURATION: 98 % | SYSTOLIC BLOOD PRESSURE: 134 MMHG | RESPIRATION RATE: 16 BRPM | DIASTOLIC BLOOD PRESSURE: 83 MMHG

## 2022-05-24 DIAGNOSIS — U07.1 COVID-19: Primary | ICD-10-CM

## 2022-05-24 PROCEDURE — M0222 HC INJECTION BEBTELOVIMAB: HCPCS | Performed by: NURSE PRACTITIONER

## 2022-05-24 PROCEDURE — 96374 THER/PROPH/DIAG INJ IV PUSH: CPT

## 2022-05-24 PROCEDURE — 25010000002 INJECTION, BEBTELOVIMAB, 175 MG: Performed by: NURSE PRACTITIONER

## 2022-05-24 RX ORDER — BEBTELOVIMAB 87.5 MG/ML
175 INJECTION, SOLUTION INTRAVENOUS ONCE
Status: COMPLETED | OUTPATIENT
Start: 2022-05-24 | End: 2022-05-24

## 2022-05-24 RX ORDER — METHYLPREDNISOLONE SODIUM SUCCINATE 125 MG/2ML
125 INJECTION, POWDER, LYOPHILIZED, FOR SOLUTION INTRAMUSCULAR; INTRAVENOUS AS NEEDED
Status: DISCONTINUED | OUTPATIENT
Start: 2022-05-24 | End: 2022-05-26 | Stop reason: HOSPADM

## 2022-05-24 RX ORDER — DIPHENHYDRAMINE HYDROCHLORIDE 50 MG/ML
50 INJECTION INTRAMUSCULAR; INTRAVENOUS ONCE AS NEEDED
OUTPATIENT
Start: 2022-05-24

## 2022-05-24 RX ORDER — DIPHENHYDRAMINE HCL 25 MG
50 CAPSULE ORAL ONCE AS NEEDED
Status: DISCONTINUED | OUTPATIENT
Start: 2022-05-24 | End: 2022-05-26 | Stop reason: HOSPADM

## 2022-05-24 RX ORDER — DIPHENHYDRAMINE HYDROCHLORIDE 50 MG/ML
50 INJECTION INTRAMUSCULAR; INTRAVENOUS ONCE AS NEEDED
Status: DISCONTINUED | OUTPATIENT
Start: 2022-05-24 | End: 2022-05-26 | Stop reason: HOSPADM

## 2022-05-24 RX ORDER — SODIUM CHLORIDE 9 MG/ML
30 INJECTION, SOLUTION INTRAVENOUS ONCE
Status: DISCONTINUED | OUTPATIENT
Start: 2022-05-24 | End: 2022-05-26 | Stop reason: HOSPADM

## 2022-05-24 RX ORDER — METHYLPREDNISOLONE SODIUM SUCCINATE 125 MG/2ML
125 INJECTION, POWDER, LYOPHILIZED, FOR SOLUTION INTRAMUSCULAR; INTRAVENOUS AS NEEDED
OUTPATIENT
Start: 2022-05-24

## 2022-05-24 RX ORDER — DIPHENHYDRAMINE HCL 25 MG
50 CAPSULE ORAL ONCE AS NEEDED
OUTPATIENT
Start: 2022-05-24

## 2022-05-24 RX ORDER — SODIUM CHLORIDE 9 MG/ML
30 INJECTION, SOLUTION INTRAVENOUS ONCE
Status: CANCELLED | OUTPATIENT
Start: 2022-05-24 | End: 2022-05-24

## 2022-05-24 RX ORDER — BEBTELOVIMAB 87.5 MG/ML
175 INJECTION, SOLUTION INTRAVENOUS ONCE
Status: CANCELLED | OUTPATIENT
Start: 2022-05-24

## 2022-05-24 RX ADMIN — BEBTELOVIMAB 175 MG: 87.5 INJECTION, SOLUTION INTRAVENOUS at 08:06

## 2022-07-07 NOTE — PROGRESS NOTES
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 current resident of [facility].    PROBLEM LIST:   1. Coronary artery disease:  a. January 2005: Stress Cardiolite Samuel Mathias with fixed inferior defect, diaphragmatic attenuation could not be ruled out, 11 minutes 43 seconds via Thom protocol. EF 63%.   b. August 2006: Left heart catheterization with PCI and stenting, 99% LAD stenosis, 3.0x28 mm CYPHER drug-eluting stent post-dilated to 3.5 mm by Bebeto Zhu and Shi, nonobstructive disease otherwise, mild flow limitation of jailed diagonal.  c. 8/28/17 [facility] AA: 80% stenosis of the mid to distal RCA treated with 2 overlapping MARIAA, EF 60%  d. 6/21 Chelo PET: Chelo PET scan WNL.  Rest EF = 60%.  Stress EF = 56%.  No significant ST or T wave abnormalities noted.  2. Dyslipidemia:  a. 11/18 114/98/41/53 doing well he is exercising multiple times a week for about 45 minutes he recently  b. 3/21 115/82/41/58  3. Diabetes Meillitus   1. 5/22 6.6  4. Shortness of breath:  a. PFTs within normal limits, 2007.   5. Hypertension, presumed essential.  6. Prostatism, recent implementation of alpha blockade.  7. Gastroesophageal reflux disease.  8. Mildly elevated transaminases.  9. 5/22 Covid positive - received infusion but not hospitalized  10. Surgical history:  a. Left rotator cuff repair 1990.  b. Herniorrhaphy 1988.   c. Arthroscopic knee surgery.  d. Right rotator cuff repair, June 2015.      CC:   Chief Complaint   Patient presents with   • Chest Pain     Angina pectoris        Allergies  Allergies   Allergen Reactions   • Other      Dual antiplatelet therapy, spontaneous bruising as of June 2010        Current Medications    Current Outpatient Medications:   •  aspirin 81 MG EC tablet, Take 1 tablet by mouth Daily., Disp: 1 tablet, Rfl: 0  •  atorvastatin (LIPITOR) 10 MG tablet, TAKE 1 TABLET BY MOUTH EVERY DAY AT NIGHT, Disp: 90 tablet, Rfl: 2  •  carvedilol (COREG) 6.25 MG tablet, TAKE 1 TABLET BY MOUTH TWICE A DAY, Disp: 180 tablet, Rfl: 1  •  clopidogrel (PLAVIX) 75 MG tablet, TAKE 1 TABLET BY MOUTH EVERY DAY, Disp: 90 tablet, Rfl: 1  •  famotidine (PEPCID) 20 MG tablet, TAKE 1 TABLET BY MOUTH EVERYDAY AT BEDTIME, Disp: 90 tablet, Rfl: 3  •  finasteride (PROSCAR) 5 MG tablet, Take 5 mg by mouth daily., Disp: , Rfl:   •  glimepiride (AMARYL) 1 MG tablet, TAKE 1 TABLET BY MOUTH EVERY DAY BEFORE BREAKFAST, Disp: 90 tablet, Rfl: 1  •  glucose blood test strip, 1 each by Other route Daily As Needed (Blood sugar monitoring). Use as instructed, Disp: 100 each, Rfl: 12  •  glucose monitor monitoring kit, 1 each Daily As Needed (Blood sugar monitoring)., Disp: 1 each, Rfl: 0  •  hydroCHLOROthiazide (HYDRODIURIL) 12.5 MG tablet, TAKE 1 TABLET BY MOUTH EVERY DAY, Disp: 90 tablet, Rfl: 3  •  loratadine (CLARITIN) 10 MG tablet, TAKE 1 TABLET BY MOUTH EVERY DAY AS NEEDED FOR ALLERGIES, Disp: 90 tablet, Rfl: 1  •  Multiple Vitamins-Minerals (MULTIVITAMIN ADULT PO), Take 1 tablet by mouth Daily., Disp: , Rfl:   •  nitroglycerin (NITROSTAT) 0.4 MG SL tablet, Place 1 tablet under the tongue Every 5 (Five) Minutes As Needed for Chest Pain. Take no more than 3 doses in 15 minutes., Disp: 30 tablet, Rfl: 1  •  ramipril (ALTACE) 10 MG capsule, TAKE 1 CAPSULE BY MOUTH TWICE A DAY, Disp: 180 capsule, Rfl: 1  •  tamsulosin (FLOMAX) 0.4 MG capsule 24 hr capsule, Take 1 capsule by mouth every night., Disp: , Rfl:   •  benzonatate (TESSALON) 200 MG capsule, Take 1 capsule by mouth 3 (Three) Times a Day As Needed for Cough., Disp: 28 capsule, Rfl: 0      History of Present Illness   Shady Newell is a 81 y.o. year old male  "here for follow up.    History today reveals that the patient is doing well from a cardiovascular standpoint.  The patient reports that he is regularly participating in aerobic exercise for 30 to 45 minutes multiple times throughout the week without development of any anginal/atypical anginal symptomatology.      Of significance, the patient reports that he recently moved from [facility] to the [facility] in [facility] off of [facility].  The patient states that since moving to that location and living in the assisted living establishment, he has gained significant amount of weight.  The patient reports that the food provided by the [facility] facility is very high in carbohydrates and fats.    He states that he is very aware of his weight gain and is making active strides to improve his dietary choices.    Pt denies any chest pain, dyspnea, dyspnea on exertion, orthopnea, PND, palpitations, lower extremity edema, or claudication.      OBJECTIVE:  Vitals:    07/08/22 1300   BP: 134/78   BP Location: Right arm   Patient Position: Sitting   Pulse: 79   SpO2: 96%   Weight: 109 kg (240 lb)   Height: 177.8 cm (70\")     Body mass index is 34.44 kg/m².    Vitals and nursing note reviewed.   Constitutional:       General: Awake. Not in acute distress.     Appearance: Healthy appearance. Well-developed and not in distress. Obese.   Eyes:      General: No scleral icterus.     Conjunctiva/sclera: Conjunctivae normal.      Pupils: Pupils are equal, round, and reactive to light.   HENT:      Head: Normocephalic and atraumatic.      Nose: Nose normal.    Mouth/Throat:      Pharynx: Uvula midline.   Neck:      Vascular: No carotid bruit or JVD.      Trachea: No tracheal deviation.   Pulmonary:      Effort: Pulmonary effort is normal.      Breath sounds: Normal breath sounds. No wheezing. No rhonchi. No rales.   Cardiovascular:      Normal rate. Regular rhythm. Normal S1. Normal S2.      Murmurs: There is " a grade 2/6 systolic murmur.      No gallop. No click. No rub.   Pulses:     Intact distal pulses.   Edema:     Peripheral edema absent.   Abdominal:      General: Abdomen is protuberant. Bowel sounds are normal.      Palpations: Abdomen is soft.      Tenderness: There is no guarding.   Musculoskeletal:         General: No tenderness.      Extremities: No clubbing present.     Cervical back: Normal range of motion and neck supple. Skin:     General: Skin is warm and dry.      Findings: No erythema.   Neurological:      Mental Status: Alert, oriented to person, place, and time and oriented to person, place and time.   Psychiatric:         Attention and Perception: Attention normal.         Mood and Affect: Mood normal.         Speech: Speech normal.         Behavior: Behavior normal. Behavior is cooperative.         Diagnostic Data:  Procedures      ASSESSMENT:   Diagnosis Plan   1. Coronary artery disease involving native coronary artery of native heart without angina pectoris     2. Dyslipidemia     3. Controlled type 2 diabetes mellitus without complication, without long-term current use of insulin ([facility])     4. Obesity, Class I, BMI 30.0-34.9 (see actual BMI)     5. Essential hypertension     6. Easy bruising         PLAN:  1.  CAD-patient reports that he is regularly exercising up to 45 minutes multiple times a week without the development of any new or worsening anginal/atypical anginal symptom.  Will defer ischemic evaluation at this time but would have low threshold for ischemic evaluation if new or worsening anginal/atypical anginal symptom should occur.  2.  Dyslipidemia-patient's most recent lipid panel in 3/21 is at goal of LDL <70.  Patient will need updated lipid panel upon PCP follow-up.  Continue current medical regimen  3.  Type 2 diabetes mellitus-patient's most recent A1c in 5/22 at goal of A1c <7.0.  Continue current medical regimen  4.  Obesity- patient is up 13 pounds since last cardiac  evaluation.  Patient reports that he is well aware of his weight gain and is making active strides to continue/initiate dietary/exercise lifestyle modifications.  5.  Hypertension-patient's in clinic blood pressure appreciated 134/78.  Continue current medical regimen.  6.  Easy bruising-is acceptable at this time for patient to discontinue Plavix therapy.  Patient will need to continue aspirin 81 mg daily due to history of CAD.    Scribe: Nato Monzon PA-C for Dr. Markus Zhu M.D. [facility]    Markus Zhu MD, FACC

## 2022-07-08 ENCOUNTER — OFFICE VISIT (OUTPATIENT)
Dept: CARDIOLOGY | Facility: CLINIC | Age: 82
End: 2022-07-08

## 2022-07-08 VITALS
WEIGHT: 240 LBS | HEIGHT: 70 IN | HEART RATE: 79 BPM | DIASTOLIC BLOOD PRESSURE: 78 MMHG | OXYGEN SATURATION: 96 % | SYSTOLIC BLOOD PRESSURE: 134 MMHG | BODY MASS INDEX: 34.36 KG/M2

## 2022-07-08 DIAGNOSIS — E11.9 CONTROLLED TYPE 2 DIABETES MELLITUS WITHOUT COMPLICATION, WITHOUT LONG-TERM CURRENT USE OF INSULIN: ICD-10-CM

## 2022-07-08 DIAGNOSIS — E78.5 DYSLIPIDEMIA: ICD-10-CM

## 2022-07-08 DIAGNOSIS — E66.9 OBESITY, CLASS I, BMI 30.0-34.9 (SEE ACTUAL BMI): ICD-10-CM

## 2022-07-08 DIAGNOSIS — R23.3 EASY BRUISING: ICD-10-CM

## 2022-07-08 DIAGNOSIS — I25.10 CORONARY ARTERY DISEASE INVOLVING NATIVE CORONARY ARTERY OF NATIVE HEART WITHOUT ANGINA PECTORIS: Primary | ICD-10-CM

## 2022-07-08 DIAGNOSIS — I10 ESSENTIAL HYPERTENSION: ICD-10-CM

## 2022-07-08 PROCEDURE — 99214 OFFICE O/P EST MOD 30 MIN: CPT | Performed by: INTERNAL MEDICINE

## 2022-07-11 ENCOUNTER — OFFICE VISIT (OUTPATIENT)
Dept: INTERNAL MEDICINE | Facility: CLINIC | Age: 82
End: 2022-07-11

## 2022-07-11 VITALS
DIASTOLIC BLOOD PRESSURE: 78 MMHG | HEIGHT: 70 IN | SYSTOLIC BLOOD PRESSURE: 126 MMHG | WEIGHT: 242.6 LBS | HEART RATE: 76 BPM | TEMPERATURE: 98.2 F | BODY MASS INDEX: 34.73 KG/M2

## 2022-07-11 DIAGNOSIS — E11.9 CONTROLLED TYPE 2 DIABETES MELLITUS WITHOUT COMPLICATION, WITHOUT LONG-TERM CURRENT USE OF INSULIN: ICD-10-CM

## 2022-07-11 DIAGNOSIS — L08.9 SKIN PUSTULE: Primary | ICD-10-CM

## 2022-07-11 PROCEDURE — 99213 OFFICE O/P EST LOW 20 MIN: CPT | Performed by: STUDENT IN AN ORGANIZED HEALTH CARE EDUCATION/TRAINING PROGRAM

## 2022-07-11 RX ORDER — CLOPIDOGREL BISULFATE 75 MG/1
1 TABLET ORAL DAILY
COMMUNITY
Start: 2022-07-08 | End: 2022-08-05 | Stop reason: ALTCHOICE

## 2022-07-11 NOTE — PROGRESS NOTES
"Chief Complaint  Shady Newell is a 81 y.o. male presenting for place on eyelid (Right eye ).     . Wife is health care proxy. Have 2 sons (born -66 and -73) - [place] in [facility] [place], the other in [place], [place]. Worked in bank in the past, later on building management. Moved from [place] to [facility] home in [facility] 2021.     Patient has a past medical history of coronary heart disease without MI and was stented in 2006 and 2016, CHF, T2DM (3/2021), GERD, dyslipidemia, osteoarthritis of multiple joints, osteoarthritis of multiple joints, hypertension w/ \"whitecoat\" component, BPH, obesity and elevated transaminase levels.    History of Present Illness  Patient is here for follow-up due to elevation of his right upper eyelid.  He was seen Wednesday, 7/6/2022 by nurse practitioner near his home due to a few days of swollen and mildly red lesion with a de oliveira over the medial part of his right upper eyelid.  He was prescribed eyedrops, he does not remember the name.  He was able to express some yellowish discharge, and eventually his symptoms subsided over the last few days.  His wife wanted him to come in for a check to evaluate.  He was not able to get in to see his ophthalmologist, but he has appointment with them in early August for routine checkup.  He states they thought it may be could be herpes.    The following portions of the patient's history were reviewed and updated as appropriate: allergies, current medications, past family history, past medical history, past social history, past surgical history and problem list.          Objective  /78 (BP Location: Left arm, Patient Position: Sitting, Cuff Size: Adult)   Pulse 76   Temp 98.2 °F (36.8 °C) (Temporal)   Ht 177.8 cm (70\")   Wt 110 kg (242 lb 9.6 oz)   BMI 34.81 kg/m²     Physical Exam  Constitutional:       General: He is not in acute distress.     Appearance: Normal appearance. He is not ill-appearing.   Eyes:      " General:         Right eye: No discharge.         Left eye: No discharge.      Extraocular Movements: Extraocular movements intact.      Conjunctiva/sclera: Conjunctivae normal.      Comments: See image.  Thin scab covering mild swelling with minimal redness.   Musculoskeletal:      Cervical back: Neck supple.   Neurological:      Mental Status: He is oriented to person, place, and time. Mental status is at baseline.      Gait: Gait normal.   Psychiatric:         Behavior: Behavior normal.         Thought Content: Thought content normal.         Assessment/Plan   1. Skin pustule  I do not think this would have been herpes.  He can stop using his eyedrops.  This seems to be resolving, he might have been a small possibility that now has resolved.    2. Controlled type 2 diabetes mellitus without complication, without long-term current use of insulin (HCC)  We will do A1c with his blood work fasting before next visit.  - Hemoglobin A1c; Future      Return if symptoms worsen or fail to improve, for Next scheduled follow up.    Future Appointments       Provider Department Center    8/5/2022 11:00 AM Marko Lock MD [facility] INTERNAL MEDICINE [place]    7/21/2023 1:30 PM Markus Zhu MD [facility] CARDIOLOGY [place]            Marko Lock MD  Family Medicine  07/11/2022    Answers for HPI/ROS submitted by the patient on 7/7/2022  What is the primary reason for your visit?: Rash  Onset: 1 to 4 weeks ago  Progression since onset: gradually improving  Affected locations: face  Characteristics: redness, itchiness  Exposed to: unknown  eye pain: Yes  rhinorrhea: Yes

## 2022-07-26 ENCOUNTER — LAB (OUTPATIENT)
Dept: LAB | Facility: HOSPITAL | Age: 82
End: 2022-07-26

## 2022-07-26 DIAGNOSIS — E11.9 CONTROLLED TYPE 2 DIABETES MELLITUS WITHOUT COMPLICATION, WITHOUT LONG-TERM CURRENT USE OF INSULIN: ICD-10-CM

## 2022-07-26 DIAGNOSIS — E78.2 MIXED HYPERLIPIDEMIA: Chronic | ICD-10-CM

## 2022-07-26 LAB
ALBUMIN SERPL-MCNC: 4.2 G/DL (ref 3.5–5.2)
ALBUMIN/GLOB SERPL: 2 G/DL
ALP SERPL-CCNC: 85 U/L (ref 39–117)
ALT SERPL W P-5'-P-CCNC: 60 U/L (ref 1–41)
ANION GAP SERPL CALCULATED.3IONS-SCNC: 11 MMOL/L (ref 5–15)
AST SERPL-CCNC: 39 U/L (ref 1–40)
BILIRUB SERPL-MCNC: 0.6 MG/DL (ref 0–1.2)
BUN SERPL-MCNC: 12 MG/DL (ref 8–23)
BUN/CREAT SERPL: 12.5 (ref 7–25)
CALCIUM SPEC-SCNC: 9.2 MG/DL (ref 8.6–10.5)
CHLORIDE SERPL-SCNC: 104 MMOL/L (ref 98–107)
CHOLEST SERPL-MCNC: 112 MG/DL (ref 0–200)
CO2 SERPL-SCNC: 25 MMOL/L (ref 22–29)
CREAT SERPL-MCNC: 0.96 MG/DL (ref 0.76–1.27)
EGFRCR SERPLBLD CKD-EPI 2021: 78.9 ML/MIN/1.73
GLOBULIN UR ELPH-MCNC: 2.1 GM/DL
GLUCOSE SERPL-MCNC: 150 MG/DL (ref 65–99)
HBA1C MFR BLD: 6.7 % (ref 4.8–5.6)
HDLC SERPL-MCNC: 42 MG/DL (ref 40–60)
LDLC SERPL CALC-MCNC: 50 MG/DL (ref 0–100)
LDLC/HDLC SERPL: 1.16 {RATIO}
POTASSIUM SERPL-SCNC: 4.6 MMOL/L (ref 3.5–5.2)
PROT SERPL-MCNC: 6.3 G/DL (ref 6–8.5)
SODIUM SERPL-SCNC: 140 MMOL/L (ref 136–145)
TRIGL SERPL-MCNC: 107 MG/DL (ref 0–150)
VLDLC SERPL-MCNC: 20 MG/DL (ref 5–40)

## 2022-07-26 PROCEDURE — 80061 LIPID PANEL: CPT

## 2022-07-26 PROCEDURE — 83036 HEMOGLOBIN GLYCOSYLATED A1C: CPT

## 2022-07-26 PROCEDURE — 80053 COMPREHEN METABOLIC PANEL: CPT

## 2022-08-05 ENCOUNTER — OFFICE VISIT (OUTPATIENT)
Dept: INTERNAL MEDICINE | Facility: CLINIC | Age: 82
End: 2022-08-05

## 2022-08-05 VITALS
HEIGHT: 70 IN | TEMPERATURE: 97.8 F | WEIGHT: 241.4 LBS | DIASTOLIC BLOOD PRESSURE: 70 MMHG | HEART RATE: 88 BPM | SYSTOLIC BLOOD PRESSURE: 136 MMHG | BODY MASS INDEX: 34.56 KG/M2

## 2022-08-05 DIAGNOSIS — E11.9 CONTROLLED TYPE 2 DIABETES MELLITUS WITHOUT COMPLICATION, WITHOUT LONG-TERM CURRENT USE OF INSULIN: Primary | Chronic | ICD-10-CM

## 2022-08-05 DIAGNOSIS — I10 ESSENTIAL HYPERTENSION: Chronic | ICD-10-CM

## 2022-08-05 DIAGNOSIS — I25.10 CORONARY ARTERY DISEASE INVOLVING NATIVE CORONARY ARTERY OF NATIVE HEART WITHOUT ANGINA PECTORIS: Chronic | ICD-10-CM

## 2022-08-05 DIAGNOSIS — E66.9 OBESITY, CLASS I, BMI 30-34.9: ICD-10-CM

## 2022-08-05 PROBLEM — Z86.16 HISTORY OF COVID-19: Status: ACTIVE | Noted: 2022-05-20

## 2022-08-05 PROCEDURE — 99214 OFFICE O/P EST MOD 30 MIN: CPT | Performed by: STUDENT IN AN ORGANIZED HEALTH CARE EDUCATION/TRAINING PROGRAM

## 2022-08-05 NOTE — PROGRESS NOTES
"Chief Complaint  Shady Newell is a 82 y.o. male presenting for Diabetes (F/u ).     . Wife is health care proxy. Have 2 sons (born -66 and -73) - one in [facility] [place], the other in [place], [place]. Worked in bank in the past, later on building management. Moved from [place] to skilled nursing home in [facility] 2021.     Patient has a past medical history of coronary heart disease without MI and was stented in 2006 and 2016, CHF, T2DM (3/2021), GERD, dyslipidemia, osteoarthritis of multiple joints, osteoarthritis of multiple joints, hypertension w/ \"whitecoat\" component, BPH, obesity and elevated transaminase levels.       History of Present Illness  Patient is here for follow-up.    He is overall doing well, continues to exercise cardio about 45 minutes every day.  He follows with cardiology Dr. Zhu and they took him off clopidogrel.    Patient went to see his eye doctor today, they had no concerns.  The irritation of his eye has resolved.    The following portions of the patient's history were reviewed and updated as appropriate: allergies, current medications, past family history, past medical history, past social history, past surgical history and problem list.    Objective  /70 (BP Location: Left arm, Patient Position: Sitting, Cuff Size: Adult)   Pulse 88   Temp 97.8 °F (36.6 °C) (Temporal)   Ht 177.8 cm (70\")   Wt 109 kg (241 lb 6.4 oz)   BMI 34.64 kg/m²     Physical Exam  Vitals reviewed.   Constitutional:       Appearance: Normal appearance.   HENT:      Head: Normocephalic and atraumatic.      Nose: No congestion.   Eyes:      Extraocular Movements: Extraocular movements intact.      Conjunctiva/sclera: Conjunctivae normal.   Cardiovascular:      Rate and Rhythm: Normal rate and regular rhythm.      Heart sounds: Normal heart sounds. No murmur heard.  Pulmonary:      Effort: Pulmonary effort is normal.      Breath sounds: Normal breath sounds.   Musculoskeletal:      Cervical " back: Neck supple.      Right lower leg: No edema.      Left lower leg: No edema.   Skin:     General: Skin is warm and dry.   Neurological:      Mental Status: He is alert and oriented to person, place, and time. Mental status is at baseline.   Psychiatric:         Behavior: Behavior normal.         Thought Content: Thought content normal.         Assessment/Plan   1. Controlled type 2 diabetes mellitus without complication, without long-term current use of insulin (HCC)  Hemoglobin A1C   Date Value Ref Range Status   07/26/2022 6.70 (H) 4.80 - 5.60 % Final   05/16/2022 6.6 % Final   01/10/2022 6.6 % Final   09/01/2021 6.99 (H) 4.80 - 5.60 % Final   04/15/2021 6.9 % Final   03/04/2021 7.34 (H) 4.80 - 5.60 % Final   Good glycemic control.  Continue on current medication.  I requested eye doctor exam for this diabetes to be sent s.    2. Coronary artery disease involving native coronary artery of native heart without angina pectoris  Stable and doing well.  Continue on aspirin, clopidogrel discontinued per cardiology.  Continue follow-up with cardiology.    3. Essential hypertension  BP Readings from Last 3 Encounters:   08/05/22 136/70   07/11/22 126/78   07/08/22 134/78   Blood pressure at goal.  Continue current medications.    4. Obesity, Class I, BMI 30-34.9  BMI is >= 30 and <35. (Class 1 Obesity). The following options were offered after discussion;: exercise counseling/recommendations and nutrition counseling/recommendations    Return in about 3 months (around 11/5/2022) for Medicare Wellness.    Future Appointments       Provider Department Center    11/15/2022 10:45 AM Marko Lock MD [facility] INTERNAL MEDICINE [place]    7/21/2023 1:30 PM Markus Zhu MD [facility] CARDIOLOGY [place]          Marko Lock MD  Family Medicine  08/05/2022    Answers for HPI/ROS submitted by the patient on 7/29/2022  What is the primary reason for your visit?: Other  Please describe  your symptoms.: Semi-annual checkup  Have you had these symptoms before?: No  How long have you been having these symptoms?: Greater than 2 weeks  Please list any medications you are currently taking for this condition.: none  Please describe any probable cause for these symptoms. : living

## 2022-09-18 DIAGNOSIS — E11.9 CONTROLLED TYPE 2 DIABETES MELLITUS WITHOUT COMPLICATION, WITHOUT LONG-TERM CURRENT USE OF INSULIN: ICD-10-CM

## 2022-09-18 DIAGNOSIS — E78.5 DYSLIPIDEMIA: ICD-10-CM

## 2022-09-18 DIAGNOSIS — I10 ESSENTIAL HYPERTENSION: ICD-10-CM

## 2022-09-19 RX ORDER — GLIMEPIRIDE 1 MG/1
TABLET ORAL
Qty: 90 TABLET | Refills: 1 | Status: SHIPPED | OUTPATIENT
Start: 2022-09-19 | End: 2023-03-20

## 2022-09-19 RX ORDER — CARVEDILOL 6.25 MG/1
TABLET ORAL
Qty: 180 TABLET | Refills: 1 | Status: SHIPPED | OUTPATIENT
Start: 2022-09-19 | End: 2023-03-20

## 2022-09-19 RX ORDER — ATORVASTATIN CALCIUM 10 MG/1
TABLET, FILM COATED ORAL
Qty: 90 TABLET | Refills: 3 | Status: SHIPPED | OUTPATIENT
Start: 2022-09-19

## 2022-09-19 RX ORDER — RAMIPRIL 10 MG/1
CAPSULE ORAL
Qty: 180 CAPSULE | Refills: 1 | Status: SHIPPED | OUTPATIENT
Start: 2022-09-19 | End: 2023-03-20

## 2022-09-19 NOTE — TELEPHONE ENCOUNTER
Rx Refill Note  Requested Prescriptions     Pending Prescriptions Disp Refills   • carvedilol (COREG) 6.25 MG tablet [Pharmacy Med Name: CARVEDILOL 6.25 MG TABLET] 180 tablet 1     Sig: TAKE 1 TABLET BY MOUTH TWICE A DAY   • glimepiride (AMARYL) 1 MG tablet [Pharmacy Med Name: GLIMEPIRIDE 1 MG TABLET] 90 tablet 1     Sig: TAKE 1 TABLET BY MOUTH EVERY DAY BEFORE BREAKFAST   • ramipril (ALTACE) 10 MG capsule [Pharmacy Med Name: RAMIPRIL 10 MG CAPSULE] 180 capsule 1     Sig: TAKE 1 CAPSULE BY MOUTH TWICE A DAY      Last office visit with prescribing clinician: 8/5/2022      Next office visit with prescribing clinician: 11/15/2022            Adilia Amaya RN  09/19/22, 11:19 EDT

## 2022-10-21 ENCOUNTER — PATIENT MESSAGE (OUTPATIENT)
Dept: INTERNAL MEDICINE | Facility: CLINIC | Age: 82
End: 2022-10-21

## 2022-10-21 NOTE — TELEPHONE ENCOUNTER
From: Shady Newell  To: Marko Lock MD  Sent: 10/21/2022 11:49 AM EDT  Subject: VACCINES RECEIVED    I DON'T KNOW HOW TO INPUT TO MY RECORD, BUT I HAVE RECEIVED THE FOLLOWING VACCINES.    10/21/22. TDAP  10/21/22 PFIZER-SocialFlow COVID-19   10/03/22 PNEUMONIA (PREVNAR 20)  10/03/22. FLUZONE HIGH-DOSE QUAD 2022-23    ALL WERE ADMINISTERED AT [facility] IN [facility] ON [facility] IN [place].

## 2022-11-09 ENCOUNTER — PATIENT MESSAGE (OUTPATIENT)
Dept: INTERNAL MEDICINE | Facility: CLINIC | Age: 82
End: 2022-11-09

## 2022-11-09 DIAGNOSIS — E11.9 CONTROLLED TYPE 2 DIABETES MELLITUS WITHOUT COMPLICATION, WITHOUT LONG-TERM CURRENT USE OF INSULIN: Primary | Chronic | ICD-10-CM

## 2022-11-10 ENCOUNTER — LAB (OUTPATIENT)
Dept: LAB | Facility: HOSPITAL | Age: 82
End: 2022-11-10

## 2022-11-10 DIAGNOSIS — E11.9 CONTROLLED TYPE 2 DIABETES MELLITUS WITHOUT COMPLICATION, WITHOUT LONG-TERM CURRENT USE OF INSULIN: Chronic | ICD-10-CM

## 2022-11-10 LAB
ALBUMIN SERPL-MCNC: 4.1 G/DL (ref 3.5–5.2)
ALBUMIN UR-MCNC: <1.2 MG/DL
ALBUMIN/GLOB SERPL: 1.5 G/DL
ALP SERPL-CCNC: 80 U/L (ref 39–117)
ALT SERPL W P-5'-P-CCNC: 59 U/L (ref 1–41)
ANION GAP SERPL CALCULATED.3IONS-SCNC: 13.4 MMOL/L (ref 5–15)
AST SERPL-CCNC: 47 U/L (ref 1–40)
BILIRUB SERPL-MCNC: 0.8 MG/DL (ref 0–1.2)
BUN SERPL-MCNC: 8 MG/DL (ref 8–23)
BUN/CREAT SERPL: 8.2 (ref 7–25)
CALCIUM SPEC-SCNC: 9.7 MG/DL (ref 8.6–10.5)
CHLORIDE SERPL-SCNC: 102 MMOL/L (ref 98–107)
CO2 SERPL-SCNC: 26.6 MMOL/L (ref 22–29)
CREAT SERPL-MCNC: 0.98 MG/DL (ref 0.76–1.27)
EGFRCR SERPLBLD CKD-EPI 2021: 77 ML/MIN/1.73
GLOBULIN UR ELPH-MCNC: 2.7 GM/DL
GLUCOSE SERPL-MCNC: 134 MG/DL (ref 65–99)
HBA1C MFR BLD: 6.9 % (ref 4.8–5.6)
POTASSIUM SERPL-SCNC: 4.6 MMOL/L (ref 3.5–5.2)
PROT SERPL-MCNC: 6.8 G/DL (ref 6–8.5)
SODIUM SERPL-SCNC: 142 MMOL/L (ref 136–145)

## 2022-11-10 PROCEDURE — 82043 UR ALBUMIN QUANTITATIVE: CPT

## 2022-11-10 PROCEDURE — 83036 HEMOGLOBIN GLYCOSYLATED A1C: CPT

## 2022-11-10 PROCEDURE — 80053 COMPREHEN METABOLIC PANEL: CPT

## 2022-11-15 ENCOUNTER — OFFICE VISIT (OUTPATIENT)
Dept: INTERNAL MEDICINE | Facility: CLINIC | Age: 82
End: 2022-11-15

## 2022-11-15 VITALS
WEIGHT: 243.4 LBS | TEMPERATURE: 98.4 F | BODY MASS INDEX: 36.05 KG/M2 | HEIGHT: 69 IN | SYSTOLIC BLOOD PRESSURE: 124 MMHG | DIASTOLIC BLOOD PRESSURE: 80 MMHG | HEART RATE: 64 BPM

## 2022-11-15 DIAGNOSIS — Z00.00 ENCOUNTER FOR SUBSEQUENT ANNUAL WELLNESS VISIT (AWV) IN MEDICARE PATIENT: Primary | ICD-10-CM

## 2022-11-15 DIAGNOSIS — I10 ESSENTIAL HYPERTENSION: Chronic | ICD-10-CM

## 2022-11-15 DIAGNOSIS — I25.10 CORONARY ARTERY DISEASE INVOLVING NATIVE CORONARY ARTERY OF NATIVE HEART WITHOUT ANGINA PECTORIS: Chronic | ICD-10-CM

## 2022-11-15 DIAGNOSIS — E66.01 SEVERE OBESITY WITH BODY MASS INDEX (BMI) OF 35.0 TO 39.9 WITH SERIOUS COMORBIDITY: ICD-10-CM

## 2022-11-15 DIAGNOSIS — E11.9 CONTROLLED TYPE 2 DIABETES MELLITUS WITHOUT COMPLICATION, WITHOUT LONG-TERM CURRENT USE OF INSULIN: Chronic | ICD-10-CM

## 2022-11-15 PROCEDURE — 1170F FXNL STATUS ASSESSED: CPT | Performed by: STUDENT IN AN ORGANIZED HEALTH CARE EDUCATION/TRAINING PROGRAM

## 2022-11-15 PROCEDURE — 1160F RVW MEDS BY RX/DR IN RCRD: CPT | Performed by: STUDENT IN AN ORGANIZED HEALTH CARE EDUCATION/TRAINING PROGRAM

## 2022-11-15 PROCEDURE — G0439 PPPS, SUBSEQ VISIT: HCPCS | Performed by: STUDENT IN AN ORGANIZED HEALTH CARE EDUCATION/TRAINING PROGRAM

## 2022-11-15 RX ORDER — TOLTERODINE 4 MG/1
4 CAPSULE, EXTENDED RELEASE ORAL DAILY
COMMUNITY
Start: 2022-09-25 | End: 2023-03-09 | Stop reason: SDUPTHER

## 2022-11-15 RX ORDER — CLOPIDOGREL BISULFATE 75 MG/1
75 TABLET ORAL DAILY
COMMUNITY
End: 2022-11-15 | Stop reason: ALTCHOICE

## 2022-11-15 NOTE — PROGRESS NOTES
"QUICK REFERENCE INFORMATION:  The ABCs of the Annual Wellness Visit    Subsequent Medicare Wellness Visit    . Wife is health care proxy. Have 2 sons (born -66 and -73) - one in [place], [place], the other in [place], [place]. Worked in bank in the past, later on building management. Moved from [place] to prison home in [place] Summer .     Patient has a past medical history of coronary heart disease without MI and was stented in  and 2016, CHF, T2DM (3/2021), GERD, dyslipidemia, osteoarthritis of multiple joints, osteoarthritis of multiple joints, hypertension w/ \"whitecoat\" component, BPH, obesity and elevated transaminase levels.    Patient is here for annual wellness visit.  He is overall doing well.  He is finding it hard to come all the way to [place] for his medical care and is thinking about finding a local PCP in [place].    He has no current concerns or complaints.    HEALTH RISK ASSESSMENT    1940    Recent Hospitalizations:  No hospitalization(s) within the last year..        Current Medical Providers:  Patient Care Team:  Marko Lock MD as PCP - General (Family Medicine)        Smoking Status:  Social History     Tobacco Use   Smoking Status Former   • Packs/day: 1.00   • Years: 10.00   • Pack years: 10.00   • Types: Cigarettes   • Start date:    • Quit date: 1969   • Years since quittin.4   Smokeless Tobacco Never       Alcohol Consumption:  Social History     Substance and Sexual Activity   Alcohol Use Yes   • Alcohol/week: 6.0 standard drinks   • Types: 6 Shots of liquor per week    Comment: weekly       Depression Screen:   PHQ-2/PHQ-9 Depression Screening 11/15/2022   Retired PHQ-9 Total Score -   Retired Total Score -   Little Interest or Pleasure in Doing Things 1-->several days   Feeling Down, Depressed or Hopeless 0-->not at all   PHQ-9: Brief Depression Severity Measure Score 1       Health Habits and Functional and Cognitive " Screening:  Functional & Cognitive Status 11/15/2022   Do you have difficulty preparing food and eating? No   Do you have difficulty bathing yourself, getting dressed or grooming yourself? No   Do you have difficulty using the toilet? No   Do you have difficulty moving around from place to place? No   Do you have trouble with steps or getting out of a bed or a chair? No   Current Diet Unhealthy Diet   Dental Exam Up to date   Eye Exam Up to date   Exercise (times per week) 5 times per week   Current Exercises Include Treadmill   Do you need help using the phone?  No   Are you deaf or do you have serious difficulty hearing?  Yes   Do you need help with transportation? No   Do you need help shopping? No   Do you need help preparing meals?  No   Do you need help with housework?  No   Do you need help with laundry? No   Do you need help taking your medications? No   Do you need help managing money? No   Do you ever drive or ride in a car without wearing a seat belt? No   Have you felt unusual stress, anger or loneliness in the last month? No   Who do you live with? Spouse   If you need help, do you have trouble finding someone available to you? No   Have you been bothered in the last four weeks by sexual problems? No   Do you have difficulty concentrating, remembering or making decisions? No       Fall Risk Screen:  STEADI Fall Risk Assessment was completed, and patient is at MODERATE risk for falls. Assessment completed on:11/15/2022    ACE III MINI   ATTENTION  What is the year: correct  What is the month of the year: correct  What is the day of the week?: correct  What is the date?: correct  MEMORY  Repeat address three times, only score third attempt: K[address], [place]: 7  HOW MANY ANIMALS DID THE PATIENT NAME  Verbal Fluency -- Animal Names (0-25): 22+  CLOCK DRAWING  Clock Drawing: All Correct  MEMORY RECALL  Tell me what you remember about that name and address we were repeating at  the beginning: 3  ACE TOTAL SCORE  Total ACE Score - <25/30 strongly suggests cognitive impairment; <21/30 almost certainly shows dementia: 26    Does the patient have evidence of cognitive impairment? No    Aspirin use counseling: Taking ASA appropriately as indicated    Recent Lab Results:  CMP:  Lab Results   Component Value Date    BUN 8 11/10/2022    CREATININE 0.98 11/10/2022    EGFRIFNONA 110 09/01/2021    BCR 8.2 11/10/2022     11/10/2022    K 4.6 11/10/2022    CO2 26.6 11/10/2022    CALCIUM 9.7 11/10/2022    ALBUMIN 4.10 11/10/2022    BILITOT 0.8 11/10/2022    ALKPHOS 80 11/10/2022    AST 47 (H) 11/10/2022    ALT 59 (H) 11/10/2022     HbA1c:  Lab Results   Component Value Date    HGBA1C 6.90 (H) 11/10/2022    HGBA1C 6.70 (H) 07/26/2022     Microalbumin:  Lab Results   Component Value Date    MICROALBUR <1.2 11/10/2022     Lipid Panel  Lab Results   Component Value Date    CHOL 112 07/26/2022    TRIG 107 07/26/2022    HDL 42 07/26/2022    LDL 50 07/26/2022    AST 47 (H) 11/10/2022    ALT 59 (H) 11/10/2022       Visual Acuity:  No results found.    Age-appropriate Screening Schedule:  Refer to the list below for future screening recommendations based on patient's age, sex and/or medical conditions. Orders for these recommended tests are listed in the plan section. The patient has been provided with a written plan.    Health Maintenance   Topic Date Due   • HEMOGLOBIN A1C  05/10/2023   • LIPID PANEL  07/26/2023   • DIABETIC EYE EXAM  08/05/2023   • URINE MICROALBUMIN  11/10/2023   • TDAP/TD VACCINES (2 - Td or Tdap) 10/21/2032   • INFLUENZA VACCINE  Completed   • ZOSTER VACCINE  Completed        Subjective   History of Present Illness    Shady Newell is a 82 y.o. male who presents for a Subsequent Wellness Visit.    CHRONIC CONDITIONS    The following portions of the patient's history were reviewed and updated as appropriate: allergies, current medications, past family history, past medical  history, past social history, past surgical history and problem list.    Outpatient Medications Prior to Visit   Medication Sig Dispense Refill   • aspirin 81 MG EC tablet Take 1 tablet by mouth Daily. 1 tablet 0   • atorvastatin (LIPITOR) 10 MG tablet TAKE 1 TABLET BY MOUTH EVERY DAY AT NIGHT 90 tablet 3   • carvedilol (COREG) 6.25 MG tablet TAKE 1 TABLET BY MOUTH TWICE A  tablet 1   • finasteride (PROSCAR) 5 MG tablet Take 5 mg by mouth daily.     • glimepiride (AMARYL) 1 MG tablet TAKE 1 TABLET BY MOUTH EVERY DAY BEFORE BREAKFAST 90 tablet 1   • glucose blood test strip 1 each by Other route Daily As Needed (Blood sugar monitoring). Use as instructed 100 each 12   • glucose monitor monitoring kit 1 each Daily As Needed (Blood sugar monitoring). 1 each 0   • hydroCHLOROthiazide (HYDRODIURIL) 12.5 MG tablet TAKE 1 TABLET BY MOUTH EVERY DAY 90 tablet 3   • loratadine (CLARITIN) 10 MG tablet TAKE 1 TABLET BY MOUTH EVERY DAY AS NEEDED FOR ALLERGIES 90 tablet 1   • Multiple Vitamins-Minerals (MULTIVITAMIN ADULT PO) Take 1 tablet by mouth Daily.     • nitroglycerin (NITROSTAT) 0.4 MG SL tablet Place 1 tablet under the tongue Every 5 (Five) Minutes As Needed for Chest Pain. Take no more than 3 doses in 15 minutes. 30 tablet 1   • ramipril (ALTACE) 10 MG capsule TAKE 1 CAPSULE BY MOUTH TWICE A  capsule 1   • tamsulosin (FLOMAX) 0.4 MG capsule 24 hr capsule Take 1 capsule by mouth every night.     • tolterodine LA (DETROL LA) 4 MG 24 hr capsule Take 4 mg by mouth Daily.     • clopidogrel (PLAVIX) 75 MG tablet Take 75 mg by mouth Daily.     • famotidine (PEPCID) 20 MG tablet TAKE 1 TABLET BY MOUTH EVERYDAY AT BEDTIME 90 tablet 3     No facility-administered medications prior to visit.       Patient Active Problem List   Diagnosis   • Coronary artery disease involving native coronary artery of native heart without angina pectoris   • Mixed hyperlipidemia   • Essential hypertension   • Obesity, Class I, BMI  30-34.9   • Primary osteoarthritis involving multiple joints   • Benign prostatic hyperplasia with weak urinary stream   • Gastroesophageal reflux disease without esophagitis   • Controlled type 2 diabetes mellitus without complication, without long-term current use of insulin (HCC)   • Seasonal allergies   • Systolic murmur   • Full thickness rotator cuff tear   • Elevated transaminase level   • Glaucoma suspect of both eyes   • Nonexudative age-related macular degeneration, bilateral, early dry stage   • Meibomian gland dysfunction (MGD)   • History of COVID-19       Advance Care Planning:  ACP discussion was held with the patient during this visit. Patient has an advance directive in EMR which is still valid.     Identification of Risk Factors:  Risk factors include: Advance Directive Discussion  Obesity/Overweight .    Review of Systems    Compared to one year ago, the patient feels his physical health is the same.  Compared to one year ago, the patient feels his mental health is the same.    Objective     Physical Exam  Vitals reviewed.   Constitutional:       Appearance: Normal appearance.   HENT:      Head: Normocephalic and atraumatic.      Nose: No congestion.   Eyes:      Extraocular Movements: Extraocular movements intact.      Conjunctiva/sclera: Conjunctivae normal.   Cardiovascular:      Rate and Rhythm: Normal rate and regular rhythm.      Heart sounds: Normal heart sounds. No murmur heard.  Pulmonary:      Effort: Pulmonary effort is normal.      Breath sounds: Normal breath sounds.   Abdominal:      General: There is no distension.      Palpations: Abdomen is soft. There is no mass.      Tenderness: There is no abdominal tenderness.   Musculoskeletal:      Cervical back: Neck supple.      Right lower leg: No edema.      Left lower leg: No edema.   Skin:     General: Skin is warm and dry.   Neurological:      Mental Status: He is alert and oriented to person, place, and time. Mental status is at  "baseline.   Psychiatric:         Behavior: Behavior normal.         Thought Content: Thought content normal.          Procedures     Vitals:    11/15/22 1056 11/15/22 1109   BP: 150/84 124/80   BP Location: Left arm Left arm   Patient Position: Sitting Sitting   Cuff Size: Large Adult Large Adult   Pulse: 64    Temp: 98.4 °F (36.9 °C)    TempSrc: Temporal    Weight: 110 kg (243 lb 6.4 oz)    Height: 174.8 cm (68.8\")    PainSc: 0-No pain        Class 2 Severe Obesity (BMI >=35 and <=39.9). Obesity-related health conditions include the following: hypertension, coronary heart disease, diabetes mellitus, dyslipidemias, GERD and osteoarthritis. Obesity is unchanged. BMI is is above average; BMI management plan is completed. We discussed portion control and increasing exercise.      Assessment & Plan     1. Encounter for subsequent annual wellness visit (AWV) in Medicare patient      2. Controlled type 2 diabetes mellitus without complication, without long-term current use of insulin (HCC)  Hemoglobin A1C   Date Value Ref Range Status   11/10/2022 6.90 (H) 4.80 - 5.60 % Final   07/26/2022 6.70 (H) 4.80 - 5.60 % Final   05/16/2022 6.6 % Final   01/10/2022 6.6 % Final   09/01/2021 6.99 (H) 4.80 - 5.60 % Final   Good glycemic control.  Continue on current medications    3. Essential hypertension  BP Readings from Last 3 Encounters:   11/15/22 124/80   08/05/22 136/70   07/11/22 126/78   Blood pressure at goal and well-controlled.  Continue on current medications    4. Coronary artery disease involving native coronary artery of native heart without angina pectoris  Stable and doing well.  He is off Plavix by order of cardiology, continue on aspirin.    5. Severe obesity with body mass index (BMI) of 35.0 to 39.9 with serious comorbidity (HCC)    Patient Self-Management and Personalized Health Advice  The patient has been provided with information about: exercise and preventive services including:   · Annual Wellness Visit " (AWV).    Outpatient Encounter Medications as of 11/15/2022   Medication Sig Dispense Refill   • aspirin 81 MG EC tablet Take 1 tablet by mouth Daily. 1 tablet 0   • atorvastatin (LIPITOR) 10 MG tablet TAKE 1 TABLET BY MOUTH EVERY DAY AT NIGHT 90 tablet 3   • carvedilol (COREG) 6.25 MG tablet TAKE 1 TABLET BY MOUTH TWICE A  tablet 1   • finasteride (PROSCAR) 5 MG tablet Take 5 mg by mouth daily.     • glimepiride (AMARYL) 1 MG tablet TAKE 1 TABLET BY MOUTH EVERY DAY BEFORE BREAKFAST 90 tablet 1   • glucose blood test strip 1 each by Other route Daily As Needed (Blood sugar monitoring). Use as instructed 100 each 12   • glucose monitor monitoring kit 1 each Daily As Needed (Blood sugar monitoring). 1 each 0   • hydroCHLOROthiazide (HYDRODIURIL) 12.5 MG tablet TAKE 1 TABLET BY MOUTH EVERY DAY 90 tablet 3   • loratadine (CLARITIN) 10 MG tablet TAKE 1 TABLET BY MOUTH EVERY DAY AS NEEDED FOR ALLERGIES 90 tablet 1   • Multiple Vitamins-Minerals (MULTIVITAMIN ADULT PO) Take 1 tablet by mouth Daily.     • nitroglycerin (NITROSTAT) 0.4 MG SL tablet Place 1 tablet under the tongue Every 5 (Five) Minutes As Needed for Chest Pain. Take no more than 3 doses in 15 minutes. 30 tablet 1   • ramipril (ALTACE) 10 MG capsule TAKE 1 CAPSULE BY MOUTH TWICE A  capsule 1   • tamsulosin (FLOMAX) 0.4 MG capsule 24 hr capsule Take 1 capsule by mouth every night.     • tolterodine LA (DETROL LA) 4 MG 24 hr capsule Take 4 mg by mouth Daily.     • [DISCONTINUED] clopidogrel (PLAVIX) 75 MG tablet Take 75 mg by mouth Daily.     • [DISCONTINUED] famotidine (PEPCID) 20 MG tablet TAKE 1 TABLET BY MOUTH EVERYDAY AT BEDTIME 90 tablet 3     No facility-administered encounter medications on file as of 11/15/2022.       Reviewed use of high risk medication in the elderly: yes  Reviewed for potential of harmful drug interactions in the elderly: yes    Follow Up:  Return in about 3 months (around 2/15/2023) for Recheck.     There are no  Patient Instructions on file for this visit.    An After Visit Summary and PPPS with all of these plans were given to the patient.

## 2023-02-09 DIAGNOSIS — I10 ESSENTIAL HYPERTENSION: ICD-10-CM

## 2023-02-09 RX ORDER — HYDROCHLOROTHIAZIDE 12.5 MG/1
TABLET ORAL
Qty: 90 TABLET | Refills: 3 | Status: SHIPPED | OUTPATIENT
Start: 2023-02-09 | End: 2023-03-09 | Stop reason: SDUPTHER

## 2023-02-22 ENCOUNTER — OFFICE VISIT (OUTPATIENT)
Dept: INTERNAL MEDICINE | Facility: CLINIC | Age: 83
End: 2023-02-22
Payer: MEDICARE

## 2023-02-22 VITALS
SYSTOLIC BLOOD PRESSURE: 154 MMHG | HEART RATE: 64 BPM | DIASTOLIC BLOOD PRESSURE: 80 MMHG | BODY MASS INDEX: 36.35 KG/M2 | TEMPERATURE: 98.4 F | HEIGHT: 69 IN | WEIGHT: 245.4 LBS

## 2023-02-22 DIAGNOSIS — E11.9 CONTROLLED TYPE 2 DIABETES MELLITUS WITHOUT COMPLICATION, WITHOUT LONG-TERM CURRENT USE OF INSULIN: Primary | ICD-10-CM

## 2023-02-22 DIAGNOSIS — I10 ESSENTIAL HYPERTENSION: Chronic | ICD-10-CM

## 2023-02-22 DIAGNOSIS — E66.01 SEVERE OBESITY WITH BODY MASS INDEX (BMI) OF 35.0 TO 39.9 WITH SERIOUS COMORBIDITY: ICD-10-CM

## 2023-02-22 LAB
EXPIRATION DATE: NORMAL
HBA1C MFR BLD: 6.9 %
Lab: NORMAL

## 2023-02-22 PROCEDURE — 83036 HEMOGLOBIN GLYCOSYLATED A1C: CPT | Performed by: STUDENT IN AN ORGANIZED HEALTH CARE EDUCATION/TRAINING PROGRAM

## 2023-02-22 PROCEDURE — 99214 OFFICE O/P EST MOD 30 MIN: CPT | Performed by: STUDENT IN AN ORGANIZED HEALTH CARE EDUCATION/TRAINING PROGRAM

## 2023-02-22 PROCEDURE — 3044F HG A1C LEVEL LT 7.0%: CPT | Performed by: STUDENT IN AN ORGANIZED HEALTH CARE EDUCATION/TRAINING PROGRAM

## 2023-02-22 RX ORDER — MV-MN/OM3/DHA/EPA/FISH/LUT/ZEA 250-5-1 MG
1 CAPSULE ORAL DAILY
COMMUNITY
Start: 2023-02-11

## 2023-02-22 NOTE — PROGRESS NOTES
"Chief Complaint  Shady Newell is a 82 y.o. male presenting for Diabetes.     . Wife is health care proxy. Have 2 sons (born -66 and -73) - one in [facility] [place], the other in [place], [place]. 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     Patient has a past medical history of coronary heart disease without MI and was stented in 2006 and 2016, CHF, T2DM (3/2021), GERD, dyslipidemia, osteoarthritis of multiple joints, osteoarthritis of multiple joints, hypertension w/ \"whitecoat\" component, BPH, obesity and elevated transaminase levels.    History of Present Illness  Patient is here for follow-up.  He is upset today because of extensive check-in procedures with [facility].  He tells me he has filled out multiple previsit questionnaires over the last couple of days, and again today had to go over these questions on check-in.  He would like me to bring this forward to [facility] leadership.    He is overall doing well.  He recently was in [place] and ate a lot of good food and gained a few pounds that he would like to lose again.  He still reports poor food quality at his correction home in [place].    Patient has not checked his blood pressure at home recently.  In the past it has ranged in the 120s/70s.    The following portions of the patient's history were reviewed and updated as appropriate: allergies, current medications, past family history, past medical history, past social history, past surgical history and problem list.    Objective  /80 (BP Location: Left arm, Patient Position: Sitting, Cuff Size: Large Adult)   Pulse 64   Temp 98.4 °F (36.9 °C) (Temporal)   Ht 174.8 cm (68.82\")   Wt 111 kg (245 lb 6.4 oz)   BMI 36.43 kg/m²     Physical Exam  Constitutional:       Appearance: Normal appearance.   HENT:      Head: Normocephalic and atraumatic.   Eyes:      Extraocular Movements: Extraocular movements " intact.      Conjunctiva/sclera: Conjunctivae normal.   Pulmonary:      Effort: Pulmonary effort is normal. No respiratory distress.   Musculoskeletal:      Cervical back: Neck supple.   Neurological:      Mental Status: He is alert and oriented to person, place, and time. Mental status is at baseline.   Psychiatric:         Behavior: Behavior normal.         Thought Content: Thought content normal.          Assessment/Plan   1. Controlled type 2 diabetes mellitus without complication, without long-term current use of insulin (HCC)  Hemoglobin A1C   Date Value Ref Range Status   02/22/2023 6.9 % Final   11/10/2022 6.90 (H) 4.80 - 5.60 % Final   07/26/2022 6.70 (H) 4.80 - 5.60 % Final   05/16/2022 6.6 % Final   01/10/2022 6.6 % Final   09/01/2021 6.99 (H) 4.80 - 5.60 % Final   Good glycemic control.  We will continue on current medications  - POC Glycosylated Hemoglobin (Hb A1C)    2. Essential hypertension  BP Readings from Last 3 Encounters:   02/22/23 154/80   11/15/22 124/80   08/05/22 136/70   Blood pressure is not well controlled in the office today, may be partially because of recent weight gain and also situational as mentioned above.  We have discussed increasing medication versus repeat blood pressure check.  He will send me a [employer] message with 3 days worth of blood pressure measurements in 1-2 weeks, and if needed we can increase medication at that point.    3. Severe obesity with body mass index (BMI) of 35.0 to 39.9 with serious comorbidity ([facility])  Class 2 Severe Obesity (BMI >=35 and <=39.9). Obesity-related health conditions include the following: hypertension, coronary heart disease, diabetes mellitus, dyslipidemias, GERD and osteoarthritis. Obesity is worsening. BMI is is above average; BMI management plan is completed. We discussed portion control.    Return in about 3 months (around 5/22/2023) for Recheck.    Future Appointments       Provider Department Center    5/22/2023 1:30 PM [place]  Marko HOLCOMB MD [facility] INTERNAL MEDICINE [place]    7/21/2023 1:30 PM Markus Zhu MD [facility] CARDIOLOGY MAIN [place]          Marko Lock MD  Family Medicine  02/22/2023

## 2023-03-09 ENCOUNTER — PATIENT MESSAGE (OUTPATIENT)
Dept: INTERNAL MEDICINE | Facility: CLINIC | Age: 83
End: 2023-03-09
Payer: MEDICARE

## 2023-03-09 VITALS — DIASTOLIC BLOOD PRESSURE: 85 MMHG | SYSTOLIC BLOOD PRESSURE: 135 MMHG

## 2023-03-09 DIAGNOSIS — I10 ESSENTIAL HYPERTENSION: ICD-10-CM

## 2023-03-09 RX ORDER — TAMSULOSIN HYDROCHLORIDE 0.4 MG/1
1 CAPSULE ORAL NIGHTLY
Qty: 90 CAPSULE | Refills: 1 | Status: SHIPPED | OUTPATIENT
Start: 2023-03-09

## 2023-03-09 RX ORDER — TOLTERODINE 4 MG/1
4 CAPSULE, EXTENDED RELEASE ORAL DAILY
Qty: 90 CAPSULE | Refills: 1 | Status: SHIPPED | OUTPATIENT
Start: 2023-03-09 | End: 2023-03-24 | Stop reason: SDUPTHER

## 2023-03-09 RX ORDER — FINASTERIDE 5 MG/1
5 TABLET, FILM COATED ORAL DAILY
Qty: 90 TABLET | Refills: 1 | Status: SHIPPED | OUTPATIENT
Start: 2023-03-09

## 2023-03-09 RX ORDER — HYDROCHLOROTHIAZIDE 25 MG/1
25 TABLET ORAL DAILY
Qty: 90 TABLET | Refills: 3 | Status: SHIPPED | OUTPATIENT
Start: 2023-03-09

## 2023-03-09 NOTE — TELEPHONE ENCOUNTER
From: Shady Newell  To: Marko Lock  Sent: 3/9/2023 11:34 AM EST  Subject: CAN YOU HELP ME ON REFILLS IDENTIFIED BELOW    I WENT TO MY UROLOGIST ON FEB 27TH FOR MY REGULAR SIX MONTH CHECK UP. I TOLD HIM THAT NOW I LIVE IN [place] AND COULD HE RECOMMEND SOMEBODY THERE. HE DID WITHOUT QUESTION. I CALLED HIS RECOMMENDED UROLOGIST, DR. JOSY HARRIS WHO ACCEPTED ME FOR AN APPOINTMENT ON AUGUST 24TH. THAT WAS THE TIME MY NEXT APPOINTMENT WITH DR. EMILEE CH WAS SCHEDULED FOR IF I COULDN'T GET SOMEBODY IN [place]. A FEW DAYS LATER, MY [facility] WANTED TO REFILL ONE OF HIS PRESCRIPTIONS FOR ME AND DR. CH REFUSED! [facility] SAID THEY WOULD TRY MY INTERNIST. AS OF NOW, I'VE HEARD NOTING FROM ANYBODY.    THEREFORE, I AM ASKING IF YOU WOULD REFILL MY THREE PRESCRIPTIONS IF AND WHEN THE [facility] ASKS? THE REFILLS WOULD HOPEFULLY ONLY RUN TO LATE AUGUST WHEN I SEE DR HARRIS WHO PRESUMABLY WOULD DECIDE TO PRESCRIBE WHAT HE THINKS IS BEST FOR ME.    THE THREE PRESCRIPTIONS ARE TOLTERODINE TART ER 4 MG CAP, FINASTERIDE 5MG TABLET AND TAMSULOSIN HCL 0.4 MG CAP.     I WOULD APPRECIATE YOUR HELP VERY MUCH. THE PHARMACY IS IN MY CHART AND ALL THREE DRUGS ARE LISTED IN MY MEDICATIONS IN MY ACCOUNT.    THANK YOU     PAOLO NEWELL

## 2023-03-09 NOTE — TELEPHONE ENCOUNTER
From: Shady Newell  To: Marko Lock  Sent: 3/9/2023 11:13 AM EST  Subject: YOU ASKED AT OUR LAST VISIT THAT I TAKE MY BLOOD PRESSURE AND SEND YOU THE RESULTS AFTER HAVING DONE SO FOR A FEW DAYS.    THE RESULTS ARE:  3/5 6:00 /83  3/6. 9:50 AM. 147/84  3/7. 9:30 AM. 135/85  3/8. 9:50 AM. 144/88  3/9. 10:50 AM. 150/83      I AM SENDING YOU A SECOND MESSAGE PLEADING FOR HELP ON PRESCRIPTIONS WHICH I WILL EXPLAIN FURTHER IN THAT MESSAGE.    THANK YOU AND HAVE A GOOD DAY.    PAOLO NEWELL

## 2023-03-13 ENCOUNTER — PRIOR AUTHORIZATION (OUTPATIENT)
Dept: INTERNAL MEDICINE | Facility: CLINIC | Age: 83
End: 2023-03-13
Payer: MEDICARE

## 2023-03-13 NOTE — TELEPHONE ENCOUNTER
"Received PA request for tolterodine. Try to submit through [facility] but received message, \"[facility] is not able to process this request through [facility], please contact the plan at [phone] or fax in request to [phone].\"      Called the number and did the PA over the phone.  PA has been approved. Case # [id number].   "

## 2023-03-17 DIAGNOSIS — K21.9 GASTROESOPHAGEAL REFLUX DISEASE WITHOUT ESOPHAGITIS: ICD-10-CM

## 2023-03-17 RX ORDER — FAMOTIDINE 20 MG/1
TABLET, FILM COATED ORAL
Qty: 90 TABLET | Refills: 3 | Status: SHIPPED | OUTPATIENT
Start: 2023-03-17

## 2023-03-17 NOTE — TELEPHONE ENCOUNTER
Called and spoke with patient. He checked his medications and he is taking this medication as prescribed and does need a refill

## 2023-03-17 NOTE — TELEPHONE ENCOUNTER
Rx Refill Note  Requested Prescriptions     Pending Prescriptions Disp Refills   • famotidine (PEPCID) 20 MG tablet [Pharmacy Med Name: FAMOTIDINE 20 MG TABLET] 90 tablet 3     Sig: TAKE 1 TABLET BY MOUTH EVERYDAY AT BEDTIME      Last office visit with prescribing clinician: 2/22/2023    Next office visit with prescribing clinician: 5/22/2023                         Would you like a call back once the refill request has been completed: [] Yes [] No    If the office needs to give you a call back, can they leave a voicemail: [] Yes [] No    Leeann Juan LPN  03/17/23, 08:30 EDT

## 2023-03-17 NOTE — TELEPHONE ENCOUNTER
I think patient had stopped taking this, it is off his list.  May be check if he is still on it and needs refills, if so route back to me for approval.  Thanks

## 2023-03-20 DIAGNOSIS — I10 ESSENTIAL HYPERTENSION: ICD-10-CM

## 2023-03-20 DIAGNOSIS — E11.9 CONTROLLED TYPE 2 DIABETES MELLITUS WITHOUT COMPLICATION, WITHOUT LONG-TERM CURRENT USE OF INSULIN: ICD-10-CM

## 2023-03-20 RX ORDER — RAMIPRIL 10 MG/1
CAPSULE ORAL
Qty: 180 CAPSULE | Refills: 1 | Status: SHIPPED | OUTPATIENT
Start: 2023-03-20

## 2023-03-20 RX ORDER — CARVEDILOL 6.25 MG/1
TABLET ORAL
Qty: 180 TABLET | Refills: 1 | Status: SHIPPED | OUTPATIENT
Start: 2023-03-20

## 2023-03-20 RX ORDER — GLIMEPIRIDE 1 MG/1
TABLET ORAL
Qty: 90 TABLET | Refills: 1 | Status: SHIPPED | OUTPATIENT
Start: 2023-03-20

## 2023-03-24 ENCOUNTER — PRIOR AUTHORIZATION (OUTPATIENT)
Dept: INTERNAL MEDICINE | Facility: CLINIC | Age: 83
End: 2023-03-24
Payer: MEDICARE

## 2023-03-24 RX ORDER — TOLTERODINE 4 MG/1
4 CAPSULE, EXTENDED RELEASE ORAL DAILY
Qty: 90 CAPSULE | Refills: 1 | Status: SHIPPED | OUTPATIENT
Start: 2023-03-24

## 2023-03-24 NOTE — TELEPHONE ENCOUNTER
PA was requested for Tolterodine   Waiting on patient to send a picture of Rx card. I received patient's Rx numbers  did PA but was informed PA was not needed. I called pharmacy they started patient has a high cost after insurance pays over $400.00  Patient wants it sent to [facility] in [place] where he can a 90 day supply with GoodRx card for $68.02     BIN - [id number]  [facility]  GROUP - [id number]  MEMBER ID - [id number]    Rx was sent to [facility] pharmacy tech is getting it ready

## 2023-05-04 ENCOUNTER — PATIENT MESSAGE (OUTPATIENT)
Dept: INTERNAL MEDICINE | Facility: CLINIC | Age: 83
End: 2023-05-04
Payer: MEDICARE

## 2023-05-04 NOTE — TELEPHONE ENCOUNTER
From: Shady Newell  To: Marko Lock  Sent: 5/4/2023 11:59 AM EDT  Subject: COVID-19 VACCINATION RECORD    I RECEIVED THE NEW INJECTION PFIZER BIOLENT(SPELLING?) [id number] ON MAY 1, 2023 BY [facility] IN [facility].

## 2023-05-22 ENCOUNTER — OFFICE VISIT (OUTPATIENT)
Dept: INTERNAL MEDICINE | Facility: CLINIC | Age: 83
End: 2023-05-22
Payer: MEDICARE

## 2023-05-22 VITALS
SYSTOLIC BLOOD PRESSURE: 130 MMHG | RESPIRATION RATE: 19 BRPM | BODY MASS INDEX: 36.56 KG/M2 | OXYGEN SATURATION: 95 % | HEART RATE: 83 BPM | DIASTOLIC BLOOD PRESSURE: 76 MMHG | WEIGHT: 246.8 LBS | HEIGHT: 69 IN | TEMPERATURE: 97.8 F

## 2023-05-22 DIAGNOSIS — I10 ESSENTIAL HYPERTENSION: Chronic | ICD-10-CM

## 2023-05-22 DIAGNOSIS — R15.9 INCONTINENCE OF FECES, UNSPECIFIED FECAL INCONTINENCE TYPE: ICD-10-CM

## 2023-05-22 DIAGNOSIS — E11.9 CONTROLLED TYPE 2 DIABETES MELLITUS WITHOUT COMPLICATION, WITHOUT LONG-TERM CURRENT USE OF INSULIN: Primary | Chronic | ICD-10-CM

## 2023-05-22 LAB
EXPIRATION DATE: NORMAL
HBA1C MFR BLD: 6.8 %
Lab: NORMAL

## 2023-05-22 RX ORDER — SEMAGLUTIDE 1.34 MG/ML
INJECTION, SOLUTION SUBCUTANEOUS
Qty: 4.5 ML | Refills: 0 | Status: SHIPPED | OUTPATIENT
Start: 2023-05-22 | End: 2023-08-20

## 2023-05-22 NOTE — PROGRESS NOTES
"Chief Complaint  Shady Newell is a 82 y.o. male presenting for Follow-up (3 month F/U diabetes).     . Wife is health care proxy. Have 2 sons (born -66 and -73) - one in [place], [place], the other in [place], [place]. Worked in bank in the past, later on building management. Moved from [place] to [facility] home in [place] Summer 2021.     Patient has a past medical history of coronary heart disease without MI and was stented in 2006 and 2016, CHF, T2DM (3/2021), GERD, dyslipidemia, osteoarthritis of multiple joints, osteoarthritis of multiple joints, hypertension w/ \"whitecoat\" component, BPH, obesity and elevated transaminase levels.    History of Present Illness  Patient is here for follow-up.    He did mention interest in trying Mounjaro or similar medication for weight loss.  He did not check with his insurance if it might be covered.    He is reporting some fecal incontinence at times, with seeping of fluid.  He had his last colonoscopy in 2016.  He does report constipation at times, and has not tried any laxatives as of yet.    The following portions of the patient's history were reviewed and updated as appropriate: allergies, current medications, past family history, past medical history, past social history, past surgical history and problem list.    Objective  /76 (BP Location: Left arm, Patient Position: Sitting, Cuff Size: Large Adult)   Pulse 83   Temp 97.8 °F (36.6 °C) (Infrared)   Resp 19   Ht 174.8 cm (68.82\")   Wt 112 kg (246 lb 12.8 oz)   SpO2 95%   BMI 36.64 kg/m²     Physical Exam  Constitutional:       Appearance: Normal appearance.   Eyes:      Extraocular Movements: Extraocular movements intact.      Conjunctiva/sclera: Conjunctivae normal.   Pulmonary:      Effort: Pulmonary effort is normal. No respiratory distress.   Musculoskeletal:      Cervical back: Neck supple.   Neurological:      Mental Status: He is alert and oriented to person, place, and time. Mental " status is at baseline.   Psychiatric:         Behavior: Behavior normal.         Thought Content: Thought content normal.         Assessment/Plan   1. Controlled type 2 diabetes mellitus without complication, without long-term current use of insulin  Hemoglobin A1C   Date Value Ref Range Status   05/22/2023 6.8 % Final   02/22/2023 6.9 % Final   11/10/2022 6.90 (H) 4.80 - 5.60 % Final   07/26/2022 6.70 (H) 4.80 - 5.60 % Final   05/16/2022 6.6 % Final   09/01/2021 6.99 (H) 4.80 - 5.60 % Final   Good glycemic control.  He has not been able to lose weight, overall he has gained weight since he moved to [place].  It looks like Mounjaro would not be covered under his plan, but possibly Ozempic might be needed.  Recommend trial of Ozempic for his diabetes.  He should keep an eye on his blood sugar, and if blood sugar starts to trend down we might have to discontinue glimepiride/Amaryl.  He did try metformin in the past, but had GI side effects and could not tolerate.  Discussed side effects of Ozempic including delayed gastric emptying, feeling full for longer time, loss of appetite, abdominal discomfort.  Also increased risk for infections like bacterial infections of the urine and yeast.  There is also a chance for severe abdominal pain/pancreatitis, for which she should stop the medication right away and get in touch.  We will ramp up the dose and start with 0.25 mg for 4 weeks, and then increase to 0.5 mg.  He will follow-up with me in 3 months.  - POC Glycosylated Hemoglobin (Hb A1C)  - Semaglutide,0.25 or 0.5MG/DOS, (Ozempic, 0.25 or 0.5 MG/DOSE,) 2 MG/1.5ML solution pen-injector; Inject 0.25 mg under the skin into the appropriate area as directed 1 (One) Time Per Week for 28 days, THEN 0.5 mg 1 (One) Time Per Week for 62 days.  Dispense: 4.5 mL; Refill: 0    2. Essential hypertension  BP Readings from Last 3 Encounters:   05/22/23 130/76   03/09/23 135/85   02/22/23 154/80   Initial blood pressure elevated, but  repeat improved.  He does have component of whitecoat phenomena.    3. Incontinence of feces, unspecified fecal incontinence type  Suspect component of constipation.  We will do trial of MiraLAX and see if that improves his symptoms.  I have also added patient reference after visit summary, including Kegel exercises that I have reviewed with patient.  If this gets worse we might have to consider another colonoscopy.  We also discussed possibility for doing physical therapy with pelvic floor dysfunction, which can also help strengthen pelvic floor and reduce fecal incontinence      Return in about 13 years (around 5/22/2036) for Recheck.    Future Appointments       Provider Department Center    7/21/2023 1:30 PM Markus Zhu MD [facility]Y [facility]    8/23/2023 1:30 PM Marko Lock MD [facility] INTERNAL MEDICINE [facility]          Marko Lock MD  Family Medicine  05/22/2023

## 2023-08-23 ENCOUNTER — OFFICE VISIT (OUTPATIENT)
Dept: INTERNAL MEDICINE | Facility: CLINIC | Age: 83
End: 2023-08-23
Payer: MEDICARE

## 2023-08-23 VITALS
TEMPERATURE: 98 F | DIASTOLIC BLOOD PRESSURE: 68 MMHG | BODY MASS INDEX: 36.4 KG/M2 | HEIGHT: 69 IN | HEART RATE: 72 BPM | WEIGHT: 245.8 LBS | SYSTOLIC BLOOD PRESSURE: 124 MMHG

## 2023-08-23 DIAGNOSIS — E11.9 CONTROLLED TYPE 2 DIABETES MELLITUS WITHOUT COMPLICATION, WITHOUT LONG-TERM CURRENT USE OF INSULIN: Primary | ICD-10-CM

## 2023-08-23 DIAGNOSIS — R26.89 BALANCE PROBLEM: ICD-10-CM

## 2023-08-23 DIAGNOSIS — E78.2 MIXED HYPERLIPIDEMIA: Chronic | ICD-10-CM

## 2023-08-23 LAB
EXPIRATION DATE: NORMAL
HBA1C MFR BLD: 7.3 %
Lab: NORMAL

## 2023-08-23 RX ORDER — GLIMEPIRIDE 2 MG/1
2 TABLET ORAL
Qty: 90 TABLET | Refills: 1 | Status: SHIPPED | OUTPATIENT
Start: 2023-08-23

## 2023-08-23 RX ORDER — CLOPIDOGREL BISULFATE 75 MG/1
75 TABLET ORAL DAILY
COMMUNITY

## 2023-08-23 RX ORDER — OXYBUTYNIN CHLORIDE 10 MG/1
10 TABLET, EXTENDED RELEASE ORAL DAILY
COMMUNITY

## 2023-08-23 NOTE — PROGRESS NOTES
"Chief Complaint  Shady Newell is a 83 y.o. male presenting for Diabetes.     . Wife is health care proxy. Have 2 sons (born -66 and -73) - one in [place], [place], the other in [place], [place]. Worked in bank in the past, later on building management. M[facility] from [place] [facility] in [place] Summer 2021.     Patient has a past medical history of coronary heart disease without MI and was stented in 2006 and 2016, CHF, T2DM (3/2021), GERD, dyslipidemia, osteoarthritis of multiple joints, osteoarthritis of multiple joints, hypertension w/ \"whitecoat\" component, BPH, obesity and elevated transaminase levels.    History of Present Illness  Patient is here for follow-up.    He is reporting some balance concerns at times, typically when he turns his head quickly.  No issues when getting in or out of bed, turning in bed, bending forwards.  Able to walk straight without issues, but he is worried about falling.  He has not had any falls.  He tells me that the mild imbalance he experiences when turning his head quickly just lasts a few seconds.    Patient did not get coverage on Ozempic or Mounjaro, and wants to continue on his current medications.    Of note they will be traveling to [place] for 2 weeks in October on a cruise, starting in [facility] and going to [place].    The following portions of the patient's history were reviewed and updated as appropriate: allergies, current medications, past family history, past medical history, past social history, past surgical history, and problem list.    Objective  /68 (BP Location: Left arm, Patient Position: Sitting, Cuff Size: Large Adult)   Pulse 72   Temp 98 øF (36.7 øC) (Temporal)   Ht 176.5 cm (69.49\")   Wt 111 kg (245 lb 12.8 oz)   BMI 35.79 kg/mý     Physical Exam  Vitals reviewed.   Constitutional:       Appearance: Normal appearance.   HENT:      Head: Normocephalic and atraumatic.      Right Ear: Tympanic membrane, ear canal and external ear " normal. There is no impacted cerumen.      Left Ear: Tympanic membrane, ear canal and external ear normal. There is no impacted cerumen.      Nose: Nose normal. No congestion.      Mouth/Throat:      Mouth: Mucous membranes are moist.   Eyes:      Extraocular Movements: Extraocular movements intact.      Conjunctiva/sclera: Conjunctivae normal.   Neck:      Vascular: No carotid bruit.   Cardiovascular:      Rate and Rhythm: Normal rate and regular rhythm.      Heart sounds: Normal heart sounds. No murmur heard.  Pulmonary:      Effort: Pulmonary effort is normal.      Breath sounds: Normal breath sounds.   Musculoskeletal:      Cervical back: Neck supple.   Skin:     General: Skin is warm and dry.   Neurological:      Mental Status: He is alert and oriented to person, place, and time. Mental status is at baseline.      Motor: No weakness.      Gait: Gait normal.      Comments: Shakeel-Hallpike is negative bilaterally.   Psychiatric:         Behavior: Behavior normal.         Thought Content: Thought content normal.       Assessment/Plan   1. Controlled type 2 diabetes mellitus without complication, without long-term current use of insulin  Hemoglobin A1C   Date Value Ref Range Status   08/23/2023 7.3 % Final   05/22/2023 6.8 % Final   02/22/2023 6.9 % Final   11/10/2022 6.90 (H) 4.80 - 5.60 % Final   07/26/2022 6.70 (H) 4.80 - 5.60 % Final   09/01/2021 6.99 (H) 4.80 - 5.60 % Final   Diabetes is not at goal with A1c below 7.  We have tried other options, but for now I recommend increasing glimepiride/Amaryl from 1 mg to 2 mg.  Follow-up in about 3 months.  He will do blood work before his annual Medicare visit.  - POC Glycosylated Hemoglobin (Hb A1C)  - glimepiride (AMARYL) 2 MG tablet; Take 1 tablet by mouth Every Morning Before Breakfast.  Dispense: 90 tablet; Refill: 1  - MicroAlbumin, Urine, Random - Urine, Clean Catch; Future  - Comprehensive Metabolic Panel; Future  - Hemoglobin A1c; Future    2. Mixed  hyperlipidemia  We will do fasting lipids before his next visit  - Lipid Panel; Future    3. Balance problem  Unclear cause.  Will check vitamin B12.  Could be a component of BPPV.  Short lasting episodes, no falls.  We discussed referral for physical therapy.  He will look into this and let me know if he needs a referral.  - Vitamin B12; Future  - Methylmalonic Acid, Serum; Future      Return in about 13 weeks (around 11/22/2023) for Medicare Wellness.    Future Appointments         Provider Department Center    11/27/2023 2:00 PM Marko Lock MD [facility] INTERNAL MEDICINE [facility]    9/13/2024 1:45 PM Markus Zhu MD [facility] CARDIOLOGY [facility]              Marko Lock MD  Family Medicine  08/23/2023

## 2023-09-10 DIAGNOSIS — I10 ESSENTIAL HYPERTENSION: ICD-10-CM

## 2023-09-11 RX ORDER — CARVEDILOL 6.25 MG/1
TABLET ORAL
Qty: 180 TABLET | Refills: 1 | Status: SHIPPED | OUTPATIENT
Start: 2023-09-11

## 2023-09-11 NOTE — TELEPHONE ENCOUNTER
Rx Refill Note  Requested Prescriptions     Pending Prescriptions Disp Refills    carvedilol (COREG) 6.25 MG tablet [Pharmacy Med Name: CARVEDILOL 6.25 MG TABLET] 180 tablet 1     Sig: TAKE 1 TABLET BY MOUTH TWICE A DAY      Last office visit with prescribing clinician: 8/23/2023   Last telemedicine visit with prescribing clinician: Visit date not found   Next office visit with prescribing clinician: 11/27/2023                         Would you like a call back once the refill request has been completed: [] Yes [] No    If the office needs to give you a call back, can they leave a voicemail: [] Yes [] No    Tr Foy MA  09/11/23, 09:21 EDT

## 2023-09-11 NOTE — TELEPHONE ENCOUNTER
I will assume this is an error, but it shows in the instructions that patient is taking Coreg 12.5 mg twice daily, but I do not see any dose change.  Please check with patient if this is an error, I had him listed with 6.25 mg twice daily previously.  Please route back to me after clarification and I will approve refill.  Thanks

## 2023-09-14 RX ORDER — RAMIPRIL 10 MG/1
CAPSULE ORAL
Qty: 180 CAPSULE | Refills: 1 | Status: SHIPPED | OUTPATIENT
Start: 2023-09-14

## 2023-09-19 ENCOUNTER — PATIENT MESSAGE (OUTPATIENT)
Dept: INTERNAL MEDICINE | Facility: CLINIC | Age: 83
End: 2023-09-19
Payer: MEDICARE

## 2023-09-20 NOTE — TELEPHONE ENCOUNTER
From: Shady Newell  To: Marko Lock  Sent: 9/19/2023 7:10 PM EDT  Subject: REFILL OF MY RAMIPRIL    My most recent refill has given me a six month supply. That's okay, but I really don't need that much.Please when the next refill comes up, do not fill it so I can get back to 90 supply and renewal pattern. Thanks.

## 2023-09-27 DIAGNOSIS — E11.9 CONTROLLED TYPE 2 DIABETES MELLITUS WITHOUT COMPLICATION, WITHOUT LONG-TERM CURRENT USE OF INSULIN: ICD-10-CM

## 2023-09-27 RX ORDER — GLIMEPIRIDE 1 MG/1
TABLET ORAL
Qty: 90 TABLET | Refills: 1 | OUTPATIENT
Start: 2023-09-27

## 2023-10-16 ENCOUNTER — PATIENT MESSAGE (OUTPATIENT)
Dept: INTERNAL MEDICINE | Facility: CLINIC | Age: 83
End: 2023-10-16
Payer: MEDICARE

## 2023-10-16 NOTE — TELEPHONE ENCOUNTER
That is fine with me if he needs physical prescription for his medications, please prepare and I will sign

## 2023-10-23 NOTE — TELEPHONE ENCOUNTER
I spoke to patient to go over which medications needed to have a written prescription for and informed him I will give him a call when I have then done and signed. Patient verbalized understanding.

## 2023-10-24 ENCOUNTER — TELEPHONE (OUTPATIENT)
Dept: INTERNAL MEDICINE | Facility: CLINIC | Age: 83
End: 2023-10-24
Payer: MEDICARE

## 2023-10-24 NOTE — TELEPHONE ENCOUNTER
PT CALLED AND STATED HE IS LEAVING OUT OF [place] TOMORROW.  PT IS WAITING FOR SCRIPTS.  PT STATED HE RECEIVED A MISSED CALL YESTERDAY.    PT WOULD LIKE A CALL BACK [phone].

## 2023-11-08 DIAGNOSIS — E78.5 DYSLIPIDEMIA: ICD-10-CM

## 2023-11-09 RX ORDER — ATORVASTATIN CALCIUM 10 MG/1
TABLET, FILM COATED ORAL
Qty: 90 TABLET | Refills: 3 | Status: SHIPPED | OUTPATIENT
Start: 2023-11-09

## 2023-11-20 ENCOUNTER — LAB (OUTPATIENT)
Dept: LAB | Facility: HOSPITAL | Age: 83
End: 2023-11-20
Payer: MEDICARE

## 2023-11-20 DIAGNOSIS — E78.2 MIXED HYPERLIPIDEMIA: Chronic | ICD-10-CM

## 2023-11-20 DIAGNOSIS — R26.89 BALANCE PROBLEM: ICD-10-CM

## 2023-11-20 DIAGNOSIS — E11.9 CONTROLLED TYPE 2 DIABETES MELLITUS WITHOUT COMPLICATION, WITHOUT LONG-TERM CURRENT USE OF INSULIN: ICD-10-CM

## 2023-11-20 LAB
ALBUMIN SERPL-MCNC: 4.1 G/DL (ref 3.5–5.2)
ALBUMIN UR-MCNC: 1.4 MG/DL
ALBUMIN/GLOB SERPL: 1.6 G/DL
ALP SERPL-CCNC: 91 U/L (ref 39–117)
ALT SERPL W P-5'-P-CCNC: 51 U/L (ref 1–41)
ANION GAP SERPL CALCULATED.3IONS-SCNC: 9.1 MMOL/L (ref 5–15)
AST SERPL-CCNC: 35 U/L (ref 1–40)
BILIRUB SERPL-MCNC: 0.7 MG/DL (ref 0–1.2)
BUN SERPL-MCNC: 11 MG/DL (ref 8–23)
BUN/CREAT SERPL: 12.8 (ref 7–25)
CALCIUM SPEC-SCNC: 9.4 MG/DL (ref 8.6–10.5)
CHLORIDE SERPL-SCNC: 101 MMOL/L (ref 98–107)
CHOLEST SERPL-MCNC: 105 MG/DL (ref 0–200)
CO2 SERPL-SCNC: 27.9 MMOL/L (ref 22–29)
CREAT SERPL-MCNC: 0.86 MG/DL (ref 0.76–1.27)
EGFRCR SERPLBLD CKD-EPI 2021: 85.9 ML/MIN/1.73
GLOBULIN UR ELPH-MCNC: 2.5 GM/DL
GLUCOSE SERPL-MCNC: 149 MG/DL (ref 65–99)
HBA1C MFR BLD: 7.1 % (ref 4.8–5.6)
HDLC SERPL-MCNC: 40 MG/DL (ref 40–60)
LDLC SERPL CALC-MCNC: 46 MG/DL (ref 0–100)
LDLC/HDLC SERPL: 1.12 {RATIO}
POTASSIUM SERPL-SCNC: 4.6 MMOL/L (ref 3.5–5.2)
PROT SERPL-MCNC: 6.6 G/DL (ref 6–8.5)
SODIUM SERPL-SCNC: 138 MMOL/L (ref 136–145)
TRIGL SERPL-MCNC: 101 MG/DL (ref 0–150)
VIT B12 BLD-MCNC: 669 PG/ML (ref 211–946)
VLDLC SERPL-MCNC: 19 MG/DL (ref 5–40)

## 2023-11-20 PROCEDURE — 36415 COLL VENOUS BLD VENIPUNCTURE: CPT

## 2023-11-20 PROCEDURE — 82043 UR ALBUMIN QUANTITATIVE: CPT

## 2023-11-20 PROCEDURE — 83921 ORGANIC ACID SINGLE QUANT: CPT

## 2023-11-20 PROCEDURE — 80061 LIPID PANEL: CPT

## 2023-11-20 PROCEDURE — 82607 VITAMIN B-12: CPT

## 2023-11-20 PROCEDURE — 83036 HEMOGLOBIN GLYCOSYLATED A1C: CPT

## 2023-11-20 PROCEDURE — 80053 COMPREHEN METABOLIC PANEL: CPT

## 2023-11-26 LAB — METHYLMALONATE SERPL-SCNC: 122 NMOL/L (ref 0–378)

## 2023-11-27 ENCOUNTER — OFFICE VISIT (OUTPATIENT)
Dept: INTERNAL MEDICINE | Facility: CLINIC | Age: 83
End: 2023-11-27
Payer: MEDICARE

## 2023-11-27 VITALS
HEART RATE: 60 BPM | BODY MASS INDEX: 35.96 KG/M2 | TEMPERATURE: 99.3 F | DIASTOLIC BLOOD PRESSURE: 82 MMHG | SYSTOLIC BLOOD PRESSURE: 134 MMHG | HEIGHT: 69 IN | WEIGHT: 242.8 LBS

## 2023-11-27 DIAGNOSIS — E11.9 CONTROLLED TYPE 2 DIABETES MELLITUS WITHOUT COMPLICATION, WITHOUT LONG-TERM CURRENT USE OF INSULIN: Chronic | ICD-10-CM

## 2023-11-27 DIAGNOSIS — I25.10 CORONARY ARTERY DISEASE INVOLVING NATIVE CORONARY ARTERY OF NATIVE HEART WITHOUT ANGINA PECTORIS: Chronic | ICD-10-CM

## 2023-11-27 DIAGNOSIS — I10 ESSENTIAL HYPERTENSION: Chronic | ICD-10-CM

## 2023-11-27 DIAGNOSIS — N40.1 BENIGN PROSTATIC HYPERPLASIA WITH WEAK URINARY STREAM: Chronic | ICD-10-CM

## 2023-11-27 DIAGNOSIS — N39.41 URGE INCONTINENCE OF URINE: Chronic | ICD-10-CM

## 2023-11-27 DIAGNOSIS — R39.12 BENIGN PROSTATIC HYPERPLASIA WITH WEAK URINARY STREAM: Chronic | ICD-10-CM

## 2023-11-27 DIAGNOSIS — Z00.00 ENCOUNTER FOR SUBSEQUENT ANNUAL WELLNESS VISIT (AWV) IN MEDICARE PATIENT: Primary | ICD-10-CM

## 2023-11-27 DIAGNOSIS — E66.01 SEVERE OBESITY WITH BODY MASS INDEX (BMI) OF 35.0 TO 39.9 WITH SERIOUS COMORBIDITY: ICD-10-CM

## 2023-11-27 RX ORDER — TOLTERODINE 4 MG/1
4 CAPSULE, EXTENDED RELEASE ORAL DAILY
Qty: 90 CAPSULE | Refills: 1 | Status: SHIPPED | OUTPATIENT
Start: 2023-11-27

## 2023-11-27 RX ORDER — NITROGLYCERIN 0.4 MG/1
0.4 TABLET SUBLINGUAL
Qty: 30 TABLET | Refills: 1 | Status: SHIPPED | OUTPATIENT
Start: 2023-11-27

## 2023-11-27 NOTE — PROGRESS NOTES
"QUICK REFERENCE INFORMATION:  The ABCs of the Annual Wellness Visit    Subsequent Medicare Wellness Visit    . Wife is health care proxy. Have 2 sons (born -66 and -73) - one in [place], [place], the other in [place], [place]. Worked in bank in the past, later on building management. Moved from [place] to jail home in [place] Summer 2021.     Patient has a past medical history of coronary heart disease without MI and was stented in 2006 and 2016, CHF, T2DM (3/2021), GERD, dyslipidemia, osteoarthritis of multiple joints, osteoarthritis of multiple joints, hypertension w/ \"whitecoat\" component, BPH, obesity and elevated transaminase levels.    Patient is here for annual Medicare visit and follow-up of his chronic conditions.    Patient does follow with cardiology.  He had stents placed in the past and tells me he was on Plavix for years in addition to aspirin, but after discussing with the cardiologist they recommended to discontinue Plavix.    He tries to stay physically active.  He excises 15 minutes every day.  He recently went to [place] for medication and he tells me he came home with a cold and was with the weather for couple of weeks.  He has now recovered and doing well.    Patient saw urology in the past for urge incontinence, and also for BPH.  They did start him on tolterodine in addition to tamsulosin, and this has been helpful, he has not experience any urinary leakage.  Also no urinary retention.  He is requesting refill on his tolterodine.    HEALTH RISK ASSESSMENT    1940    Recent Hospitalizations:  No hospitalization(s) within the last year..        Current Medical Providers:  Patient Care Team:  Marko Lock MD as PCP - General (Family Medicine)        Smoking Status:  Social History     Tobacco Use   Smoking Status Former    Packs/day: 1.00    Years: 10.00    Additional pack years: 0.00    Total pack years: 10.00    Types: Cigarettes    Start date: 1/1/1958    Quit date: " 1969    Years since quittin.4   Smokeless Tobacco Never       Alcohol Consumption:  Social History     Substance and Sexual Activity   Alcohol Use Yes    Alcohol/week: 6.0 standard drinks of alcohol    Types: 6 Shots of liquor per week    Comment: weekly       Depression Screen:       2023     1:56 PM   PHQ-2/PHQ-9 Depression Screening   Little Interest or Pleasure in Doing Things 0-->not at all   Feeling Down, Depressed or Hopeless 0-->not at all   PHQ-9: Brief Depression Severity Measure Score 0       Health Habits and Functional and Cognitive Screenin/27/2023     1:55 PM   Functional & Cognitive Status   Do you have difficulty preparing food and eating? No   Do you have difficulty bathing yourself, getting dressed or grooming yourself? No   Do you have difficulty using the toilet? No   Do you have difficulty moving around from place to place? No   Do you have trouble with steps or getting out of a bed or a chair? No   Current Diet Unhealthy Diet   Dental Exam Up to date   Eye Exam Up to date   Exercise (times per week) 5 times per week   Current Exercises Include Treadmill   Do you need help using the phone?  No   Are you deaf or do you have serious difficulty hearing?  Yes   Do you need help to go to places out of walking distance? No   Do you need help shopping? No   Do you need help preparing meals?  No   Do you need help with housework?  No   Do you need help with laundry? No   Do you need help taking your medications? No   Do you need help managing money? No   Do you ever drive or ride in a car without wearing a seat belt? No   Have you felt unusual stress, anger or loneliness in the last month? No   Who do you live with? Spouse   If you need help, do you have trouble finding someone available to you? No   Have you been bothered in the last four weeks by sexual problems? No   Do you have difficulty concentrating, remembering or making decisions? No       Fall Risk Screen:  ERIN  Fall Risk Assessment was completed, and patient is at LOW risk for falls.Assessment completed on:11/27/2023    ACE III MINI        Does the patient have evidence of cognitive impairment? No    Aspirin use counseling: Taking ASA appropriately as indicated    Recent Lab Results:  CMP:  Lab Results   Component Value Date    BUN 11 11/20/2023    CREATININE 0.86 11/20/2023    EGFRIFNONA 110 09/01/2021    BCR 12.8 11/20/2023     11/20/2023    K 4.6 11/20/2023    CO2 27.9 11/20/2023    CALCIUM 9.4 11/20/2023    ALBUMIN 4.1 11/20/2023    BILITOT 0.7 11/20/2023    ALKPHOS 91 11/20/2023    AST 35 11/20/2023    ALT 51 (H) 11/20/2023     HbA1c:  Lab Results   Component Value Date    HGBA1C 7.10 (H) 11/20/2023    HGBA1C 7.3 08/23/2023     Microalbumin:  Lab Results   Component Value Date    MICROALBUR 1.4 11/20/2023     Lipid Panel  Lab Results   Component Value Date    CHOL 105 11/20/2023    TRIG 101 11/20/2023    HDL 40 11/20/2023    LDL 46 11/20/2023    AST 35 11/20/2023    ALT 51 (H) 11/20/2023       Visual Acuity:  No results found.    Age-appropriate Screening Schedule:  Refer to the list below for future screening recommendations based on patient's age, sex and/or medical conditions. Orders for these recommended tests are listed in the plan section. The patient has been provided with a written plan.    Health Maintenance   Topic Date Due    DIABETIC EYE EXAM  08/05/2023    BMI FOLLOWUP  02/22/2024    HEMOGLOBIN A1C  05/20/2024    LIPID PANEL  11/20/2024    URINE MICROALBUMIN  11/20/2024    ANNUAL WELLNESS VISIT  11/27/2024    COLORECTAL CANCER SCREENING  02/15/2026    TDAP/TD VACCINES (2 - Td or Tdap) 10/21/2032    COVID-19 Vaccine  Completed    INFLUENZA VACCINE  Completed    Pneumococcal Vaccine 65+  Completed    ZOSTER VACCINE  Completed        Subjective   History of Present Illness    Shady Newell is a 83 y.o. male who presents for a Subsequent Wellness Visit.    CHRONIC CONDITIONS    The following  portions of the patient's history were reviewed and updated as appropriate: allergies, current medications, past family history, past medical history, past social history, past surgical history, and problem list.    Outpatient Medications Prior to Visit   Medication Sig Dispense Refill    aspirin 81 MG EC tablet Take 1 tablet by mouth Daily. 1 tablet 0    atorvastatin (LIPITOR) 10 MG tablet TAKE 1 TABLET BY MOUTH EVERY DAY AT NIGHT 90 tablet 3    carvedilol (COREG) 6.25 MG tablet TAKE 1 TABLET BY MOUTH TWICE A  tablet 1    famotidine (PEPCID) 20 MG tablet TAKE 1 TABLET BY MOUTH EVERYDAY AT BEDTIME 90 tablet 3    finasteride (PROSCAR) 5 MG tablet Take 1 tablet by mouth Daily. 90 tablet 1    glimepiride (AMARYL) 2 MG tablet Take 1 tablet by mouth Every Morning Before Breakfast. 90 tablet 1    glucose blood test strip 1 each by Other route Daily As Needed (Blood sugar monitoring). Use as instructed 100 each 12    glucose monitor monitoring kit 1 each Daily As Needed (Blood sugar monitoring). 1 each 0    hydroCHLOROthiazide (HYDRODIURIL) 25 MG tablet Take 1 tablet by mouth Daily. 90 tablet 3    loratadine (CLARITIN) 10 MG tablet TAKE 1 TABLET BY MOUTH EVERY DAY AS NEEDED FOR ALLERGIES 90 tablet 1    Multiple Vitamins-Minerals (MULTIVITAMIN ADULT PO) Take 1 tablet by mouth Daily.      ramipril (ALTACE) 10 MG capsule TAKE 1 CAPSULE BY MOUTH TWICE A  capsule 1    tamsulosin (FLOMAX) 0.4 MG capsule 24 hr capsule Take 1 capsule by mouth Every Night. 90 capsule 1    nitroglycerin (NITROSTAT) 0.4 MG SL tablet Place 1 tablet under the tongue Every 5 (Five) Minutes As Needed for Chest Pain. Take no more than 3 doses in 15 minutes. 30 tablet 1    tolterodine LA (DETROL LA) 4 MG 24 hr capsule Take 1 capsule by mouth Daily. 90 capsule 1    clopidogrel (PLAVIX) 75 MG tablet Take 1 tablet by mouth Daily. (Patient not taking: Reported on 11/27/2023)      oxybutynin XL (DITROPAN-XL) 10 MG 24 hr tablet Take 1 tablet by  mouth Daily. (Patient not taking: Reported on 11/27/2023)      raNITIdine HCl (WAL-CATHERINE 75 PO) Take 1 tablet by mouth Daily. (Patient not taking: Reported on 11/27/2023)       No facility-administered medications prior to visit.       Patient Active Problem List   Diagnosis    Coronary artery disease involving native coronary artery of native heart without angina pectoris    Mixed hyperlipidemia    Essential hypertension    Severe obesity with body mass index (BMI) of 35.0 to 39.9 with serious comorbidity    Primary osteoarthritis involving multiple joints    Benign prostatic hyperplasia with weak urinary stream    Gastroesophageal reflux disease without esophagitis    Controlled type 2 diabetes mellitus without complication, without long-term current use of insulin    Seasonal allergies    Systolic murmur    Full thickness rotator cuff tear    Elevated transaminase level    Glaucoma suspect of both eyes    Nonexudative age-related macular degeneration, bilateral, early dry stage    Meibomian gland dysfunction (MGD)    History of COVID-19    Urge incontinence of urine       Advance Care Planning:  ACP discussion was held with the patient during this visit. Patient has an advance directive in EMR which is still valid.  Updated POA to be scanned    Identification of Risk Factors:  Risk factors include: Advance Directive Discussion  Obesity/Overweight .    Review of Systems    Compared to one year ago, the patient feels his physical health is the same.  Compared to one year ago, the patient feels his mental health is the same.    Objective     Physical Exam  Vitals reviewed.   Constitutional:       Appearance: Normal appearance.   HENT:      Head: Normocephalic and atraumatic.      Right Ear: Tympanic membrane, ear canal and external ear normal. There is no impacted cerumen.      Left Ear: Tympanic membrane, ear canal and external ear normal. There is no impacted cerumen.      Nose: Nose normal. No congestion.       "Mouth/Throat:      Mouth: Mucous membranes are moist.   Eyes:      Extraocular Movements: Extraocular movements intact.      Conjunctiva/sclera: Conjunctivae normal.   Cardiovascular:      Rate and Rhythm: Normal rate and regular rhythm.      Heart sounds: Normal heart sounds. No murmur heard.  Pulmonary:      Effort: Pulmonary effort is normal.      Breath sounds: Normal breath sounds.   Abdominal:      General: There is no distension.      Palpations: Abdomen is soft. There is no mass.      Tenderness: There is no abdominal tenderness.   Musculoskeletal:      Cervical back: Neck supple.      Right lower leg: No edema.      Left lower leg: No edema.   Skin:     General: Skin is warm and dry.   Neurological:      Mental Status: He is alert and oriented to person, place, and time. Mental status is at baseline.   Psychiatric:         Behavior: Behavior normal.         Thought Content: Thought content normal.          Procedures     Vitals:    11/27/23 1346   BP: 134/82   BP Location: Left arm   Patient Position: Sitting   Cuff Size: Adult   Pulse: 60   Temp: 99.3 °F (37.4 °C)   TempSrc: Temporal   Weight: 110 kg (242 lb 12.8 oz)   Height: 175 cm (68.9\")              Assessment & Plan     1. Encounter for subsequent annual wellness visit (AWV) in Medicare patient      2. Controlled type 2 diabetes mellitus without complication, without long-term current use of insulin  Hemoglobin A1C   Date Value Ref Range Status   11/20/2023 7.10 (H) 4.80 - 5.60 % Final   08/23/2023 7.3 % Final   05/22/2023 6.8 % Final   02/22/2023 6.9 % Final   11/10/2022 6.90 (H) 4.80 - 5.60 % Final   07/26/2022 6.70 (H) 4.80 - 5.60 % Final   Improved glycemic control.  Currently at goal with A1c below 7.5.  Patient is due for eye exam, he has upcoming appointment with ophthalmology within a couple weeks.    3. Coronary artery disease involving native coronary artery of native heart without angina pectoris  Patient has not used nitroglycerin for " years, but always carries it with him.  His old medication has  and he is requesting refill.  - nitroglycerin (NITROSTAT) 0.4 MG SL tablet; Place 1 tablet under the tongue Every 5 (Five) Minutes As Needed for Chest Pain. Take no more than 3 doses in 15 minutes.  Dispense: 30 tablet; Refill: 1    4. Essential hypertension  BP Readings from Last 3 Encounters:   23 134/82   23 124/68   23 130/60   Blood pressure borderline, but acceptable below 140/90.  We will continue to monitor.  Follow-up in 4 months.    5. Urge incontinence of urine  Patient reporting benefit with continued use, no more leakage of urine.  - tolterodine LA (DETROL LA) 4 MG 24 hr capsule; Take 1 capsule by mouth Daily.  Dispense: 90 capsule; Refill: 1    6. Benign prostatic hyperplasia with weak urinary stream  Overall stable.  Continue on tamsulosin.    7. Severe obesity with body mass index (BMI) of 35.0 to 39.9 with serious comorbidity    Patient Self-Management and Personalized Health Advice  The patient has been provided with information about: exercise and preventive services including:   Annual Wellness Visit (AWV).    Outpatient Encounter Medications as of 2023   Medication Sig Dispense Refill    aspirin 81 MG EC tablet Take 1 tablet by mouth Daily. 1 tablet 0    atorvastatin (LIPITOR) 10 MG tablet TAKE 1 TABLET BY MOUTH EVERY DAY AT NIGHT 90 tablet 3    carvedilol (COREG) 6.25 MG tablet TAKE 1 TABLET BY MOUTH TWICE A  tablet 1    famotidine (PEPCID) 20 MG tablet TAKE 1 TABLET BY MOUTH EVERYDAY AT BEDTIME 90 tablet 3    finasteride (PROSCAR) 5 MG tablet Take 1 tablet by mouth Daily. 90 tablet 1    glimepiride (AMARYL) 2 MG tablet Take 1 tablet by mouth Every Morning Before Breakfast. 90 tablet 1    glucose blood test strip 1 each by Other route Daily As Needed (Blood sugar monitoring). Use as instructed 100 each 12    glucose monitor monitoring kit 1 each Daily As Needed (Blood sugar monitoring). 1 each  0    hydroCHLOROthiazide (HYDRODIURIL) 25 MG tablet Take 1 tablet by mouth Daily. 90 tablet 3    loratadine (CLARITIN) 10 MG tablet TAKE 1 TABLET BY MOUTH EVERY DAY AS NEEDED FOR ALLERGIES 90 tablet 1    Multiple Vitamins-Minerals (MULTIVITAMIN ADULT PO) Take 1 tablet by mouth Daily.      nitroglycerin (NITROSTAT) 0.4 MG SL tablet Place 1 tablet under the tongue Every 5 (Five) Minutes As Needed for Chest Pain. Take no more than 3 doses in 15 minutes. 30 tablet 1    ramipril (ALTACE) 10 MG capsule TAKE 1 CAPSULE BY MOUTH TWICE A  capsule 1    tamsulosin (FLOMAX) 0.4 MG capsule 24 hr capsule Take 1 capsule by mouth Every Night. 90 capsule 1    tolterodine LA (DETROL LA) 4 MG 24 hr capsule Take 1 capsule by mouth Daily. 90 capsule 1    [DISCONTINUED] nitroglycerin (NITROSTAT) 0.4 MG SL tablet Place 1 tablet under the tongue Every 5 (Five) Minutes As Needed for Chest Pain. Take no more than 3 doses in 15 minutes. 30 tablet 1    [DISCONTINUED] tolterodine LA (DETROL LA) 4 MG 24 hr capsule Take 1 capsule by mouth Daily. 90 capsule 1    [DISCONTINUED] clopidogrel (PLAVIX) 75 MG tablet Take 1 tablet by mouth Daily. (Patient not taking: Reported on 11/27/2023)      [DISCONTINUED] oxybutynin XL (DITROPAN-XL) 10 MG 24 hr tablet Take 1 tablet by mouth Daily. (Patient not taking: Reported on 11/27/2023)      [DISCONTINUED] raNITIdine HCl (WAL-CATHERINE 75 PO) Take 1 tablet by mouth Daily. (Patient not taking: Reported on 11/27/2023)       No facility-administered encounter medications on file as of 11/27/2023.       Reviewed use of high risk medication in the elderly: yes  Reviewed for potential of harmful drug interactions in the elderly: yes    Follow Up:  Return in about 17 weeks (around 3/25/2024) for Recheck.     Future Appointments         Provider Department Center    3/27/2024 1:45 PM Marko Lock MD [facility] INTERNAL MEDICINE [place]    9/13/2024 1:45 PM Markus Zhu MD [facility]  GROUP CARDIOLOGY [place]              There are no Patient Instructions on file for this visit.    An After Visit Summary and PPPS with all of these plans were given to the patient.      Marko Lock MD

## 2023-12-03 DIAGNOSIS — E11.9 CONTROLLED TYPE 2 DIABETES MELLITUS WITHOUT COMPLICATION, WITHOUT LONG-TERM CURRENT USE OF INSULIN: ICD-10-CM

## 2023-12-04 RX ORDER — GLIMEPIRIDE 2 MG/1
2 TABLET ORAL
Qty: 90 TABLET | Refills: 1 | Status: SHIPPED | OUTPATIENT
Start: 2023-12-04

## 2023-12-04 RX ORDER — GLIMEPIRIDE 1 MG/1
TABLET ORAL
Qty: 90 TABLET | Refills: 1 | OUTPATIENT
Start: 2023-12-04

## 2023-12-16 ENCOUNTER — APPOINTMENT (OUTPATIENT)
Dept: GENERAL RADIOLOGY | Facility: HOSPITAL | Age: 83
End: 2023-12-16
Payer: MEDICARE

## 2023-12-16 ENCOUNTER — APPOINTMENT (OUTPATIENT)
Dept: CT IMAGING | Facility: HOSPITAL | Age: 83
End: 2023-12-16
Payer: MEDICARE

## 2023-12-16 ENCOUNTER — HOSPITAL ENCOUNTER (OUTPATIENT)
Facility: HOSPITAL | Age: 83
Setting detail: OBSERVATION
Discharge: HOME | End: 2023-12-17
Attending: EMERGENCY MEDICINE | Admitting: EMERGENCY MEDICINE
Payer: MEDICARE

## 2023-12-16 ENCOUNTER — APPOINTMENT (OUTPATIENT)
Dept: MRI IMAGING | Facility: HOSPITAL | Age: 83
End: 2023-12-16
Payer: MEDICARE

## 2023-12-16 DIAGNOSIS — M15.9 PRIMARY OSTEOARTHRITIS INVOLVING MULTIPLE JOINTS: ICD-10-CM

## 2023-12-16 DIAGNOSIS — R39.12 BENIGN PROSTATIC HYPERPLASIA WITH WEAK URINARY STREAM: ICD-10-CM

## 2023-12-16 DIAGNOSIS — K21.9 GASTROESOPHAGEAL REFLUX DISEASE WITHOUT ESOPHAGITIS: ICD-10-CM

## 2023-12-16 DIAGNOSIS — I10 ESSENTIAL HYPERTENSION: Chronic | ICD-10-CM

## 2023-12-16 DIAGNOSIS — R42 DIZZINESS: ICD-10-CM

## 2023-12-16 DIAGNOSIS — E78.2 MIXED HYPERLIPIDEMIA: Chronic | ICD-10-CM

## 2023-12-16 DIAGNOSIS — N40.1 BENIGN PROSTATIC HYPERPLASIA WITH WEAK URINARY STREAM: ICD-10-CM

## 2023-12-16 DIAGNOSIS — E11.9 CONTROLLED TYPE 2 DIABETES MELLITUS WITHOUT COMPLICATION, WITHOUT LONG-TERM CURRENT USE OF INSULIN: Chronic | ICD-10-CM

## 2023-12-16 DIAGNOSIS — I25.10 CORONARY ARTERY DISEASE INVOLVING NATIVE CORONARY ARTERY OF NATIVE HEART WITHOUT ANGINA PECTORIS: Chronic | ICD-10-CM

## 2023-12-16 DIAGNOSIS — I10 POORLY-CONTROLLED HYPERTENSION: Primary | ICD-10-CM

## 2023-12-16 DIAGNOSIS — E66.01 SEVERE OBESITY WITH BODY MASS INDEX (BMI) OF 35.0 TO 39.9 WITH SERIOUS COMORBIDITY: ICD-10-CM

## 2023-12-16 DIAGNOSIS — R42 LIGHTHEADED: ICD-10-CM

## 2023-12-16 LAB
ALBUMIN SERPL-MCNC: 4.3 G/DL (ref 3.5–5.2)
ALBUMIN/GLOB SERPL: 2 G/DL
ALP SERPL-CCNC: 85 U/L (ref 39–117)
ALT SERPL W P-5'-P-CCNC: 42 U/L (ref 1–41)
ANION GAP SERPL CALCULATED.3IONS-SCNC: 10.6 MMOL/L (ref 5–15)
AST SERPL-CCNC: 25 U/L (ref 1–40)
BASOPHILS # BLD AUTO: 0.05 10*3/MM3 (ref 0–0.2)
BASOPHILS NFR BLD AUTO: 0.6 % (ref 0–1.5)
BILIRUB SERPL-MCNC: 0.7 MG/DL (ref 0–1.2)
BILIRUB UR QL STRIP: NEGATIVE
BUN SERPL-MCNC: 11 MG/DL (ref 8–23)
BUN/CREAT SERPL: 14.3 (ref 7–25)
CALCIUM SPEC-SCNC: 9.2 MG/DL (ref 8.6–10.5)
CHLORIDE SERPL-SCNC: 101 MMOL/L (ref 98–107)
CLARITY UR: CLEAR
CO2 SERPL-SCNC: 27.4 MMOL/L (ref 22–29)
COLOR UR: YELLOW
CREAT SERPL-MCNC: 0.77 MG/DL (ref 0.76–1.27)
DEPRECATED RDW RBC AUTO: 42.7 FL (ref 37–54)
EGFRCR SERPLBLD CKD-EPI 2021: 88.8 ML/MIN/1.73
EOSINOPHIL # BLD AUTO: 0.17 10*3/MM3 (ref 0–0.4)
EOSINOPHIL NFR BLD AUTO: 1.9 % (ref 0.3–6.2)
ERYTHROCYTE [DISTWIDTH] IN BLOOD BY AUTOMATED COUNT: 12.4 % (ref 12.3–15.4)
GLOBULIN UR ELPH-MCNC: 2.2 GM/DL
GLUCOSE BLDC GLUCOMTR-MCNC: 121 MG/DL (ref 70–130)
GLUCOSE BLDC GLUCOMTR-MCNC: 130 MG/DL (ref 70–130)
GLUCOSE BLDC GLUCOMTR-MCNC: 97 MG/DL (ref 70–130)
GLUCOSE SERPL-MCNC: 138 MG/DL (ref 65–99)
GLUCOSE UR STRIP-MCNC: NEGATIVE MG/DL
HCT VFR BLD AUTO: 47.1 % (ref 37.5–51)
HGB BLD-MCNC: 16 G/DL (ref 13–17.7)
HGB UR QL STRIP.AUTO: NEGATIVE
HOLD SPECIMEN: NORMAL
HOLD SPECIMEN: NORMAL
IMM GRANULOCYTES # BLD AUTO: 0.07 10*3/MM3 (ref 0–0.05)
IMM GRANULOCYTES NFR BLD AUTO: 0.8 % (ref 0–0.5)
KETONES UR QL STRIP: NEGATIVE
LEUKOCYTE ESTERASE UR QL STRIP.AUTO: NEGATIVE
LYMPHOCYTES # BLD AUTO: 1.46 10*3/MM3 (ref 0.7–3.1)
LYMPHOCYTES NFR BLD AUTO: 16.4 % (ref 19.6–45.3)
MAGNESIUM SERPL-MCNC: 2.1 MG/DL (ref 1.6–2.4)
MCH RBC QN AUTO: 32.2 PG (ref 26.6–33)
MCHC RBC AUTO-ENTMCNC: 34 G/DL (ref 31.5–35.7)
MCV RBC AUTO: 94.8 FL (ref 79–97)
MONOCYTES # BLD AUTO: 0.77 10*3/MM3 (ref 0.1–0.9)
MONOCYTES NFR BLD AUTO: 8.6 % (ref 5–12)
NEUTROPHILS NFR BLD AUTO: 6.4 10*3/MM3 (ref 1.7–7)
NEUTROPHILS NFR BLD AUTO: 71.7 % (ref 42.7–76)
NITRITE UR QL STRIP: NEGATIVE
NRBC BLD AUTO-RTO: 0 /100 WBC (ref 0–0.2)
PH UR STRIP.AUTO: 6.5 [PH] (ref 5–8)
PLATELET # BLD AUTO: 202 10*3/MM3 (ref 140–450)
PMV BLD AUTO: 10.1 FL (ref 6–12)
POTASSIUM SERPL-SCNC: 4 MMOL/L (ref 3.5–5.2)
PROT SERPL-MCNC: 6.5 G/DL (ref 6–8.5)
PROT UR QL STRIP: NEGATIVE
RBC # BLD AUTO: 4.97 10*6/MM3 (ref 4.14–5.8)
SODIUM SERPL-SCNC: 139 MMOL/L (ref 136–145)
SP GR UR STRIP: 1.01 (ref 1–1.03)
TROPONIN T SERPL HS-MCNC: 18 NG/L
TROPONIN T SERPL HS-MCNC: 19 NG/L
UROBILINOGEN UR QL STRIP: NORMAL
WBC NRBC COR # BLD AUTO: 8.92 10*3/MM3 (ref 3.4–10.8)
WHOLE BLOOD HOLD COAG: NORMAL
WHOLE BLOOD HOLD SPECIMEN: NORMAL

## 2023-12-16 PROCEDURE — 70551 MRI BRAIN STEM W/O DYE: CPT

## 2023-12-16 PROCEDURE — 99284 EMERGENCY DEPT VISIT MOD MDM: CPT

## 2023-12-16 PROCEDURE — 82948 REAGENT STRIP/BLOOD GLUCOSE: CPT

## 2023-12-16 PROCEDURE — 71045 X-RAY EXAM CHEST 1 VIEW: CPT

## 2023-12-16 PROCEDURE — 84484 ASSAY OF TROPONIN QUANT: CPT

## 2023-12-16 PROCEDURE — 93005 ELECTROCARDIOGRAM TRACING: CPT | Performed by: EMERGENCY MEDICINE

## 2023-12-16 PROCEDURE — 80053 COMPREHEN METABOLIC PANEL: CPT

## 2023-12-16 PROCEDURE — 70450 CT HEAD/BRAIN W/O DYE: CPT

## 2023-12-16 PROCEDURE — G0378 HOSPITAL OBSERVATION PER HR: HCPCS

## 2023-12-16 PROCEDURE — 85025 COMPLETE CBC W/AUTO DIFF WBC: CPT

## 2023-12-16 PROCEDURE — 36415 COLL VENOUS BLD VENIPUNCTURE: CPT

## 2023-12-16 PROCEDURE — 93010 ELECTROCARDIOGRAM REPORT: CPT | Performed by: INTERNAL MEDICINE

## 2023-12-16 PROCEDURE — 93005 ELECTROCARDIOGRAM TRACING: CPT

## 2023-12-16 PROCEDURE — 83735 ASSAY OF MAGNESIUM: CPT

## 2023-12-16 PROCEDURE — 84484 ASSAY OF TROPONIN QUANT: CPT | Performed by: STUDENT IN AN ORGANIZED HEALTH CARE EDUCATION/TRAINING PROGRAM

## 2023-12-16 PROCEDURE — 96374 THER/PROPH/DIAG INJ IV PUSH: CPT

## 2023-12-16 PROCEDURE — 81003 URINALYSIS AUTO W/O SCOPE: CPT | Performed by: EMERGENCY MEDICINE

## 2023-12-16 RX ORDER — BISACODYL 5 MG/1
5 TABLET, DELAYED RELEASE ORAL DAILY PRN
Status: DISCONTINUED | OUTPATIENT
Start: 2023-12-16 | End: 2023-12-17 | Stop reason: HOSPADM

## 2023-12-16 RX ORDER — NICOTINE POLACRILEX 4 MG
15 LOZENGE BUCCAL
Status: DISCONTINUED | OUTPATIENT
Start: 2023-12-16 | End: 2023-12-17 | Stop reason: HOSPADM

## 2023-12-16 RX ORDER — HYDROCHLOROTHIAZIDE 25 MG/1
25 TABLET ORAL DAILY
Status: DISCONTINUED | OUTPATIENT
Start: 2023-12-16 | End: 2023-12-17 | Stop reason: HOSPADM

## 2023-12-16 RX ORDER — IBUPROFEN 600 MG/1
1 TABLET ORAL
Status: DISCONTINUED | OUTPATIENT
Start: 2023-12-16 | End: 2023-12-17 | Stop reason: HOSPADM

## 2023-12-16 RX ORDER — AMOXICILLIN 250 MG
2 CAPSULE ORAL 2 TIMES DAILY
Status: DISCONTINUED | OUTPATIENT
Start: 2023-12-16 | End: 2023-12-17 | Stop reason: HOSPADM

## 2023-12-16 RX ORDER — NITROGLYCERIN 0.4 MG/1
0.4 TABLET SUBLINGUAL
Status: DISCONTINUED | OUTPATIENT
Start: 2023-12-16 | End: 2023-12-17 | Stop reason: HOSPADM

## 2023-12-16 RX ORDER — ONDANSETRON 2 MG/ML
4 INJECTION INTRAMUSCULAR; INTRAVENOUS EVERY 6 HOURS PRN
Status: DISCONTINUED | OUTPATIENT
Start: 2023-12-16 | End: 2023-12-17 | Stop reason: HOSPADM

## 2023-12-16 RX ORDER — FAMOTIDINE 20 MG/1
20 TABLET, FILM COATED ORAL NIGHTLY
Status: DISCONTINUED | OUTPATIENT
Start: 2023-12-16 | End: 2023-12-17 | Stop reason: HOSPADM

## 2023-12-16 RX ORDER — ONDANSETRON 4 MG/1
4 TABLET, FILM COATED ORAL EVERY 6 HOURS PRN
Status: DISCONTINUED | OUTPATIENT
Start: 2023-12-16 | End: 2023-12-17 | Stop reason: HOSPADM

## 2023-12-16 RX ORDER — SODIUM CHLORIDE 9 MG/ML
40 INJECTION, SOLUTION INTRAVENOUS AS NEEDED
Status: DISCONTINUED | OUTPATIENT
Start: 2023-12-16 | End: 2023-12-17 | Stop reason: HOSPADM

## 2023-12-16 RX ORDER — SODIUM CHLORIDE 0.9 % (FLUSH) 0.9 %
10 SYRINGE (ML) INJECTION EVERY 12 HOURS SCHEDULED
Status: DISCONTINUED | OUTPATIENT
Start: 2023-12-16 | End: 2023-12-17 | Stop reason: HOSPADM

## 2023-12-16 RX ORDER — FINASTERIDE 5 MG/1
5 TABLET, FILM COATED ORAL DAILY
Status: DISCONTINUED | OUTPATIENT
Start: 2023-12-16 | End: 2023-12-17 | Stop reason: HOSPADM

## 2023-12-16 RX ORDER — INSULIN LISPRO 100 [IU]/ML
2-9 INJECTION, SOLUTION INTRAVENOUS; SUBCUTANEOUS
Status: DISCONTINUED | OUTPATIENT
Start: 2023-12-16 | End: 2023-12-17 | Stop reason: HOSPADM

## 2023-12-16 RX ORDER — SODIUM CHLORIDE 0.9 % (FLUSH) 0.9 %
10 SYRINGE (ML) INJECTION AS NEEDED
Status: DISCONTINUED | OUTPATIENT
Start: 2023-12-16 | End: 2023-12-17 | Stop reason: HOSPADM

## 2023-12-16 RX ORDER — LISINOPRIL 20 MG/1
40 TABLET ORAL EVERY 12 HOURS SCHEDULED
Status: DISCONTINUED | OUTPATIENT
Start: 2023-12-16 | End: 2023-12-17 | Stop reason: HOSPADM

## 2023-12-16 RX ORDER — ATORVASTATIN CALCIUM 10 MG/1
10 TABLET, FILM COATED ORAL NIGHTLY
Status: DISCONTINUED | OUTPATIENT
Start: 2023-12-16 | End: 2023-12-17 | Stop reason: HOSPADM

## 2023-12-16 RX ORDER — MULTIPLE VITAMINS W/ MINERALS TAB 9MG-400MCG
1 TAB ORAL DAILY
Status: DISCONTINUED | OUTPATIENT
Start: 2023-12-16 | End: 2023-12-17 | Stop reason: HOSPADM

## 2023-12-16 RX ORDER — CARVEDILOL 6.25 MG/1
6.25 TABLET ORAL 2 TIMES DAILY
Status: DISCONTINUED | OUTPATIENT
Start: 2023-12-16 | End: 2023-12-17 | Stop reason: HOSPADM

## 2023-12-16 RX ORDER — POLYETHYLENE GLYCOL 3350 17 G/17G
17 POWDER, FOR SOLUTION ORAL DAILY PRN
Status: DISCONTINUED | OUTPATIENT
Start: 2023-12-16 | End: 2023-12-17 | Stop reason: HOSPADM

## 2023-12-16 RX ORDER — DEXTROSE MONOHYDRATE 25 G/50ML
25 INJECTION, SOLUTION INTRAVENOUS
Status: DISCONTINUED | OUTPATIENT
Start: 2023-12-16 | End: 2023-12-17 | Stop reason: HOSPADM

## 2023-12-16 RX ORDER — ASPIRIN 81 MG/1
81 TABLET ORAL DAILY
Status: DISCONTINUED | OUTPATIENT
Start: 2023-12-16 | End: 2023-12-17 | Stop reason: HOSPADM

## 2023-12-16 RX ORDER — ACETAMINOPHEN 325 MG/1
650 TABLET ORAL EVERY 4 HOURS PRN
Status: DISCONTINUED | OUTPATIENT
Start: 2023-12-16 | End: 2023-12-17 | Stop reason: HOSPADM

## 2023-12-16 RX ORDER — TAMSULOSIN HYDROCHLORIDE 0.4 MG/1
0.4 CAPSULE ORAL NIGHTLY
Status: DISCONTINUED | OUTPATIENT
Start: 2023-12-16 | End: 2023-12-17 | Stop reason: HOSPADM

## 2023-12-16 RX ORDER — BISACODYL 10 MG
10 SUPPOSITORY, RECTAL RECTAL DAILY PRN
Status: DISCONTINUED | OUTPATIENT
Start: 2023-12-16 | End: 2023-12-17 | Stop reason: HOSPADM

## 2023-12-16 RX ADMIN — TAMSULOSIN HYDROCHLORIDE 0.4 MG: 0.4 CAPSULE ORAL at 20:15

## 2023-12-16 RX ADMIN — LISINOPRIL 40 MG: 20 TABLET ORAL at 20:17

## 2023-12-16 RX ADMIN — METOPROLOL TARTRATE 5 MG: 1 INJECTION, SOLUTION INTRAVENOUS at 09:13

## 2023-12-16 RX ADMIN — CARVEDILOL 6.25 MG: 6.25 TABLET, FILM COATED ORAL at 20:15

## 2023-12-16 RX ADMIN — Medication 10 ML: at 14:33

## 2023-12-16 RX ADMIN — Medication 10 ML: at 20:19

## 2023-12-16 RX ADMIN — FAMOTIDINE 20 MG: 20 TABLET ORAL at 20:15

## 2023-12-16 RX ADMIN — ATORVASTATIN CALCIUM 10 MG: 10 TABLET, FILM COATED ORAL at 20:15

## 2023-12-16 NOTE — H&P
[facility]   HISTORY AND PHYSICAL    Patient Name: Shady Newell  : 1940  MRN: [id number]  Primary Care Physician:  Marko Lock MD  Date of admission: 2023    Subjective   Subjective     Chief Complaint:   Chief Complaint   Patient presents with    Hypertension    Dizziness         HPI:    Shady Newell is a 83 y.o. male with a history of coronary artery disease, CHF, hypertension, hyperlipidemia, type 2 diabetes who presents to [facility] ER with lightheadedness/dizziness.  Patient states this morning he was getting out of bed to go to the bathroom and started to felt dizzy.  He states that he felt like the room was moving a bit and felt lightheaded.  He reports he sat down for a few minutes and the dizziness resolved.  He states throughout the day he continued to have a couple more episodes that came on when he changed position but did not last more than a few minutes after he was able to sit down and rest.  He denies any headache or vision changes such as blurred or double vision.  He denies numbness and tingling or focal weakness.  Denies difficulty with his speech or swallowing.  He denies chest pain or chest discomfort.  Denies shortness of breath and palpitations.  Denies nausea and vomiting.  Denies abdominal pain.  Denies fevers and chills.    Review of Systems   All systems were reviewed and negative except for: what is mentioned above in the HPI.     Personal History     Past Medical History:   Diagnosis Date    BPH (benign prostatic hyperplasia)     CHF (congestive heart failure) ?    Controlled type 2 diabetes mellitus without complication, without long-term current use of insulin 2021 A1c 6.4  A1c 7.34    Coronary artery disease involving native coronary artery of native heart without angina pectoris 2016    Dyslipidemia     Elevated transaminase level     Essential hypertension     GERD (gastroesophageal reflux disease)      History of coronary angioplasty with insertion of stent 2006    Stent 2006 and 2016    Mixed hyperlipidemia     Obesity     Primary osteoarthritis involving multiple joints 07/06/2016    Primary osteoarthritis of right knee     Systolic murmur 03/04/2021    3/2021: Grade 2/6 R IC space       Past Surgical History:   Procedure Laterality Date    CARDIAC CATHETERIZATION N/A 08/28/2017    Procedure: Left Heart Cath;  Surgeon: Nash Oliver MD;  Location: [facility] CATH INVASIVE LOCATION;  Service:     CATARACT EXTRACTION      CORONARY ANGIOPLASTY WITH STENT PLACEMENT  2004    CORONARY STENT PLACEMENT  2006    HERNIA REPAIR  1988    KNEE ARTHROSCOPY      ROTATOR CUFF REPAIR Left 1990    ROTATOR CUFF REPAIR Right 06/2015    SHOULDER SURGERY      VASECTOMY         Family History: family history includes Arthritis in his mother; Cancer in his father and another family member; Heart attack in his mother; Heart disease in his brother, father, and mother; Hypertension in his father, mother, and another family member; Osteoarthritis in his mother; Stroke in his father. Otherwise pertinent FHx was reviewed and not pertinent to current issue.    Social History:  reports that he quit smoking about 54 years ago. His smoking use included cigarettes. He started smoking about 66 years ago. He has a 10.00 pack-year smoking history. He has never used smokeless tobacco. He reports current alcohol use of about 6.0 standard drinks of alcohol per week. He reports that he does not use drugs.    Home Medications:  aspirin, atorvastatin, carvedilol, famotidine, finasteride, glimepiride, glucose blood, glucose monitor, hydroCHLOROthiazide, loratadine, multivitamin with minerals, nitroglycerin, ramipril, tamsulosin, and tolterodine LA    Allergies:  Allergies   Allergen Reactions    Other      Dual antiplatelet therapy, spontaneous bruising as of June 2010       Objective   Objective     Vitals:   Temp:  [97 °F (36.1 °C)-98.1 °F (36.7 °C)] 98.1 °F  (36.7 °C)  Heart Rate:  [67-82] 76  Resp:  [18] 18  BP: (148-211)/() 156/86  Physical Exam    Constitutional: 83-year-old male in no acute distress on room air   Eyes: PERRLA, sclerae anicteric, no conjunctival injection, EOMI, no nystagmus   HENT: NCAT, mucous membranes moist   Neck: Supple, no thyromegaly, no lymphadenopathy, trachea midline   Respiratory: Clear to auscultation bilaterally, nonlabored respirations    Cardiovascular: RRR, no murmurs, rubs, or gallops, palpable pedal pulses bilaterally   Gastrointestinal: Positive bowel sounds, soft, nontender, nondistended   Musculoskeletal: No bilateral ankle edema, no clubbing or cyanosis to extremities   Psychiatric: Appropriate affect, cooperative   Neurologic: Oriented x 3, strength symmetric in all extremities, Cranial Nerves grossly intact to confrontation, speech clear, no facial droop.  Finger-to-nose exam normal.  Equal  strength bilaterally.   Skin: No rashes     Result Review    Result Review:  I have personally reviewed the results from the time of this admission to 12/16/2023 13:51 EST and agree with these findings:  [x]  Laboratory list / accordion  []  Microbiology  [x]  Radiology  [x]  EKG/Telemetry   []  Cardiology/Vascular   []  Pathology  [x]  Old records  []  Other:  Most notable findings include:     CT Head Without Contrast    Result Date: 12/16/2023  CT HEAD WO CONTRAST-  INDICATIONS: Lightheaded  TECHNIQUE: Radiation dose reduction techniques were utilized, including automated exposure control and exposure modulation based on body size. Noncontrast head CT  COMPARISON: None available  FINDINGS:    No acute intracranial hemorrhage, midline shift or mass effect. No acute territorial infarct is identified.  Arterial calcifications are seen at the base of the brain.  Ventricles, cisterns, cerebral sulci are unremarkable for patient age.  Minimal paranasal sinus mucosal thickening is present. The visualized paranasal sinuses, orbits,  mastoid air cells are otherwise unremarkable.        No acute intracranial hemorrhage or hydrocephalus. If there is further clinical concern, MRI could be considered for further evaluation.  This report was finalized on 12/16/2023 10:32 AM by Dr. Neil Rodríguez M.D on Workstation: [employer]      XR Chest 1 View    Result Date: 12/16/2023  XR CHEST 1 VW-  HISTORY: Male who is 83 years-old, weakness  TECHNIQUE: Frontal views of the chest  COMPARISON: None available  FINDINGS: The heart size is normal. Aorta is calcified. Pulmonary vasculature is unremarkable. No focal pulmonary consolidation, pleural effusion, or pneumothorax. No acute osseous process.      No focal pulmonary consolidation. Follow-up/further evaluation can be obtained as clinical indications persist.  This report was finalized on 12/16/2023 8:27 AM by Dr. Neil Rodríguez M.D on Workstation: [employer]      Automated Visual Field, Extended - OU - Both Eyes    Result Date: 12/1/2023  This result has an attachment that is not available. Right Eye Threshold was 24-2. Strategy was Full Threshold. Reliability was borderline. Progression has been stable. Foveal threshold was normal. Findings include normal observations, non-specific defects. Left Eye Reliability was borderline. Progression has been stable. Foveal threshold was normal. Findings include normal observations, non-specific defects.          Assessment & Plan   Assessment / Plan     Brief Patient Summary:  Shady Newell is a 83 y.o. male who be admitted the observation unit for further evaluation of dizziness.  In the ER, head CT without showed no acute intracranial findings. Chest x-ray showed no focal pulmonary consolidation.  EKG sowed's concern for an irregular rhythm however appears to be normal sinus rhythm with PAC, no acute ischemia.  Initial high-sensitivity troponin noted 18.  His blood pressure on arrival was 211/103.  He received 5 mg IV Lopressor with improvement.     Plan  to obtain MRI of the brain as well as consultation to cardiology.    Active Hospital Problems:  Active Hospital Problems    Diagnosis     **Dizziness      Plan:     Dizziness  Hypertensive urgency  -CT head negative acute  -Troponin 18, trend  -EKG shows what appears to be sinus rhythm with PAC, no acute ischemia  -Patient received 5 mg IV Lopressor in ER  -MRI of the brain ordered  -If MRI abnormal, consult neurology  -Cardiology consulted  -Continue home carvedilol, hydrochlorothiazide, lisinopril  -Continue aspirin and statin  -Neurochecks every 4 hours  -Monitor vital signs every 4 hours  -Telemetry monitoring    Type 2 diabetes  -Hold home glimepiride  -Sliding scale insulin Accu-Cheks with meals and at bedtime      DVT prophylaxis:  Mechanical DVT prophylaxis orders are present.    CODE STATUS:    Code Status (Patient has no pulse and is not breathing): CPR (Attempt to Resuscitate)  Medical Interventions (Patient has pulse or is breathing): Full Support    Admission Status:  I believe this patient meets observation status.    78 minutes have been spent by [facility] providers in the care of this patient while under observation status.      Appropriate PPE worn during patient encounter.  Hand hygeine performed before and after seeing the patient.      Electronically signed by Noelle Quispe PA-C, 12/16/23, 12:02 PM EST.

## 2023-12-16 NOTE — ED PROVIDER NOTES
" EMERGENCY DEPARTMENT ENCOUNTER    Room Number:  03/03  Date of encounter:  12/16/2023  PCP: Marko Lock MD  Historian: Patient/wife  Full history not obtainable due to: None    HPI:  Chief Complaint: Lightheaded    Context: Shady Newell is a 83 y.o. male with a PMH significant for CAD, hypertension, hyperlipidemia, systolic murmur, type 2 diabetes, GERD who presents to the ED c/o lightheadedness and dizziness that began this morning.  The patient reports that he woke up this morning to go to the restroom and felt \"spinning\" and felt as though he might faint.  Symptoms subsided after a few minutes and allowed him to stand out of bed before becoming dizzy once again.  Symptoms seem to alleviate after staying in 1 position for a few minutes but seem to exacerbate once again with movement.  He denies chest pain, shortness of breath, syncope, nausea, vomiting.  Currently asymptomatic.  No history of similar symptoms.      MEDICAL RECORD REVIEW:    Upon review of the medical record it appears the patient was evaluated in the office with ophthalmology for type 2 diabetes on December 1, 2023.  The patient had a normal vitamin B12 level on 11/20/2023 and a normal lipid panel on that date.    PAST MEDICAL HISTORY    Active Ambulatory Problems     Diagnosis Date Noted    Coronary artery disease involving native coronary artery of native heart without angina pectoris 07/06/2016    Mixed hyperlipidemia 07/06/2016    Essential hypertension 07/06/2016    Severe obesity with body mass index (BMI) of 35.0 to 39.9 with serious comorbidity 07/06/2016    Primary osteoarthritis involving multiple joints 07/06/2016    Benign prostatic hyperplasia with weak urinary stream 07/06/2016    Gastroesophageal reflux disease without esophagitis 07/06/2016    Controlled type 2 diabetes mellitus without complication, without long-term current use of insulin 03/04/2021    Seasonal allergies 03/04/2021    Systolic murmur 03/04/2021    " Full thickness rotator cuff tear 2015    Elevated transaminase level     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    Dyslipidemia     GERD (gastroesophageal reflux disease)     History of coronary angioplasty with insertion of stent     Obesity     Primary osteoarthritis of right knee          PAST SURGICAL HISTORY  Past Surgical History:   Procedure Laterality Date    CARDIAC CATHETERIZATION N/A 2017    Procedure: Left Heart Cath;  Surgeon: Nash Oliver MD;  Location: [facility] CATH INVASIVE LOC[facility];  Service:     CATARACT EXTRACTION      CORONARY ANGIOPLASTY WITH STENT PLACEMENT      CORONARY STENT PLACEMENT      HERNIA REPAIR  1988    KNEE ARTHROSCOPY      ROTATOR CUFF REPAIR Left     ROTATOR CUFF REPAIR Right 2015    SHOULDER SURGERY      VASECTOMY           FAMILY HISTORY  Family History   Problem Relation Age of Onset    Heart disease Mother     Osteoarthritis Mother     Hypertension Mother     Heart attack Mother          1991    Arthritis Mother     Hypertension Father     Heart disease Father     Stroke Father     Cancer Father     Hypertension Other     Cancer Other     Heart disease Brother         ATRIAL FIBRILLATION         SOCIAL HISTORY  Social History     Socioeconomic History    Marital status:    Tobacco Use    Smoking status: Former     Packs/day: 1.00     Years: 10.00     Additional pack years: 0.00     Total pack years: 10.00     Types: Cigarettes     Start date: 1958     Quit date:  1969     Years since quittin.4    Smokeless tobacco: Never   Vaping Use    Vaping Use: Never used   Substance and Sexual Activity    Alcohol use: Yes     Alcohol/week: 6.0 standard drinks of alcohol     Types: 6 Shots of liquor per week     Comment: weekly    Drug use: Never    Sexual activity: Not Currently     Partners: Female     Birth control/protection: Condom, Pill, Diaphragm         ALLERGIES  Other        REVIEW OF SYSTEMS    All systems reviewed and marked as negative except as listed in HPI     PHYSICAL EXAM    I have reviewed the triage vital signs and nursing notes.    ED Triage Vitals   Temp Heart Rate Resp BP SpO2   23 0737 23 0733 23 0733 23 0736 23 0733   97 °F (36.1 °C) 81 18 (!) 211/103 98 %      Temp src Heart Rate Source Patient Position BP Location FiO2 (%)   -- -- -- -- --              Physical Exam  Constitutional:       General: He is not in acute distress.     Appearance: He is well-developed.   HENT:      Head: Normocephalic and atraumatic.   Eyes:      General: No scleral icterus.     Conjunctiva/sclera: Conjunctivae normal.   Neck:      Trachea: No tracheal deviation.   Cardiovascular:      Rate and Rhythm: Normal rate. Rhythm irregularly irregular.   Pulmonary:      Effort: Pulmonary effort is normal.      Breath sounds: Normal breath sounds.   Abdominal:      Palpations: Abdomen is soft.      Tenderness: There is no abdominal tenderness. There is no guarding.   Musculoskeletal:         General: No deformity.      Cervical back: Normal range of motion.   Lymphadenopathy:      Cervical: No cervical adenopathy.   Skin:     General: Skin is warm and dry.   Neurological:      Mental Status: He is alert and oriented to person, place, and time.   Psychiatric:         Behavior: Behavior normal.         Vital signs and nursing notes reviewed.            LAB RESULTS  Recent Results (from the past 24 hour(s))   ECG 12 Lead ED Triage Standing Order; Weak /  Dizzy / AMS    Collection Time: 12/16/23  7:43 AM   Result Value Ref Range    QT Interval 375 ms    QTC Interval 411 ms   Comprehensive Metabolic Panel    Collection Time: 12/16/23  7:49 AM    Specimen: Arm, Right; Blood   Result Value Ref Range    Glucose 138 (H) 65 - 99 mg/dL    BUN 11 8 - 23 mg/dL    Creatinine 0.77 0.76 - 1.27 mg/dL    Sodium 139 136 - 145 mmol/L    Potassium 4.0 3.5 - 5.2 mmol/L    Chloride 101 98 - 107 mmol/L    CO2 27.4 22.0 - 29.0 mmol/L    Calcium 9.2 8.6 - 10.5 mg/dL    Total Protein 6.5 6.0 - 8.5 g/dL    Albumin 4.3 3.5 - 5.2 g/dL    ALT (SGPT) 42 (H) 1 - 41 U/L    AST (SGOT) 25 1 - 40 U/L    Alkaline Phosphatase 85 39 - 117 U/L    Total Bilirubin 0.7 0.0 - 1.2 mg/dL    Globulin 2.2 gm/dL    A/G Ratio 2.0 g/dL    BUN/Creatinine Ratio 14.3 7.0 - 25.0    Anion Gap 10.6 5.0 - 15.0 mmol/L    eGFR 88.8 >60.0 mL/min/1.73   Single High Sensitivity Troponin T    Collection Time: 12/16/23  7:49 AM    Specimen: Arm, Right; Blood   Result Value Ref Range    HS Troponin T 18 <22 ng/L   Magnesium    Collection Time: 12/16/23  7:49 AM    Specimen: Arm, Right; Blood   Result Value Ref Range    Magnesium 2.1 1.6 - 2.4 mg/dL   Green Top (Gel)    Collection Time: 12/16/23  7:49 AM   Result Value Ref Range    Extra Tube Hold for add-ons.    Lavender Top    Collection Time: 12/16/23  7:49 AM   Result Value Ref Range    Extra Tube hold for add-on    Light Blue Top    Collection Time: 12/16/23  7:49 AM   Result Value Ref Range    Extra Tube Hold for add-ons.    CBC Auto Differential    Collection Time: 12/16/23  7:49 AM    Specimen: Arm, Right; Blood   Result Value Ref Range    WBC 8.92 3.40 - 10.80 10*3/mm3    RBC 4.97 4.14 - 5.80 10*6/mm3    Hemoglobin 16.0 13.0 - 17.7 g/dL    Hematocrit 47.1 37.5 - 51.0 %    MCV 94.8 79.0 - 97.0 fL    MCH 32.2 26.6 - 33.0 pg    MCHC 34.0 31.5 - 35.7 g/dL    RDW 12.4 12.3 - 15.4 %    RDW-SD 42.7 37.0 - 54.0 fl    MPV 10.1 6.0 - 12.0 fL    Platelets 202 140 - 450 10*3/mm3     Neutrophil % 71.7 42.7 - 76.0 %    Lymphocyte % 16.4 (L) 19.6 - 45.3 %    Monocyte % 8.6 5.0 - 12.0 %    Eosinophil % 1.9 0.3 - 6.2 %    Basophil % 0.6 0.0 - 1.5 %    Immature Grans % 0.8 (H) 0.0 - 0.5 %    Neutrophils, Absolute 6.40 1.70 - 7.00 10*3/mm3    Lymphocytes, Absolute 1.46 0.70 - 3.10 10*3/mm3    Monocytes, Absolute 0.77 0.10 - 0.90 10*3/mm3    Eosinophils, Absolute 0.17 0.00 - 0.40 10*3/mm3    Basophils, Absolute 0.05 0.00 - 0.20 10*3/mm3    Immature Grans, Absolute 0.07 (H) 0.00 - 0.05 10*3/mm3    nRBC 0.0 0.0 - 0.2 /100 WBC   Gold Top - SST    Collection Time: 12/16/23  9:05 AM   Result Value Ref Range    Extra Tube Hold for add-ons.    Urinalysis With Microscopic If Indicated (No Culture) - Urine, Clean Catch    Collection Time: 12/16/23  9:48 AM    Specimen: Urine, Clean Catch   Result Value Ref Range    Color, UA Yellow Yellow, Straw    Appearance, UA Clear Clear    pH, UA 6.5 5.0 - 8.0    Specific Gravity, UA 1.008 1.005 - 1.030    Glucose, UA Negative Negative    Ketones, UA Negative Negative    Bilirubin, UA Negative Negative    Blood, UA Negative Negative    Protein, UA Negative Negative    Leuk Esterase, UA Negative Negative    Nitrite, UA Negative Negative    Urobilinogen, UA 0.2 E.U./dL 0.2 - 1.0 E.U./dL       Ordered the above labs and independently reviewed the results.        RADIOLOGY  CT Head Without Contrast    Result Date: 12/16/2023  CT HEAD WO CONTRAST-  INDICATIONS: Lightheaded  TECHNIQUE: Radiation dose reduction techniques were utilized, including automated exposure control and exposure modulation based on body size. Noncontrast head CT  COMPARISON: None available  FINDINGS:    No acute intracranial hemorrhage, midline shift or mass effect. No acute territorial infarct is identified.  Arterial calcifications are seen at the base of the brain.  Ventricles, cisterns, cerebral sulci are unremarkable for patient age.  Minimal paranasal sinus mucosal thickening is present. The  visualized paranasal sinuses, orbits, mastoid air cells are otherwise unremarkable.        No acute intracranial hemorrhage or hydrocephalus. If there is further clinical concern, MRI could be considered for further evaluation.  This report was finalized on 12/16/2023 10:32 AM by Dr. Neil Rodríguez M.D on Workstation: [email or web address]      XR Chest 1 View    Result Date: 12/16/2023  XR CHEST 1 VW-  HISTORY: Male who is 83 years-old, weakness  TECHNIQUE: Frontal views of the chest  COMPARISON: None available  FINDINGS: The heart size is normal. Aorta is calcified. Pulmonary vasculature is unremarkable. No focal pulmonary consolidation, pleural effusion, or pneumothorax. No acute osseous process.      No focal pulmonary consolidation. Follow-up/further evaluation can be obtained as clinical indications persist.  This report was finalized on 12/16/2023 8:27 AM by Dr. Neil Rodrgíuez M.D on Workstation: [employer]       I ordered the above noted radiological studies. Independently reviewed by me and discussed with radiologist.  See dictation above for official radiology interpretation.      PROCEDURES    Procedures        MEDICATIONS GIVEN IN ER    Medications   sodium chloride 0.9 % flush 10 mL (has no administration in time range)   metoprolol tartrate (LOPRESSOR) injection 5 mg (5 mg Intravenous Given 12/16/23 0913)         PROGRESS, DATA ANALYSIS, CONSULTS, AND MEDICAL DECISION MAKING    All labs have been independently interpreted by me.  All radiology studies have been interpreted by me.  Discussion below represents my analysis of pertinent findings related to patient's condition, differential diagnosis, treatment plan and final disposition.    Patient presentation and evaluation consistent with poorly controlled hypertension that I feel could be contributing to intermittent lightheadedness.  Due to his gait instability intermittently through the morning I feel he is most appropriately admitted for observation  overnight with continued monitoring of cardiac activity and symptoms.    - Chronic or social conditions impacting care: None      DIFFERENTIAL DIAGNOSIS INCLUDE BUT NOT LIMITED TO:     Cardiac arrhythmia, dehydration, MISTY      Orders placed during this visit:  Orders Placed This Encounter   Procedures    XR Chest 1 View    CT Head Without Contrast    [place] Draw    Comprehensive Metabolic Panel    Single High Sensitivity Troponin T    Magnesium    Urinalysis With Microscopic If Indicated (No Culture) - Urine, Clean Catch    CBC Auto Differential    NPO Diet NPO Type: Strict NPO    Undress & Gown    Continuous Pulse Oximetry    Vital Signs    Orthostatic Blood Pressure    Orthostatic Vitals    Oxygen Therapy- Nasal Cannula; Titrate 1-6 LPM Per SpO2; 90 - 95%    POC Glucose Once    ECG 12 Lead ED Triage Standing Order; Weak / Dizzy / AMS    Insert Peripheral IV    Fall Precautions    CBC & Differential    Green Top (Gel)    Lavender Top    Gold Top - SST    Light Blue Top         ED Course as of 12/16/23 1039   Sat Dec 16, 2023   0847 Per my independent interpretation of the chest x-ray, there is no obvious pneumothorax. [DC]   0937 Patient ambulating unassisted with a steady gait to the restroom.  No ataxia noted. [DC]   1007 Glucose(!): 138 [MM]   1007 HS Troponin T: 18 [MM]   1008 Chest x-ray is unremarkable no acute process appreciated on the chest x-ray.  I reviewed the radiologist report.  I also independently looked at the chest x-ray myself. [MM]   1025 My own independent interpretation of the EKG that was done at 7:43 AM reveals a rate of 72 I believe it is normal sinus rhythm QRS complex is narrow there is a PAC there is wavy artifact.  Normal axis I do not appreciate any acute injury pattern QT looks normal  I do not see any old EKG to compare with. [MM]   1038 I discussed the case with ОЛЕГ Tariq with Obs Unit at this time regarding the patient.  I discussed work-up, results, concerns.  I discussed the  consulting provider's desire for obs unit admission.    [DC]      ED Course User Index  [DC] Bennie Trinidad PA  [MM] Amadeo Welch MD       AS OF 10:39 EST VITALS:    BP - 155/89  HR - 67  TEMP - 97 °F (36.1 °C)  02 SATS - 95%    1040 I rechecked the patient.  I discussed the patient's labs, radiology findings (including all incidental findings), diagnosis, and plan for admission. The patient understands and agrees with the plan.      DIAGNOSIS  Final diagnoses:   Poorly-controlled hypertension   Lightheaded         DISPOSITION  Admit    Pt masked in first look. I wore a surgical mask throughout my encounters with the pt. I performed hand hygiene on entry into the pt room and upon exit.     Dictated utilizing Dragon dictation     Note Disclaimer: At [facility], we believe that sharing information builds trust and better relationships. You are receiving this note because you recently visited [facility]. It is possible you will see health information before a provider has talked with you about it. This kind of information can be easy to misunderstand. To help you fully understand what it means for your health, we urge you to discuss this note with your provider.      Bennie Trinidad PA  12/16/23 1041

## 2023-12-16 NOTE — ED PROVIDER NOTES
I supervised care provided by the midlevel provider.   We have discussed this patient's history, physical exam, and treatment plan.  I have reviewed the note and personally saw and examined the patient and agree with the plan of care.   History is obtained from the patient and spouse at bedside.  When this gentleman got up this morning at about 5:30 in the morning when he went to sit up from bed he developed abrupt onset of dizziness.  He describes the dizziness as if the room was spinning and he was spinning.  He sat himself still and he did improve.  He got up to walk.  He was able to initially take the first couple of steps and was doing okay by the time he got to the bathroom he was again very dizzy with the same symptoms he felt off balance.  He got to the toilet and studied himself.  He was eventually able to get back to the bed.  Denied any pain in the head or neck during these episode.  Denies any visual change or speech change.  Denies chest pain or shortness of breath or abdominal pain.  He felt a little lightheaded but the symptoms were more dizziness.  I do not gather from the history that these were symptoms that he was going to pass out.  Again no chest pain or shortness of breath.  No ringing in his ears.  He is never had episodes like this before.  Since this episode this morning he has had some periodic recurrences.  It seems to be associated with movement and getting up.  Right now he is asymptomatic.  He just ambulated to the bathroom and back to the bathroom without any complications or any unsteadiness.    GENERAL: Elderly male.  No acute cardiovascular respiratory disease.  Not distressed  HENT: nares patent  Head/neck/ face are symmetric without gross deformity or swelling  EYES: no scleral icterus  CV: regular rhythm, regular rate with intact distal pulses  RESPIRATORY: normal effort and no respiratory distress  ABDOMEN: soft and nontender  MUSCULOSKELETAL: no deformity  NEURO: alert and  appropriate, moves all extremities, follows commands NIH stroke scale is 0.  Patient is able to stand and ambulate with no ataxia  SKIN: warm, dry    Vital signs and nursing notes reviewed.    Plan   ED Course as of 12/16/23 1607   Sat Dec 16, 2023   0847 Per my independent interpretation of the chest x-ray, there is no obvious pneumothorax. [DC]   0937 Patient ambulating unassisted with a steady gait to the restroom.  No ataxia noted. [DC]   1007 Glucose(!): 138 [MM]   1007 HS Troponin T: 18 [MM]   1008 Chest x-ray is unremarkable no acute process appreciated on the chest x-ray.  I reviewed the radiologist report.  I also independently looked at the chest x-ray myself. [MM]   1025 My own independent interpretation of the EKG that was done at 7:43 AM reveals a rate of 72 I believe it is normal sinus rhythm QRS complex is narrow there is a PAC there is wavy artifact.  Normal axis I do not appreciate any acute injury pattern QT looks normal  I do not see any old EKG to compare with. [MM]   1038 I discussed the case with ОЛЕГ Tariq with Obs Unit at this time regarding the patient.  I discussed work-up, results, concerns.  I discussed the consulting provider's desire for obs unit admission.    [DC]      ED Course User Index  [DC] Bennie Trinidad PA  [MM] Amadeo Welch MD         This is a gentleman with multiple comorbidities and known diabetes, coronary artery disease with stents hypertension, and hyperlipidemia.  He presents with what is likely benign positional vertigo.  I think given his history I think it is prudent that we put him in the observation unit continue to monitor him and have neurology consult.  I discussed at length with the patient as well as the spouse.  I have reviewed the EKG.  I have reviewed his current rhythm on the monitor.  He is normal sinus rhythm per my evaluation he does have occasional PACs on the monitor.  I discussed this with the patient and the spouse.  All questions answered          Amadeo Welch MD  12/16/23 1545

## 2023-12-16 NOTE — ED NOTES
"Patient to ER via car from home for \"I woke up and got up to go to the bathroom and I got dizzy\"  Reports taking BP and it was high  Patient denies being dizzy at time of triage    Patient denies blurry vision or headache  Neg for all other s/s of stroke  "

## 2023-12-16 NOTE — PLAN OF CARE
Goal Outcome Evaluation:  Plan of Care Reviewed With: patient      Pt admitted to observation for dizziness that started earlier today. Pt has hx of tinnitus. Pt RSR on the monitor. Pt had MRI nothing acute identified. Pt alert and orient times 4, NAD, up with assist stand by due to dizziness. Pt awaiting cardiology evaluation. Will continue to monitor.pt's EKG showed possible afib in transport from EMS, but has been in sinus since admission to the observation unit. Pt's b/p was elevated as well.   Progress: improving

## 2023-12-16 NOTE — ED NOTES
Nursing report ED to floor  Shady Newell  83 y.o.  male    HPI :   Chief Complaint   Patient presents with    Hypertension    Dizziness       Admitting doctor:   Christiano Heredia MD    Admitting diagnosis:   The primary encounter diagnosis was Poorly-controlled hypertension. A diagnosis of Lightheaded was also pertinent to this visit.    Code status:   Current Code Status       Date Active Code Status Order ID Comments User Context       Prior            Allergies:   Other    Isolation:   No active isolations    Intake and Output  No intake or output data in the 24 hours ending 12/16/23 1128    Weight:   There were no vitals filed for this visit.    Most recent vitals:   Vitals:    12/16/23 0907 12/16/23 0931 12/16/23 0934 12/16/23 0952   BP: 160/89 155/89     Pulse:  67 69 67   Resp:       Temp:       SpO2:  94% 96% 95%       Active LDAs/IV Access:   Lines, Drains & Airways       Active LDAs       Name Placement date Placement time Site Days    Peripheral IV 12/16/23 0911 Left Antecubital 12/16/23 (P)   0911 (P)   Antecubital (P)   less than 1                    Labs (abnormal labs have a star):   Labs Reviewed   COMPREHENSIVE METABOLIC PANEL - Abnormal; Notable for the following components:       Result Value    Glucose 138 (*)     ALT (SGPT) 42 (*)     All other components within normal limits    Narrative:     GFR Normal >60  Chronic Kidney Disease <60  Kidney Failure <15    The GFR formula is only valid for adults with stable renal function between ages 18 and 70.   CBC WITH AUTO DIFFERENTIAL - Abnormal; Notable for the following components:    Lymphocyte % 16.4 (*)     Immature Grans % 0.8 (*)     Immature Grans, Absolute 0.07 (*)     All other components within normal limits   SINGLE HSTROPONIN T - Normal    Narrative:     High Sensitive Troponin T Reference Range:  <14.0 ng/L- Negative Female for AMI  <22.0 ng/L- Negative Male for AMI  >=14 - Abnormal Female indicating possible myocardial  injury.  >=22 - Abnormal Male indicating possible myocardial injury.   Clinicians would have to utilize clinical acumen, EKG, Troponin, and serial changes to determine if it is an Acute Myocardial Infarction or myocardial injury due to an underlying chronic condition.        MAGNESIUM - Normal   URINALYSIS W/ MICROSCOPIC IF INDICATED (NO CULTURE) - Normal    Narrative:     Urine microscopic not indicated.   RAINBOW DRAW    Narrative:     The following orders were created for panel order [place] Draw.  Procedure                               Abnormality         Status                     ---------                               -----------         ------                     Green Top (Gel)[[id number]]                                  Final result               Lavender Top[[id number]]                                     Final result               Gold Top - SST[[id number]]                                   Final result               Light Blue Top[[id number]]                                   Final result                 Please view results for these tests on the individual orders.   POCT GLUCOSE FINGERSTICK   CBC AND DIFFERENTIAL    Narrative:     The following orders were created for panel order CBC & Differential.  Procedure                               Abnormality         Status                     ---------                               -----------         ------                     CBC Auto Differential[[id number]]        Abnormal            Final result                 Please view results for these tests on the individual orders.   GREEN TOP   LAVENDER TOP   GOLD TOP - SST   LIGHT BLUE TOP       EKG:   ECG 12 Lead ED Triage Standing Order; Weak / Dizzy / AMS   Preliminary Result   HEART RATE= 72  bpm   RR Interval= 833  ms   AL Interval=   ms   P Horizontal Axis=   deg   P Front Axis=   deg   QRSD Interval= 89  ms   QT Interval= 375  ms   QTcB= 411  ms   QRS Axis= 2  deg   T Wave Axis= 38  deg   - ABNORMAL ECG -    Atrial fibrillation   Ventricular premature complex   RSR' in V1 or V2, right VCD or RVH   Electronically Signed By:    Date and Time of Study: 2023 07:43:49          Meds given in ED:   Medications   sodium chloride 0.9 % flush 10 mL (has no administration in time range)   metoprolol tartrate (LOPRESSOR) injection 5 mg (5 mg Intravenous Given 23 0913)       Imaging results:  CT Head Without Contrast    Result Date: 2023   No acute intracranial hemorrhage or hydrocephalus. If there is further clinical concern, MRI could be considered for further evaluation.  This report was finalized on 2023 10:32 AM by Dr. Neil Rodríguez M.D on Workstation: "[employer]" Chest 1 View    Result Date: 2023  No focal pulmonary consolidation. Follow-up/further evaluation can be obtained as clinical indications persist.  This report was finalized on 2023 8:27 AM by Dr. Neil Rodríguez M.D on Workstation: [employer]       Ambulatory status:   Partial assist    Social issues:   Social History     Socioeconomic History    Marital status:    Tobacco Use    Smoking status: Former     Packs/day: 1.00     Years: 10.00     Additional pack years: 0.00     Total pack years: 10.00     Types: Cigarettes     Start date: 1958     Quit date: 1969     Years since quittin.4    Smokeless tobacco: Never   Vaping Use    Vaping Use: Never used   Substance and Sexual Activity    Alcohol use: Yes     Alcohol/week: 6.0 standard drinks of alcohol     Types: 6 Shots of liquor per week     Comment: weekly    Drug use: Never    Sexual activity: Not Currently     Partners: Female     Birth control/protection: Condom, Pill, Diaphragm       NIH Stroke Scale:       Katarzyna Alejandre RN  23 11:28 EST

## 2023-12-17 ENCOUNTER — READMISSION MANAGEMENT (OUTPATIENT)
Dept: CALL CENTER | Facility: HOSPITAL | Age: 83
End: 2023-12-17
Payer: MEDICARE

## 2023-12-17 ENCOUNTER — APPOINTMENT (OUTPATIENT)
Dept: CARDIOLOGY | Facility: HOSPITAL | Age: 83
End: 2023-12-17
Payer: MEDICARE

## 2023-12-17 VITALS
DIASTOLIC BLOOD PRESSURE: 87 MMHG | WEIGHT: 233.69 LBS | HEIGHT: 69 IN | TEMPERATURE: 98.2 F | HEART RATE: 80 BPM | SYSTOLIC BLOOD PRESSURE: 157 MMHG | OXYGEN SATURATION: 96 % | RESPIRATION RATE: 18 BRPM | BODY MASS INDEX: 34.61 KG/M2

## 2023-12-17 LAB
ANION GAP SERPL CALCULATED.3IONS-SCNC: 11.1 MMOL/L (ref 5–15)
AORTIC DIMENSIONLESS INDEX: 0.3 (DI)
BH CV ECHO MEAS - AO MAX PG: 29.8 MMHG
BH CV ECHO MEAS - AO MEAN PG: 16 MMHG
BH CV ECHO MEAS - AO V2 MAX: 273 CM/SEC
BH CV ECHO MEAS - AO V2 VTI: 59.1 CM
BH CV ECHO MEAS - AVA(I,D): 1.38 CM2
BH CV ECHO MEAS - EDV(CUBED): 64 ML
BH CV ECHO MEAS - EDV(MOD-SP2): 76 ML
BH CV ECHO MEAS - EDV(MOD-SP4): 88 ML
BH CV ECHO MEAS - EF(MOD-BP): 59.6 %
BH CV ECHO MEAS - EF(MOD-SP2): 55.3 %
BH CV ECHO MEAS - EF(MOD-SP4): 62.5 %
BH CV ECHO MEAS - ESV(CUBED): 19.7 ML
BH CV ECHO MEAS - ESV(MOD-SP2): 34 ML
BH CV ECHO MEAS - ESV(MOD-SP4): 33 ML
BH CV ECHO MEAS - FS: 32.5 %
BH CV ECHO MEAS - IVS/LVPW: 1.17 CM
BH CV ECHO MEAS - IVSD: 1.4 CM
BH CV ECHO MEAS - LAT PEAK E' VEL: 10.2 CM/SEC
BH CV ECHO MEAS - LV DIASTOLIC VOL/BSA (35-75): 39.8 CM2
BH CV ECHO MEAS - LV MASS(C)D: 186.5 GRAMS
BH CV ECHO MEAS - LV MAX PG: 2.26 MMHG
BH CV ECHO MEAS - LV MEAN PG: 1 MMHG
BH CV ECHO MEAS - LV SYSTOLIC VOL/BSA (12-30): 14.9 CM2
BH CV ECHO MEAS - LV V1 MAX: 75.2 CM/SEC
BH CV ECHO MEAS - LV V1 VTI: 15.4 CM
BH CV ECHO MEAS - LVIDD: 4 CM
BH CV ECHO MEAS - LVIDS: 2.7 CM
BH CV ECHO MEAS - LVOT AREA: 5.3 CM2
BH CV ECHO MEAS - LVOT DIAM: 2.6 CM
BH CV ECHO MEAS - LVPWD: 1.2 CM
BH CV ECHO MEAS - MED PEAK E' VEL: 6.9 CM/SEC
BH CV ECHO MEAS - MV A MAX VEL: 93.6 CM/SEC
BH CV ECHO MEAS - MV DEC SLOPE: 233 CM/SEC2
BH CV ECHO MEAS - MV DEC TIME: 0.25 SEC
BH CV ECHO MEAS - MV E MAX VEL: 45.6 CM/SEC
BH CV ECHO MEAS - MV E/A: 0.49
BH CV ECHO MEAS - MV MAX PG: 4.2 MMHG
BH CV ECHO MEAS - MV MEAN PG: 1 MMHG
BH CV ECHO MEAS - MV P1/2T: 78.8 MSEC
BH CV ECHO MEAS - MV V2 VTI: 26.2 CM
BH CV ECHO MEAS - MVA(P1/2T): 2.8 CM2
BH CV ECHO MEAS - MVA(VTI): 3.1 CM2
BH CV ECHO MEAS - PA ACC TIME: 0.04 SEC
BH CV ECHO MEAS - PA V2 MAX: 117 CM/SEC
BH CV ECHO MEAS - PULM A REVS DUR: 0.14 SEC
BH CV ECHO MEAS - PULM A REVS VEL: 31.9 CM/SEC
BH CV ECHO MEAS - PULM DIAS VEL: 41.4 CM/SEC
BH CV ECHO MEAS - PULM S/D: 1.53
BH CV ECHO MEAS - PULM SYS VEL: 63.3 CM/SEC
BH CV ECHO MEAS - QP/QS: 1.08
BH CV ECHO MEAS - RAP SYSTOLE: 3 MMHG
BH CV ECHO MEAS - RV MAX PG: 1.41 MMHG
BH CV ECHO MEAS - RV V1 MAX: 59.4 CM/SEC
BH CV ECHO MEAS - RV V1 VTI: 11.7 CM
BH CV ECHO MEAS - RVOT DIAM: 3.1 CM
BH CV ECHO MEAS - RVSP: 28 MMHG
BH CV ECHO MEAS - SI(MOD-SP2): 19 ML/M2
BH CV ECHO MEAS - SI(MOD-SP4): 24.8 ML/M2
BH CV ECHO MEAS - SV(LVOT): 81.8 ML
BH CV ECHO MEAS - SV(MOD-SP2): 42 ML
BH CV ECHO MEAS - SV(MOD-SP4): 55 ML
BH CV ECHO MEAS - SV(RVOT): 88.3 ML
BH CV ECHO MEAS - TR MAX PG: 25.2 MMHG
BH CV ECHO MEAS - TR MAX VEL: 251 CM/SEC
BH CV ECHO MEASUREMENTS AVERAGE E/E' RATIO: 5.33
BH CV XLRA - RV BASE: 3.9 CM
BH CV XLRA - RV LENGTH: 7.3 CM
BH CV XLRA - RV MID: 3.3 CM
BH CV XLRA - TDI S': 16.2 CM/SEC
BUN SERPL-MCNC: 12 MG/DL (ref 8–23)
BUN/CREAT SERPL: 16.4 (ref 7–25)
CALCIUM SPEC-SCNC: 9.1 MG/DL (ref 8.6–10.5)
CHLORIDE SERPL-SCNC: 101 MMOL/L (ref 98–107)
CO2 SERPL-SCNC: 23.9 MMOL/L (ref 22–29)
CREAT SERPL-MCNC: 0.73 MG/DL (ref 0.76–1.27)
DEPRECATED RDW RBC AUTO: 43.1 FL (ref 37–54)
EGFRCR SERPLBLD CKD-EPI 2021: 90.3 ML/MIN/1.73
ERYTHROCYTE [DISTWIDTH] IN BLOOD BY AUTOMATED COUNT: 12.4 % (ref 12.3–15.4)
GLUCOSE BLDC GLUCOMTR-MCNC: 133 MG/DL (ref 70–130)
GLUCOSE BLDC GLUCOMTR-MCNC: 147 MG/DL (ref 70–130)
GLUCOSE SERPL-MCNC: 159 MG/DL (ref 65–99)
HCT VFR BLD AUTO: 45.1 % (ref 37.5–51)
HGB BLD-MCNC: 15.4 G/DL (ref 13–17.7)
LEFT ATRIUM VOLUME INDEX: 19.6 ML/M2
MCH RBC QN AUTO: 32.1 PG (ref 26.6–33)
MCHC RBC AUTO-ENTMCNC: 34.1 G/DL (ref 31.5–35.7)
MCV RBC AUTO: 94 FL (ref 79–97)
PLATELET # BLD AUTO: 181 10*3/MM3 (ref 140–450)
PMV BLD AUTO: 9.8 FL (ref 6–12)
POTASSIUM SERPL-SCNC: 3.8 MMOL/L (ref 3.5–5.2)
QT INTERVAL: 375 MS
QTC INTERVAL: 411 MS
RBC # BLD AUTO: 4.8 10*6/MM3 (ref 4.14–5.8)
SINUS: 3.6 CM
SODIUM SERPL-SCNC: 136 MMOL/L (ref 136–145)
WBC NRBC COR # BLD AUTO: 8.34 10*3/MM3 (ref 3.4–10.8)

## 2023-12-17 PROCEDURE — 85027 COMPLETE CBC AUTOMATED: CPT | Performed by: STUDENT IN AN ORGANIZED HEALTH CARE EDUCATION/TRAINING PROGRAM

## 2023-12-17 PROCEDURE — 80048 BASIC METABOLIC PNL TOTAL CA: CPT | Performed by: STUDENT IN AN ORGANIZED HEALTH CARE EDUCATION/TRAINING PROGRAM

## 2023-12-17 PROCEDURE — 93306 TTE W/DOPPLER COMPLETE: CPT

## 2023-12-17 PROCEDURE — 93306 TTE W/DOPPLER COMPLETE: CPT | Performed by: INTERNAL MEDICINE

## 2023-12-17 PROCEDURE — 25510000001 PERFLUTREN (DEFINITY) 8.476 MG IN SODIUM CHLORIDE (PF) 0.9 % 10 ML INJECTION: Performed by: STUDENT IN AN ORGANIZED HEALTH CARE EDUCATION/TRAINING PROGRAM

## 2023-12-17 PROCEDURE — 99214 OFFICE O/P EST MOD 30 MIN: CPT | Performed by: INTERNAL MEDICINE

## 2023-12-17 PROCEDURE — 82948 REAGENT STRIP/BLOOD GLUCOSE: CPT

## 2023-12-17 PROCEDURE — G0378 HOSPITAL OBSERVATION PER HR: HCPCS

## 2023-12-17 RX ORDER — MECLIZINE HYDROCHLORIDE 25 MG/1
25 TABLET ORAL EVERY 6 HOURS PRN
Qty: 30 TABLET | Refills: 0 | Status: SHIPPED | OUTPATIENT
Start: 2023-12-17

## 2023-12-17 RX ADMIN — HYDROCHLOROTHIAZIDE 25 MG: 25 TABLET ORAL at 08:44

## 2023-12-17 RX ADMIN — ASPIRIN 81 MG: 81 TABLET, COATED ORAL at 08:44

## 2023-12-17 RX ADMIN — PERFLUTREN 2 ML: 6.52 INJECTION, SUSPENSION INTRAVENOUS at 15:38

## 2023-12-17 RX ADMIN — FINASTERIDE 5 MG: 5 TABLET, FILM COATED ORAL at 08:45

## 2023-12-17 RX ADMIN — CARVEDILOL 6.25 MG: 6.25 TABLET, FILM COATED ORAL at 08:44

## 2023-12-17 RX ADMIN — LISINOPRIL 40 MG: 20 TABLET ORAL at 08:44

## 2023-12-17 RX ADMIN — MULTIPLE VITAMINS W/ MINERALS TAB 1 TABLET: TAB at 08:44

## 2023-12-17 RX ADMIN — Medication 10 ML: at 08:45

## 2023-12-17 NOTE — PLAN OF CARE
Goal Outcome Evaluation:  Plan of Care Reviewed With: patient        Progress: improving  Outcome Evaluation: Alert, oriented, up to BR, c/o intermit dizziness, no c/o pain, AM labs done, VSS

## 2023-12-17 NOTE — DISCHARGE SUMMARY
ED OBSERVATION PROGRESS/DISCHARGE SUMMARY    Date of Admission: 12/16/2023   LOS: 0 days   PCP: Marko Lock MD    Subjective:  Patient states he did have another fleeting episode of dizziness overnight that lasted for few seconds before resolving.  He denies any chest pain or chest discomfort.  Denies palpitations.  Denies shortness of breath.  Denies abdominal pain and nausea.    Hospital Outcome:   83-year-old male admitted to the observation unit with a complaint of positional lightheaded and dizziness.  In the emergency department high-sensitivity troponins were 18, 19.  Although the lab work is baseline for the patient.  Urinalysis is negative.  Chest x-ray shows no focal pulmonary consolidation.  CT head without contrast shows no acute intercranial hemorrhage or hydrocephalus.  MRI brain shows no acute infarct.    Cardiology consulted, and echocardiogram was ordered that shows normal LV function with an EF 56 to 60%, does note some mild to moderate aortic valve stenosis.  I have discussed the results with Dr. Alanis and he will have patient follow-up in the clinic.    Patient will go home with meclizine as needed for dizziness and a vestibular physical therapy consult.  I have also placed a referral to internal medicine for patient to establish a primary care provider here in [place] as his previous one is in [place].  Patient is in agreement with the plan.      ROS:  General: no fevers, chills  Respiratory: no cough, dyspnea  Cardiovascular: no chest pain, palpitations  Abdomen: No abdominal pain, nausea, vomiting, or diarrhea  Neurologic: No focal weakness    Objective   Physical Exam:  I have reviewed the vital signs.  Temp:  [97.5 °F (36.4 °C)-98.8 °F (37.1 °C)] 98.2 °F (36.8 °C)  Heart Rate:  [79-96] 80  Resp:  [16-18] 18  BP: (109-171)/() 157/87  General Appearance:  83-year-old male in no acute distress on room air  Head:    Normocephalic, atraumatic  Eyes:    Sclerae anicteric, no  nystagmus  Neck:   Supple, no mass  Lungs: Clear to auscultation bilaterally, respirations unlabored  Heart: Regular rate and rhythm, S1 and S2 normal, no murmur, rub or gallop  Abdomen:  Soft, non-tender, bowel sounds active, nondistended  Extremities: No clubbing, cyanosis, or edema to lower extremities  Pulses:  2+ and symmetric in distal lower extremities  Skin: No rashes   Neurologic: Oriented x3, Normal strength to extremities    Results Review:    I have reviewed the labs, radiology results and diagnostic studies.    Results from last 7 days   Lab Units 12/17/23  0442   WBC 10*3/mm3 8.34   HEMOGLOBIN g/dL 15.4   HEMATOCRIT % 45.1   PLATELETS 10*3/mm3 181     Results from last 7 days   Lab Units 12/17/23  0442 12/16/23  0749   SODIUM mmol/L 136 139   POTASSIUM mmol/L 3.8 4.0   CHLORIDE mmol/L 101 101   CO2 mmol/L 23.9 27.4   BUN mg/dL 12 11   CREATININE mg/dL 0.73* 0.77   CALCIUM mg/dL 9.1 9.2   BILIRUBIN mg/dL  --  0.7   ALK PHOS U/L  --  85   ALT (SGPT) U/L  --  42*   AST (SGOT) U/L  --  25   GLUCOSE mg/dL 159* 138*     Imaging Results (Last 24 Hours)       Procedure Component Value Units Date/Time    MRI Brain Without Contrast [[id number]] Collected: 12/16/23 1645     Updated: 12/16/23 1653    Narrative:      MRI BRAIN WO CONTRAST-     INDICATIONS: Dizziness     TECHNIQUE: Multiplanar multisequence noncontrast magnetic resonance  imaging of the brain.     COMPARISON: Head CT from same date     FINDINGS:     The diffusion-weighted images show no restricted diffusion to suggest  acute infarct.     The midline structures appear unremarkable.     The brain parenchyma shows essentially normal signal intensity.     Flow voids in the major arteries at the base of the brain appear  unremarkable.     The paranasal sinuses, mastoid air cells, and orbits appear  unremarkable.       Impression:      No acute infarct.        This report was finalized on 12/16/2023 4:50 PM by Dr. Neil Rodríguez M.D on Workstation:  [id number]               I have reviewed the medications.  ---------------------------------------------------------------------------------------------  Assessment & Plan   Assessment/Problem List    Dizziness      Plan:    Dizziness  Hypertensive urgency  -CT head negative acute  -Troponin 18, trend  -EKG shows what appears to be sinus rhythm with PAC, no acute ischemia  -Patient received 5 mg IV Lopressor in ER  -MRI of the brain negative for acute intracranial findings  -Echocardiogram notes mild to moderate aortic stenosis  -Cardiology will have the patient follow-up in the clinic  -Continue home carvedilol, hydrochlorothiazide, lisinopril  -Continue aspirin and statin  -Meclizine as needed for dizziness  -Vestibular PT referral placed  -Referral to internal medicine to establish PCP     Type 2 diabetes  -Hold home glimepiride  -Sliding scale insulin Accu-Cheks with meals and at bedtime    Disposition: Home    Follow-up after Discharge: Vestibular PT, cardiology, PCP    Noelle Quispe PA-C 12/17/23 16:31 EST    I have worn appropriate PPE during this patient encounter, sanitized my hands both with entering and exiting patient's room.      49 minutes has been spent by [facility] providers in the care of this patient while under observation status

## 2023-12-17 NOTE — DISCHARGE INSTRUCTIONS
Take meclizine 25 mg tablets as needed for dizziness  You will have a follow-up appointment with cardiology, the office should contact you regarding this appointment.  Follow-up with your primary care provider in 1 to 2 weeks.  An ambulatory referral to vestibular PT has been placed, you should receive a call to get this appointment scheduled    Return to the emergency department with worsening symptoms, uncontrolled pain, inability to tolerate oral liquids, fever greater than 101°F not controlled by Tylenol or as needed with emergent concerns.

## 2023-12-17 NOTE — PLAN OF CARE
Goal Outcome Evaluation:     Problem: Adult Inpatient Plan of Care  Goal: Plan of Care Review  Outcome: Met  Goal: Patient-Specific Goal (Individualized)  Outcome: Met  Goal: Absence of Hospital-Acquired Illness or Injury  Outcome: Met  Intervention: Identify and Manage Fall Risk  Recent Flowsheet Documentation  Taken 12/17/2023 1239 by Bc Riggs RN  Safety Promotion/Fall Prevention:   safety round/check completed   room organization consistent   lighting adjusted   nonskid shoes/slippers when out of bed   activity supervised   clutter free environment maintained  Taken 12/17/2023 1027 by Bc Riggs RN  Safety Promotion/Fall Prevention:   safety round/check completed   room organization consistent   lighting adjusted   nonskid shoes/slippers when out of bed   activity supervised   clutter free environment maintained  Taken 12/17/2023 0846 by Bc Riggs RN  Safety Promotion/Fall Prevention:   safety round/check completed   room organization consistent   lighting adjusted   nonskid shoes/slippers when out of bed   activity supervised   clutter free environment maintained  Taken 12/17/2023 0712 by Bc Riggs RN  Safety Promotion/Fall Prevention:   safety round/check completed   room organization consistent   lighting adjusted   nonskid shoes/slippers when out of bed   activity supervised   clutter free environment maintained  Intervention: Prevent Skin Injury  Recent Flowsheet Documentation  Taken 12/17/2023 1239 by Bc Riggs RN  Body Position: position changed independently  Taken 12/17/2023 1027 by Bc Riggs RN  Body Position: position changed independently  Taken 12/17/2023 0846 by Bc Riggs RN  Body Position: position changed independently  Taken 12/17/2023 0712 by Bc Riggs RN  Body Position: position changed independently  Skin Protection: adhesive use limited  Intervention: Prevent and Manage VTE (Venous Thromboembolism) Risk  Recent Flowsheet Documentation  Taken 12/17/2023 1239 by  Bc Riggs, RN  Activity Management:   up ad sal   activity encouraged  Taken 12/17/2023 1027 by Bc Riggs RN  Activity Management:   activity encouraged   ambulated to bathroom  Taken 12/17/2023 0846 by Bc Rgigs RN  Activity Management:   up ad sal   activity encouraged  Taken 12/17/2023 0712 by Bc Riggs RN  Activity Management:   up ad sal   activity encouraged  Intervention: Prevent Infection  Recent Flowsheet Documentation  Taken 12/17/2023 1239 by Bc Riggs RN  Infection Prevention:   rest/sleep promoted   single patient room provided  Taken 12/17/2023 1027 by Bc Riggs RN  Infection Prevention:   rest/sleep promoted   single patient room provided  Taken 12/17/2023 0846 by cB Riggs RN  Infection Prevention:   rest/sleep promoted   single patient room provided  Taken 12/17/2023 0712 by Bc Riggs RN  Infection Prevention:   rest/sleep promoted   single patient room provided  Goal: Optimal Comfort and Wellbeing  Outcome: Met  Intervention: Provide Person-Centered Care  Recent Flowsheet Documentation  Taken 12/17/2023 0712 by Bc Riggs RN  Trust Relationship/Rapport:   reassurance provided   thoughts/feelings acknowledged   questions answered   empathic listening provided   care explained  Goal: Readiness for Transition of Care  Outcome: Met  Goal: Plan of Care Review  Outcome: Met  Goal: Patient-Specific Goal (Individualized)  Outcome: Met  Goal: Absence of Hospital-Acquired Illness or Injury  Outcome: Met  Intervention: Identify and Manage Fall Risk  Recent Flowsheet Documentation  Taken 12/17/2023 1239 by Bc Riggs RN  Safety Promotion/Fall Prevention:   safety round/check completed   room organization consistent   lighting adjusted   nonskid shoes/slippers when out of bed   activity supervised   clutter free environment maintained  Taken 12/17/2023 1027 by Bc Riggs, RN  Safety Promotion/Fall Prevention:   safety round/check completed   room organization consistent    lighting adjusted   nonskid shoes/slippers when out of bed   activity supervised   clutter free environment maintained  Taken 12/17/2023 0846 by Bc Riggs RN  Safety Promotion/Fall Prevention:   safety round/check completed   room organization consistent   lighting adjusted   nonskid shoes/slippers when out of bed   activity supervised   clutter free environment maintained  Taken 12/17/2023 0712 by Bc Riggs, VALENCIA  Safety Promotion/Fall Prevention:   safety round/check completed   room organization consistent   lighting adjusted   nonskid shoes/slippers when out of bed   activity supervised   clutter free environment maintained  Intervention: Prevent Skin Injury  Recent Flowsheet Documentation  Taken 12/17/2023 1239 by Bc Riggs, RN  Body Position: position changed independently  Taken 12/17/2023 1027 by Bc Riggs RN  Body Position: position changed independently  Taken 12/17/2023 0846 by Bc Riggs RN  Body Position: position changed independently  Taken 12/17/2023 0712 by Bc Riggs RN  Body Position: position changed independently  Skin Protection: adhesive use limited  Intervention: Prevent and Manage VTE (Venous Thromboembolism) Risk  Recent Flowsheet Documentation  Taken 12/17/2023 1239 by Bc Riggs, VALENCIA  Activity Management:   up ad sal   activity encouraged  Taken 12/17/2023 1027 by Bc Riggs RN  Activity Management:   activity encouraged   ambulated to bathroom  Taken 12/17/2023 0846 by Bc Riggs RN  Activity Management:   up ad sal   activity encouraged  Taken 12/17/2023 0712 by Bc Riggs RN  Activity Management:   up ad sal   activity encouraged  Intervention: Prevent Infection  Recent Flowsheet Documentation  Taken 12/17/2023 1239 by Bc Riggs, VALENCIA  Infection Prevention:   rest/sleep promoted   single patient room provided  Taken 12/17/2023 1027 by Bc Riggs RN  Infection Prevention:   rest/sleep promoted   single patient room provided  Taken 12/17/2023 0846 by  Llasos, Llan, RN  Infection Prevention:   rest/sleep promoted   single patient room provided  Taken 12/17/2023 0712 by Bc Riggs RN  Infection Prevention:   rest/sleep promoted   single patient room provided  Goal: Optimal Comfort and Wellbeing  Outcome: Met  Intervention: Provide Person-Centered Care  Recent Flowsheet Documentation  Taken 12/17/2023 0712 by Bc Riggs RN  Trust Relationship/Rapport:   reassurance provided   thoughts/feelings acknowledged   questions answered   empathic listening provided   care explained  Goal: Readiness for Transition of Care  Outcome: Met     Problem: Skin Injury Risk Increased  Goal: Skin Health and Integrity  Outcome: Met  Intervention: Optimize Skin Protection  Recent Flowsheet Documentation  Taken 12/17/2023 0712 by Bc Riggs RN  Skin Protection: adhesive use limited     Problem: Diabetes Comorbidity  Goal: Blood Glucose Level Within Targeted Range  Outcome: Met  Intervention: Monitor and Manage Glycemia  Recent Flowsheet Documentation  Taken 12/17/2023 0712 by Bc Riggs RN  Glycemic Management: blood glucose monitored     Problem: Heart Failure Comorbidity  Goal: Maintenance of Heart Failure Symptom Control  Outcome: Met  Intervention: Maintain Heart Failure-Management  Recent Flowsheet Documentation  Taken 12/17/2023 1239 by Bc Riggs RN  Medication Review/Management: medications reviewed  Taken 12/17/2023 1027 by Bc Riggs RN  Medication Review/Management: medications reviewed  Taken 12/17/2023 0846 by Bc Riggs RN  Medication Review/Management: medications reviewed  Taken 12/17/2023 0712 by Bc Riggs RN  Medication Review/Management: medications reviewed     Problem: Hypertension Comorbidity  Goal: Blood Pressure in Desired Range  Outcome: Met  Intervention: Maintain Blood Pressure Management  Recent Flowsheet Documentation  Taken 12/17/2023 1239 by Bc Riggs RN  Medication Review/Management: medications reviewed  Taken 12/17/2023 1027  by Bc Riggs, RN  Medication Review/Management: medications reviewed  Taken 12/17/2023 0846 by Bc Riggs, RN  Medication Review/Management: medications reviewed  Taken 12/17/2023 0712 by Bc Riggs, RN  Syncope Management: position changed slowly  Medication Review/Management: medications reviewed

## 2023-12-17 NOTE — CONSULTS
Date of Hospital Visit: 2023  Encounter Provider: Nato Michaud Jr, MD  Place of Service: [facility] CARDIOLOGY  Patient Name: Shady Newell  :1940  Referral Provider: Amadeo Welch MD    Chief complaint: Dizziness    History of Present Illness: 83-year-old male with known coronary disease who has historically followed with [facility] cardiology presents for episodic dizziness yesterday.  He had no chest pain or shortness of air.  He noted that he had dizziness when he stood up or sat down.  He has had multiple dizzy spells while rolling over in bed.  He describes this as a sensation of the room spinning.  He has no focal neurological deficits.  No episodes of palpitations or syncope.  He denies orthopnea, PND or edema.  He had PCI to the LAD in the mid s and then 2 stents placed in the RCA in 2017.  Since then he has done extremely well.  He is recently moved to [place] and presented to the ER yesterday for evaluation of dizziness.      Past Medical History:   Diagnosis Date    BPH (benign prostatic hyperplasia)     CHF (congestive heart failure) ?    Controlled type 2 diabetes mellitus without complication, without long-term current use of insulin 2021 A1c 6.4  A1c 7.34    Coronary artery disease involving native coronary artery of native heart without angina pectoris 2016    Dyslipidemia     Elevated transaminase level     Essential hypertension     GERD (gastroesophageal reflux disease)     History of coronary angioplasty with insertion of stent 2006    Stent  and     Mixed hyperlipidemia     Obesity     Primary osteoarthritis involving multiple joints 2016    Primary osteoarthritis of right knee     Systolic murmur 2021    3/2021: Grade 2/6 R IC space       Past Surgical History:   Procedure Laterality Date    CARDIAC CATHETERIZATION N/A 2017    Procedure: Left Heart Cath;  Surgeon: Nash Oliver  MD;  Location: [facility] CATH INVASIVE LOCATION;  Service:     CATARACT EXTRACTION      CORONARY ANGIOPLASTY WITH STENT PLACEMENT  2004    CORONARY STENT PLACEMENT  2006    HERNIA REPAIR  1988    KNEE ARTHROSCOPY      ROTATOR CUFF REPAIR Left 1990    ROTATOR CUFF REPAIR Right 06/2015    SHOULDER SURGERY      VASECTOMY         Medications Prior to Admission   Medication Sig Dispense Refill Last Dose    aspirin 81 MG EC tablet Take 1 tablet by mouth Daily. 1 tablet 0 12/16/2023    atorvastatin (LIPITOR) 10 MG tablet TAKE 1 TABLET BY MOUTH EVERY DAY AT NIGHT 90 tablet 3 12/15/2023    carvedilol (COREG) 6.25 MG tablet TAKE 1 TABLET BY MOUTH TWICE A  tablet 1     famotidine (PEPCID) 20 MG tablet TAKE 1 TABLET BY MOUTH EVERYDAY AT BEDTIME 90 tablet 3 12/15/2023    finasteride (PROSCAR) 5 MG tablet Take 1 tablet by mouth Daily. 90 tablet 1     glimepiride (AMARYL) 2 MG tablet TAKE 1 TABLET BY MOUTH EVERY DAY BEFORE BREAKFAST 90 tablet 1 12/16/2023    hydroCHLOROthiazide (HYDRODIURIL) 25 MG tablet Take 1 tablet by mouth Daily. 90 tablet 3     loratadine (CLARITIN) 10 MG tablet TAKE 1 TABLET BY MOUTH EVERY DAY AS NEEDED FOR ALLERGIES 90 tablet 1     Multiple Vitamins-Minerals (MULTIVITAMIN ADULT PO) Take 1 tablet by mouth Daily.   12/16/2023    nitroglycerin (NITROSTAT) 0.4 MG SL tablet Place 1 tablet under the tongue Every 5 (Five) Minutes As Needed for Chest Pain. Take no more than 3 doses in 15 minutes. 30 tablet 1     ramipril (ALTACE) 10 MG capsule TAKE 1 CAPSULE BY MOUTH TWICE A  capsule 1 12/16/2023    tamsulosin (FLOMAX) 0.4 MG capsule 24 hr capsule Take 1 capsule by mouth Every Night. 90 capsule 1     tolterodine LA (DETROL LA) 4 MG 24 hr capsule Take 1 capsule by mouth Daily. 90 capsule 1     glucose blood test strip 1 each by Other route Daily As Needed (Blood sugar monitoring). Use as instructed 100 each 12     glucose monitor monitoring kit 1 each Daily As Needed (Blood sugar monitoring). 1 each 0         Current Meds  Scheduled Meds:aspirin, 81 mg, Oral, Daily  atorvastatin, 10 mg, Oral, Nightly  carvedilol, 6.25 mg, Oral, BID  famotidine, 20 mg, Oral, Nightly  finasteride, 5 mg, Oral, Daily  hydroCHLOROthiazide, 25 mg, Oral, Daily  insulin lispro, 2-9 Units, Subcutaneous, 4x Daily AC & at Bedtime  lisinopril, 40 mg, Oral, Q12H  multivitamin with minerals, 1 tablet, Oral, Daily  senna-docusate sodium, 2 tablet, Oral, BID  sodium chloride, 10 mL, Intravenous, Q12H  tamsulosin, 0.4 mg, Oral, Nightly      Continuous Infusions:   PRN Meds:.  acetaminophen    senna-docusate sodium **AND** polyethylene glycol **AND** bisacodyl **AND** bisacodyl    dextrose    dextrose    glucagon (human recombinant)    nitroglycerin    ondansetron **OR** ondansetron    sodium chloride    sodium chloride    sodium chloride    Allergies as of 2023 - Reviewed 2023   Allergen Reaction Noted    Other  2016       Social History     Socioeconomic History    Marital status:    Tobacco Use    Smoking status: Former     Packs/day: 1.00     Years: 10.00     Additional pack years: 0.00     Total pack years: 10.00     Types: Cigarettes     Start date: 1958     Quit date: 1969     Years since quittin.4    Smokeless tobacco: Never   Vaping Use    Vaping Use: Never used   Substance and Sexual Activity    Alcohol use: Yes     Alcohol/week: 6.0 standard drinks of alcohol     Types: 6 Shots of liquor per week     Comment: weekly    Drug use: Never    Sexual activity: Not Currently     Partners: Female     Birth control/protection: Condom, Pill, Diaphragm       Family History   Problem Relation Age of Onset    Heart disease Mother     Osteoarthritis Mother     Hypertension Mother     Heart attack Mother          1991    Arthritis Mother     Hypertension Father     Heart disease Father     Stroke Father     Cancer Father     Hypertension Other     Cancer Other     Heart disease Brother         ATRIAL  "FIBRILLATION       Review of Systems   Constitutional: Negative for chills and fever.   HENT:  Negative for hoarse voice and sore throat.    Eyes:  Negative for double vision and photophobia.   Cardiovascular:  Positive for near-syncope. Negative for chest pain, leg swelling, orthopnea, palpitations, paroxysmal nocturnal dyspnea and syncope.   Respiratory:  Negative for cough and wheezing.    Skin:  Negative for poor wound healing and rash.   Musculoskeletal:  Negative for arthritis and joint swelling.   Gastrointestinal:  Negative for bloating, abdominal pain, hematemesis and hematochezia.   Neurological:  Negative for dizziness and focal weakness.   Psychiatric/Behavioral:  Negative for depression and suicidal ideas.             Objective:   Temp:  [97.5 °F (36.4 °C)-98.8 °F (37.1 °C)] 98.8 °F (37.1 °C)  Heart Rate:  [76-96] 95  Resp:  [16-18] 16  BP: (109-171)/() 156/94  Body mass index is 34.8 kg/m².  Flowsheet Rows      Flowsheet Row First Filed Value   Admission Height 175 cm (68.9\") Documented at 12/16/2023 1154   Admission Weight 107 kg (235 lb) Documented at 12/16/2023 1154          Vitals:    12/17/23 1016   BP: 156/94   Pulse: 95   Resp: 16   Temp:    SpO2: 95%       Vitals reviewed.   Constitutional:       Appearance: Healthy appearance. Not in distress.   Neck:      Vascular: No JVR. JVD normal.   Pulmonary:      Effort: Pulmonary effort is normal.      Breath sounds: Normal breath sounds. No wheezing. No rhonchi. No rales.   Chest:      Chest wall: Not tender to palpatation.   Cardiovascular:      PMI at left midclavicular line. Normal rate. Regular rhythm. Normal S1. Normal S2.       Murmurs: There is a systolic murmur.      No gallop.  No click. No rub.   Pulses:     Intact distal pulses.   Edema:     Peripheral edema absent.   Abdominal:      General: Bowel sounds are normal.      Palpations: Abdomen is soft.      Tenderness: There is no abdominal tenderness.   Musculoskeletal: Normal range of " motion.         General: No tenderness. Skin:     General: Skin is warm and dry.   Neurological:      General: No focal deficit present.      Mental Status: Alert and oriented to person, place and time.                 Lab Review:      Results from last 7 days   Lab Units 12/17/23  0442 12/16/23  0749   SODIUM mmol/L 136 139   POTASSIUM mmol/L 3.8 4.0   CHLORIDE mmol/L 101 101   CO2 mmol/L 23.9 27.4   BUN mg/dL 12 11   CREATININE mg/dL 0.73* 0.77   CALCIUM mg/dL 9.1 9.2   BILIRUBIN mg/dL  --  0.7   ALK PHOS U/L  --  85   ALT (SGPT) U/L  --  42*   AST (SGOT) U/L  --  25   GLUCOSE mg/dL 159* 138*     Results from last 7 days   Lab Units 12/16/23  1432 12/16/23  0749   HSTROP T ng/L 19 18     @LABRCNTbnp@  Results from last 7 days   Lab Units 12/17/23  0442 12/16/23  0749   WBC 10*3/mm3 8.34 8.92   HEMOGLOBIN g/dL 15.4 16.0   HEMATOCRIT % 45.1 47.1   PLATELETS 10*3/mm3 181 202         Results from last 7 days   Lab Units 12/16/23  0749   MAGNESIUM mg/dL 2.1     @LABRCNTIP(chol,trig,hdl,ldl)    I personally viewed and interpreted the patient's EKG/Telemetry data    Dizziness    Assessment and Plan:    Dizziness/near syncope -positional complaints were examined with orthostatic measurements which were negative.  This sounds more consistent with vertigo.  He has a mild murmur in the aortic position which has not previously been evaluated and is not documented in previous cardiology notes.  This is likely mild aortic stenosis and I would be very surprised if severity is greater than that.  If negative we will send the patient home with a short-term Holter monitor and I have discussed the case with ER staff who feel they can get him into vestibular physical therapy within the next 1 to 2 weeks.  CAD -no angina.  No acute ischemic changes on EKG.  Enzymes negative.  No indication for further testing at this time.  Murmur -check echo      Nato Michaud Jr, MD  12/17/23  10:39 EST.  Time spent in reviewing chart, discussion  and examination:

## 2023-12-17 NOTE — PROGRESS NOTES
MD Attestation Note    I supervised care provided by the midlevel provider.    The THELMA and I have discussed this patient's history, physical exam, and treatment plan. I have reviewed the documentation and personally had a face to face interaction with the patient  I affirm the documentation and agree with the treatment and plan.     I provided a substantive portion of the care of the patient.  I personally performed the physical exam in its entirety, and below are my findings.        Subjective  Pt is a 83 y.o. male admitted from Emergency Department for evaluation and treatment of dizziness which is worsened with changing head positions.  Symptoms last for seconds    Physical Exam  GENERAL: Alert and in NAD, Vitals reviewed  HENT: nares patent  EYES: no scleral icterus  CV: regular rhythm, regular rate  RESPIRATORY: normal effort  ABDOMEN: soft  MUSCULOSKELETAL: no deformity  NEURO: Strength sensation and coordination are grossly intact.  Cerebellar testing benign speech and mentation are unremarkable  SKIN: warm, dry    Assessment/Plan  I discussed tx and evaluation of this patient with ОЛЕГ Quispe.  MRI brain unremarkable.  Labs also unremarkable.  Cardiology consultation pending.  Etiology of dizziness is unclear but suggestive of possible peripheral vertigo.

## 2023-12-18 ENCOUNTER — TRANSITIONAL CARE MANAGEMENT TELEPHONE ENCOUNTER (OUTPATIENT)
Dept: CALL CENTER | Facility: HOSPITAL | Age: 83
End: 2023-12-18
Payer: MEDICARE

## 2023-12-18 NOTE — OUTREACH NOTE
Prep Survey      Flowsheet Row Responses   [facility] facility patient discharged from? [place]   Is LACE score < 7 ? Yes   Eligibility [facility]   Date of Admission 12/16/23   Date of Discharge 12/17/23   Discharge Disposition Home or Self Care   Discharge diagnosis dizziness, hypertensive urgency   Does the patient have one of the following disease processes/diagnoses(primary or secondary)? Other   Does the patient have Home health ordered? No   Is there a DME ordered? No   Prep survey completed? Yes            Krysten ROGERS - Registered Nurse

## 2023-12-18 NOTE — OUTREACH NOTE
Call Center TCM Note      Flowsheet Row Responses   [facility] patient discharged from? [place]   Does the patient have one of the following disease processes/diagnoses(primary or secondary)? Other   TCM attempt successful? Yes   Call start time 1028   Call end time 1033   Discharge diagnosis dizziness, hypertensive urgency   Meds reviewed with patient/caregiver? Yes   Is the patient having any side effects they believe may be caused by any medication additions or changes? No   Does the patient have all medications ordered at discharge? Yes   Is the patient taking all medications as directed (includes completed medication regime)? Yes   Medication comments Will  med today   Comments HOSP DC FU appt 12/20/23 10 am with APRN   Does the patient have an appointment with their PCP within 7-14 days of discharge? Yes   Has home health visited the patient within 72 hours of discharge? N/A   Psychosocial issues? No   Did the patient receive a copy of their discharge instructions? Yes   Nursing interventions Reviewed instructions with patient   What is the patient's perception of their health status since discharge? Improving   Is the patient/caregiver able to teach back signs and symptoms related to disease process for when to call PCP? Yes   Is the patient/caregiver able to teach back signs and symptoms related to disease process for when to call 911? Yes   Is the patient/caregiver able to teach back the hierarchy of who to call/visit for symptoms/problems? PCP, Specialist, Home health nurse, Urgent Care, ED, 911 Yes   TCM call completed? Yes   Wrap up additional comments Pt reports he is doing ok. Instructed Pt to keep list of BP and bring to office.   Call end time 1033            Heidy Eastman RN    12/18/2023, 10:33 EST

## 2023-12-19 ENCOUNTER — HOSPITAL ENCOUNTER (OUTPATIENT)
Dept: PHYSICAL THERAPY | Facility: HOSPITAL | Age: 83
Setting detail: THERAPIES SERIES
Discharge: HOME | End: 2023-12-19
Payer: MEDICARE

## 2023-12-19 DIAGNOSIS — R42 DIZZINESS: Primary | ICD-10-CM

## 2023-12-19 PROCEDURE — 97162 PT EVAL MOD COMPLEX 30 MIN: CPT | Performed by: PHYSICAL THERAPIST

## 2023-12-19 NOTE — THERAPY EVALUATION
Outpatient Physical Therapy Vestibular Initial Evaluation  [facility]     Patient Name: Shady Newell  : 1940  MRN: [id number]  Today's Date: 2023      Visit Date: 2023    Patient Active Problem List   Diagnosis    Coronary artery disease involving native coronary artery of native heart without angina pectoris    Mixed hyperlipidemia    Essential hypertension    Severe obesity with body mass index (BMI) of 35.0 to 39.9 with serious comorbidity    Primary osteoarthritis involving multiple joints    Benign prostatic hyperplasia with weak urinary stream    Gastroesophageal reflux disease without esophagitis    Controlled type 2 diabetes mellitus without complication, without long-term current use of insulin    Seasonal allergies    Systolic murmur    Full thickness rotator cuff tear    Elevated transaminase level    Glaucoma suspect of both eyes    Nonexudative age-related macular degeneration, bilateral, early dry stage    Meibomian gland dysfunction (MGD)    History of COVID-19    Urge incontinence of urine    Dizziness        Past Medical History:   Diagnosis Date    BPH (benign prostatic hyperplasia)     CHF (congestive heart failure) ?    Controlled type 2 diabetes mellitus without complication, without long-term current use of insulin 2021 A1c 6.4  A1c 7.34    Coronary artery disease involving native coronary artery of native heart without angina pectoris 2016    Dyslipidemia     Elevated transaminase level     Essential hypertension     GERD (gastroesophageal reflux disease)     History of coronary angioplasty with insertion of stent 2006    Stent  and 2016    Mixed hyperlipidemia     Obesity     Primary osteoarthritis involving multiple joints 2016    Primary osteoarthritis of right knee     Systolic murmur 2021    3/2021: Grade 2/6 R IC space        Past Surgical History:   Procedure Laterality Date    CARDIAC CATHETERIZATION N/A 2017     Procedure: Left Heart Cath;  Surgeon: Nash Oliver MD;  Location:  [facility];  Service:     CATARACT EXTRACTION      CORONARY ANGIOPLASTY WITH STENT PLACEMENT  2004    CORONARY STENT PLACEMENT  2006    HERNIA REPAIR  1988    KNEE ARTHROSCOPY      ROTATOR CUFF REPAIR Left 1990    ROTATOR CUFF REPAIR Right 06/2015    SHOULDER SURGERY      VASECTOMY           Visit Dx:     ICD-10-CM ICD-9-CM   1. Dizziness  R42 780.4        Patient History       Row Name 12/19/23 1300             History    Chief Complaint Dizziness  -GR      Date Current Problem(s) Began 12/16/23  -GR      Brief Description of Current Complaint Reports this past Saturday he got up and sat at the side of the bed and the room was spinning. This resolved and then he went to the toilet and symptoms restarted. He avoided a fall. Went to ED and all testing for cardiac work up or stroke were unremarkable and he was diagnosed with vertigo.  Does have some stenosis of the aortic valve. Did have a cold while in [place] at the end of October/November.  -GR      Patient/Caregiver Goals Relief from dizziness  -GR         Pain     Pain Location --  no associated pain  -GR      Is your sleep disturbed? No  -GR         Fall Risk Assessment    Any falls in the past year: No  -GR         Services    Do you plan to receive Home Health services in the near future No  -GR         Daily Activities    Primary Language English  -GR      How does patient learn best? Listening;Reading;Demonstration  -GR      Barriers to learning None  -GR      Pt Participated in POC and Goals Yes  -GR         Safety    Are you being hurt, hit, or frightened by anyone at home or in your life? No  -GR      Are you being neglected by a caregiver No  -GR                User Key  (r) = Recorded By, (t) = Taken By, (c) = Cosigned By      Initials Name Provider Type    Sunil Beckwith, PT Physical Therapist                     Odalys Roberts       Row Name 12/19/23 7075  12/19/23 1300          Occulomotor Exam Fixation Present    Occular ROM Normal  -GR --     Spontaneous Nystagmus Absent  -GR --     Gaze-induced Nystagmus Absent  -GR --     Smooth Pursuit Normal  -GR --     Saccades Intact  -GR --     Convergence Normal  -GR --        Positional Testing    Positional Testing With infrared goggles  -GR --  -GR     Vertebrobasilar Artery Screen - Right Negative  -GR --  -GR     Vertebrobasilar Artery Screen - Left Negative  -GR --  -GR     [place]-Hallpike Right No nystagmus  -GR --     [place]-Hallpike Left No nystagmus  -GR --     Horizontal Roll Test Right No nystagmus  -GR --     Horizontal Roll Test Left No nystagmus  -GR --        High-level Balance    High-level Balance Other modified CTSIB 63/120  -GR --        Cervicogenic Assessment    Cervicogenic Assess 45 R 60 L  -GR --               User Key  (r) = Recorded By, (t) = Taken By, (c) = Cosigned By      Initials Name Provider Type    Sunil Beckwith, PT Physical Therapist                                Therapy Education  Education Details: vestibular anatomy, findings, expectations                       PT OP Goals       Row Name 12/19/23 1400          PT Short Term Goals    STG 1 Patient will be independent with HEP.  -GR     STG 1 Progress New  -GR     STG 2 Patient will utilize static fixation for in home safety.  -GR     STG 2 Progress New  -GR        Long Term Goals    LTG 1 Patient will  remain negative for BPPV all canals.  -GR     LTG 1 Progress New  -GR     LTG 2 Patient will utilize dynamic adaptation techniques for community reintegration.  -GR     LTG 2 Progress New  -GR     LTG 3 Patient will score </= 0/100 on the DHI to indicate improved perceieved positional tolerance.  -GR     LTG 3 Progress New  -GR               User Key  (r) = Recorded By, (t) = Taken By, (c) = Cosigned By      Initials Name Provider Type    Sunil Beckwith, PT Physical Therapist                     PT Assessment/Plan       Row Name  23 1400          PT Assessment    Functional Limitations Impaired gait;Decreased safety during functional activities;Limitations in functional capacity and performance;Performance in leisure activities  -GR     Impairments Balance  -GR     Assessment Comments 82 y/o M referred for vestibular evaluation of acute vertigo x 3 days, stent in ED with normal testing, some aortic stenosis and HTN.  He presents negative for BPPV all canals. Does exhibit some vestibular weakness with modified CTSIB however I am unable to rule out an acute vestibular neuritis given his recent sickness and normal BPPV screen. Symptoms likely to resolve within 6 weeks but if not please return for vestibular adaptation training. Thank you for [facility] referrla.  -GR     Please refer to paper survey for additional self-reported information No  -GR     Rehab Potential Fair  -GR     Patient/caregiver participated in establishment of treatment plan and goals Yes  -GR     Patient would benefit from skilled therapy intervention Yes  -GR        PT Plan    PT Frequency 1x/week  -GR     Predicted Duration of Therapy Intervention (PT) 6 visits  -GR     Planned CPT's? PT EVAL MOD COMPLELITY: 75015;PT RE-EVAL: 53642;PT THER PROC EA 15 MIN: 21694;PT THER ACT EA 15 MIN: 80803;PT NEUROMUSC RE-EDUCATION EA 15 MIN: 21150;PT GAIT TRAINING EA 15 MIN: 63645;PT CANALITH REPOSITIONIN  -GR     Physical Therapy Interventions (Optional Details) balance training;home exercise program;neuromuscular re-education;patient/family education;vestibular training  -GR     PT Plan Comments If returns begin adaptation training.  -GR               User Key  (r) = Recorded By, (t) = Taken By, (c) = Cosigned By      Initials Name Provider Type    GR Sunil Whittington, PT Physical Therapist                     Outcome Measure Options: Dizziness Handicap Inventory         Time Calculation:   Start Time: 1330  Stop Time: 1408  Time Calculation (min): 38 min  Total Timed Code  Minutes- PT: 0 minute(s)  Untimed Charges  PT Eval/Re-eval Minutes: 38  Total Minutes  Untimed Charges Total Minutes: 38   Total Minutes: 38   Therapy Charges for Today       Code Description Service Date Service Provider Modifiers Qty    [id number] HC PT EVAL MOD COMPLEXITY 3 12/19/2023 Sunil Whittington, PT GP 1            PT G-Codes  Outcome Measure Options: Dizziness Handicap Inventory         Sunil Whittington, PT  12/19/2023

## 2023-12-20 ENCOUNTER — OFFICE VISIT (OUTPATIENT)
Dept: INTERNAL MEDICINE | Facility: CLINIC | Age: 83
End: 2023-12-20
Payer: MEDICARE

## 2023-12-20 VITALS
DIASTOLIC BLOOD PRESSURE: 84 MMHG | TEMPERATURE: 98.6 F | SYSTOLIC BLOOD PRESSURE: 124 MMHG | WEIGHT: 239.4 LBS | HEART RATE: 64 BPM | BODY MASS INDEX: 35.46 KG/M2 | HEIGHT: 69 IN

## 2023-12-20 DIAGNOSIS — H81.10 BENIGN PAROXYSMAL POSITIONAL VERTIGO, UNSPECIFIED LATERALITY: Primary | ICD-10-CM

## 2023-12-20 NOTE — PROGRESS NOTES
"Transitional Care Follow Up Visit    . Wife is health care proxy. Have 2 sons (born -66 and -73) - one in [facility] [place], the other in [place], [place]. Worked in bank in the past, later on building management. Moved from [place] to [facility] home in [place] Summer 2021.     Patient has a past medical history of coronary heart disease without MI and was stented in 2006 and 2016, CHF, T2DM (3/2021), GERD, dyslipidemia, osteoarthritis of multiple joints, osteoarthritis of multiple joints, hypertension w/ \"whitecoat\" component, BPH, obesity and elevated transaminase levels.    Subjective     Shady Newell is a 83 y.o. male who presents for a transitional care management visit.    Within 48 business hours after discharge our office contacted him via telephone to coordinate his care and needs.      I reviewed and discussed the details of that call along with the discharge summary, hospital problems, inpatient lab results, inpatient diagnostic studies, and consultation reports with Shady.     Current outpatient and discharge medications have been reconciled for the patient.  Reviewed by: Marko Lock MD          12/17/2023     7:18 PM   Date of TCM Phone Call   [facility]   Date of Admission 12/16/2023   Date of Discharge 12/17/2023   Discharge Disposition Home or Self Care     Risk for Readmission (LACE) Score: 2 (12/17/2023  6:00 AM)      History of Present Illness   Course During Hospital Stay: Patient was admitted to [facility] for episode of lightheadedness/dizziness.  Workup reassuring, including chest x-ray, CT head and MRI of brain without any evidence of infarct.  Patient was evaluated by cardiology, TTE showed normal LV function with EF 56-60%, mild to moderate aortic valve stenosis.  Recommended follow-up with cardiology after discharge.  They ordered him for meclizine, also referred for vestibular physical therapy.    Patient is here for TCM.  He went to PT " yesterday for vestibular therapy.  They did the vestibular tests and did not trigger any vertigo.  Patient tells me his last episode was on Saturday, 12/16/2023 while turning towards his left in bed.  This triggered episode of vertigo lasting for about 30 seconds.  He has not had any more episodes since.  He feels back to his baseline currently.  He reports no changes to his hearing, no change to his  Tinnitus    The following portions of the patient's history were reviewed and updated as appropriate: allergies, current medications, past family history, past medical history, past social history, past surgical history, and problem list.    Review of Systems    Objective   Physical Exam  Vitals reviewed.   Constitutional:       Appearance: Normal appearance.   HENT:      Head: Normocephalic and atraumatic.      Nose: Nose normal. No congestion.      Mouth/Throat:      Mouth: Mucous membranes are moist.   Eyes:      Extraocular Movements: Extraocular movements intact.      Conjunctiva/sclera: Conjunctivae normal.   Neck:      Vascular: No carotid bruit.   Cardiovascular:      Rate and Rhythm: Normal rate and regular rhythm.      Heart sounds: Normal heart sounds. No murmur heard.  Pulmonary:      Effort: Pulmonary effort is normal.      Breath sounds: Normal breath sounds.   Abdominal:      Palpations: Abdomen is soft.   Musculoskeletal:      Cervical back: Neck supple.      Right lower leg: No edema.      Left lower leg: No edema.   Skin:     General: Skin is warm and dry.   Neurological:      Mental Status: He is alert and oriented to person, place, and time. Mental status is at baseline.   Psychiatric:         Behavior: Behavior normal.         Thought Content: Thought content normal.         Assessment & Plan   Diagnoses and all orders for this visit:    1. Benign paroxysmal positional vertigo, unspecified laterality  Symptoms are typical for BPPV.  This has been episodic and positional, also associated with short  lasting vertigo.  I think this has resolved on its own.  Given activity, movement and turning in bed can potentially resolve this spontaneously.  He tells me the physical therapist was suggesting recent infection as cause of possible vestibular neuritis.  Typically I would expect symptoms to be more constant, not episodic, so in my opinion this is likely BPPV.  I have discussed this with patient.  Encouraged activity.  Certainly any underlying conditions are fairly much ruled out.  I have added patient reference to after visit summary for his review.  He does have follow-up with the physical therapist again in January 2024.  He will also follow-up with new cardiologist in [place].  He and his wife are considering finding a new PCP in [place] to avoid the travel to come see me.  For now we will keep his appointment in March, he can come see me at any time if he needs to.    Future Appointments         Provider Department Center    1/17/2024 10:15 AM Nato Michaud Jr., MD [facility] CARDIOLOGY [facility]    1/18/2024 10:00 AM Sunil Whittington, PT [facility] OP PT MEDICAL [facility]    3/27/2024 1:45 PM Marko Lock MD [facility] INTERNAL MEDICINE [facility]    9/13/2024 1:45 PM Markus Zhu MD [facility] CARDIOLOGY [facility]               Marko Lock MD

## 2024-01-17 ENCOUNTER — OFFICE VISIT (OUTPATIENT)
Dept: CARDIOLOGY | Facility: CLINIC | Age: 84
End: 2024-01-17
Payer: MEDICARE

## 2024-01-17 VITALS
DIASTOLIC BLOOD PRESSURE: 72 MMHG | SYSTOLIC BLOOD PRESSURE: 132 MMHG | HEIGHT: 69 IN | OXYGEN SATURATION: 93 % | HEART RATE: 88 BPM | BODY MASS INDEX: 35.99 KG/M2 | WEIGHT: 243 LBS

## 2024-01-17 DIAGNOSIS — E78.2 MIXED HYPERLIPIDEMIA: ICD-10-CM

## 2024-01-17 DIAGNOSIS — I25.10 CORONARY ARTERY DISEASE INVOLVING NATIVE CORONARY ARTERY OF NATIVE HEART WITHOUT ANGINA PECTORIS: Primary | ICD-10-CM

## 2024-01-17 DIAGNOSIS — I35.0 NONRHEUMATIC AORTIC (VALVE) STENOSIS: ICD-10-CM

## 2024-01-17 PROCEDURE — 3078F DIAST BP <80 MM HG: CPT | Performed by: INTERNAL MEDICINE

## 2024-01-17 PROCEDURE — 3075F SYST BP GE 130 - 139MM HG: CPT | Performed by: INTERNAL MEDICINE

## 2024-01-17 PROCEDURE — 1159F MED LIST DOCD IN RCRD: CPT | Performed by: INTERNAL MEDICINE

## 2024-01-17 PROCEDURE — 1160F RVW MEDS BY RX/DR IN RCRD: CPT | Performed by: INTERNAL MEDICINE

## 2024-01-17 PROCEDURE — 99214 OFFICE O/P EST MOD 30 MIN: CPT | Performed by: INTERNAL MEDICINE

## 2024-01-17 NOTE — PROGRESS NOTES
[facility]      Patient Name: Shady Newell  :1940  Age: 83 y.o.  Encounter Provider:  Nato Michaud Jr, MD      Chief Complaint:   Chief Complaint   Patient presents with    Coronary artery disease involving native coronary artery of         HPI  Shady Newell is a 83 y.o. male past medical history of coronary artery disease status post PCI LAD, hypertension and dyslipidemia who presents for hospital follow-up of near syncope.  Patient had an episode at home in the middle of December where he rolled over in bed and had a sensation of the room spinning.  He was brought into the hospital and evaluated due to his cardiac history.  Orthostatics were negative.  A murmur was noted on exam and he has previously followed with [facility].  He has a history of PCI to LAD and had been doing well from an ischemic heart disease perspective but there was no evaluation of murmur in the system.  We did an echocardiogram showing mild to moderate aortic stenosis.  Patient was sent home for an evaluation and vestibular PT.  He has not had any further vertiginous symptoms.  Blood pressure is well-controlled on home blood pressure log.  He denies angina or shortness of breath with activity.  He goes to the gym 5 days/week and works out on the treadmill for 15 minutes as well as the staNeedlyaster for 15 minutes.  No orthopnea, PND or edema.  No palpitations or syncope.  Last LDL 46.      The following portions of the patient's history were reviewed and updated as appropriate: allergies, current medications, past family history, past medical history, past social history, past surgical history and problem list.      Review of Systems   Constitutional: Negative for chills and fever.   HENT:  Negative for hoarse voice and sore throat.    Eyes:  Negative for double vision and photophobia.   Cardiovascular:  Negative for chest pain, leg swelling, near-syncope, orthopnea, palpitations,  "paroxysmal nocturnal dyspnea and syncope.   Respiratory:  Negative for cough and wheezing.    Skin:  Negative for poor wound healing and rash.   Musculoskeletal:  Negative for arthritis and joint swelling.   Gastrointestinal:  Negative for bloating, abdominal pain, hematemesis and hematochezia.   Neurological:  Negative for dizziness and focal weakness.   Psychiatric/Behavioral:  Negative for depression and suicidal ideas.        OBJECTIVE:   Vital Signs  Vitals:    01/17/24 1010   BP: 132/72   Pulse: 88   SpO2: 93%     Estimated body mass index is 35.88 kg/m² as calculated from the following:    Height as of this encounter: 175.3 cm (69\").    Weight as of this encounter: 110 kg (243 lb).    Vitals reviewed.   Constitutional:       Appearance: Healthy appearance. Not in distress.   Neck:      Vascular: No JVR. JVD normal.   Pulmonary:      Effort: Pulmonary effort is normal.      Breath sounds: Normal breath sounds. No wheezing. No rhonchi. No rales.   Chest:      Chest wall: Not tender to palpatation.   Cardiovascular:      PMI at left midclavicular line. Normal rate. Regular rhythm. Normal S1. Normal S2.       Murmurs: There is no murmur.      No gallop.  No click. No rub.   Pulses:     Intact distal pulses.   Edema:     Peripheral edema absent.   Abdominal:      General: Bowel sounds are normal.      Palpations: Abdomen is soft.      Tenderness: There is no abdominal tenderness.   Musculoskeletal: Normal range of motion.         General: No tenderness. Skin:     General: Skin is warm and dry.   Neurological:      General: No focal deficit present.      Mental Status: Alert and oriented to person, place and time.         Procedures    Lipid Panel          11/20/2023    06:16   Lipid Panel   Total Cholesterol 105    Triglycerides 101    HDL Cholesterol 40    VLDL Cholesterol 19    LDL Cholesterol  46    LDL/HDL Ratio 1.12         BUN   Date Value Ref Range Status   12/17/2023 12 8 - 23 mg/dL Final     Creatinine "   Date Value Ref Range Status   12/17/2023 0.73 (L) 0.76 - 1.27 mg/dL Final     Potassium   Date Value Ref Range Status   12/17/2023 3.8 3.5 - 5.2 mmol/L Final     ALT (SGPT)   Date Value Ref Range Status   12/16/2023 42 (H) 1 - 41 U/L Final     AST (SGOT)   Date Value Ref Range Status   12/16/2023 25 1 - 40 U/L Final           ASSESSMENT:     83-year-old male with known cardiac history presents for hospital follow-up of near syncope      PLAN OF CARE:     Near syncope -symptoms and clinical story were consistent with vertigo.  He has had no recurrent episode.  He was evaluated in vestibular PT and has follow-up as needed.  Monitor clinical progress.  Coronary artery disease -no angina with good functional capacity.  Blood pressure and heart rate are well-controlled today in clinic.  Continue aspirin and statin.  Last LDL 46.  Nonrheumatic aortic stenosis -mild to moderate on echocardiogram.  Will need repeat echocardiogram in 1 year.  No chest pain, heart failure symptoms or syncope.    Return to clinic 6 months             Discharge Medications            Accurate as of January 17, 2024 10:11 AM. If you have any questions, ask your nurse or doctor.                Continue These Medications        Instructions Start Date   aspirin 81 MG EC tablet   81 mg, Oral, Daily      atorvastatin 10 MG tablet  Commonly known as: LIPITOR   TAKE 1 TABLET BY MOUTH EVERY DAY AT NIGHT      carvedilol 6.25 MG tablet  Commonly known as: COREG   TAKE 1 TABLET BY MOUTH TWICE A DAY      famotidine 20 MG tablet  Commonly known as: PEPCID   TAKE 1 TABLET BY MOUTH EVERYDAY AT BEDTIME      finasteride 5 MG tablet  Commonly known as: PROSCAR   5 mg, Oral, Daily      glimepiride 2 MG tablet  Commonly known as: AMARYL   2 mg, Oral, Every Morning Before Breakfast      glucose blood test strip   1 each, Other, Daily PRN, Use as instructed      glucose monitor monitoring kit   1 each, Does not apply, Daily PRN      hydroCHLOROthiazide 25 MG  tablet  Commonly known as: HYDRODIURIL   25 mg, Oral, Daily      loratadine 10 MG tablet  Commonly known as: CLARITIN   TAKE 1 TABLET BY MOUTH EVERY DAY AS NEEDED FOR ALLERGIES      meclizine 25 MG tablet  Commonly known as: ANTIVERT   25 mg, Oral, Every 6 Hours PRN      multivitamin with minerals tablet tablet   1 tablet, Oral, Daily      nitroglycerin 0.4 MG SL tablet  Commonly known as: NITROSTAT   0.4 mg, Sublingual, Every 5 Minutes PRN, Take no more than 3 doses in 15 minutes.      ramipril 10 MG capsule  Commonly known as: ALTACE   TAKE 1 CAPSULE BY MOUTH TWICE A DAY      tamsulosin 0.4 MG capsule 24 hr capsule  Commonly known as: FLOMAX   0.4 mg, Oral, Nightly      tolterodine LA 4 MG 24 hr capsule  Commonly known as: DETROL LA   4 mg, Oral, Daily               Thank you for allowing me to participate in the care of your patient,      Sincerely,   Nato Michaud MD  [facility]  01/17/24  10:11 EST

## 2024-03-04 DIAGNOSIS — K21.9 GASTROESOPHAGEAL REFLUX DISEASE WITHOUT ESOPHAGITIS: ICD-10-CM

## 2024-03-04 DIAGNOSIS — I10 ESSENTIAL HYPERTENSION: ICD-10-CM

## 2024-03-04 RX ORDER — CARVEDILOL 6.25 MG/1
TABLET ORAL
Qty: 180 TABLET | Refills: 1 | Status: SHIPPED | OUTPATIENT
Start: 2024-03-04

## 2024-03-04 RX ORDER — FAMOTIDINE 20 MG/1
TABLET, FILM COATED ORAL
Qty: 90 TABLET | Refills: 3 | Status: SHIPPED | OUTPATIENT
Start: 2024-03-04

## 2024-03-06 RX ORDER — RAMIPRIL 10 MG/1
CAPSULE ORAL
Qty: 180 CAPSULE | Refills: 1 | Status: SHIPPED | OUTPATIENT
Start: 2024-03-06

## 2024-03-10 DIAGNOSIS — I10 ESSENTIAL HYPERTENSION: ICD-10-CM

## 2024-03-11 RX ORDER — HYDROCHLOROTHIAZIDE 25 MG/1
25 TABLET ORAL DAILY
Qty: 90 TABLET | Refills: 3 | Status: SHIPPED | OUTPATIENT
Start: 2024-03-11

## 2024-03-27 ENCOUNTER — PATIENT ROUNDING (BHMG ONLY) (OUTPATIENT)
Dept: INTERNAL MEDICINE | Facility: CLINIC | Age: 84
End: 2024-03-27
Payer: MEDICARE

## 2024-03-27 ENCOUNTER — OFFICE VISIT (OUTPATIENT)
Dept: INTERNAL MEDICINE | Facility: CLINIC | Age: 84
End: 2024-03-27
Payer: MEDICARE

## 2024-03-27 VITALS
BODY MASS INDEX: 37.09 KG/M2 | TEMPERATURE: 97.3 F | DIASTOLIC BLOOD PRESSURE: 72 MMHG | HEART RATE: 75 BPM | HEIGHT: 69 IN | OXYGEN SATURATION: 96 % | SYSTOLIC BLOOD PRESSURE: 122 MMHG | WEIGHT: 250.4 LBS

## 2024-03-27 DIAGNOSIS — I10 ESSENTIAL HYPERTENSION: Chronic | ICD-10-CM

## 2024-03-27 DIAGNOSIS — K21.9 GASTROESOPHAGEAL REFLUX DISEASE WITHOUT ESOPHAGITIS: ICD-10-CM

## 2024-03-27 DIAGNOSIS — R39.12 BENIGN PROSTATIC HYPERPLASIA WITH WEAK URINARY STREAM: ICD-10-CM

## 2024-03-27 DIAGNOSIS — N40.1 BENIGN PROSTATIC HYPERPLASIA WITH WEAK URINARY STREAM: ICD-10-CM

## 2024-03-27 DIAGNOSIS — E11.9 CONTROLLED TYPE 2 DIABETES MELLITUS WITHOUT COMPLICATION, WITHOUT LONG-TERM CURRENT USE OF INSULIN: Primary | Chronic | ICD-10-CM

## 2024-03-27 DIAGNOSIS — E78.2 MIXED HYPERLIPIDEMIA: Chronic | ICD-10-CM

## 2024-03-27 LAB
ALBUMIN SERPL-MCNC: 4.2 G/DL (ref 3.5–5.2)
ALBUMIN/GLOB SERPL: 2.5 G/DL
ALP SERPL-CCNC: 82 U/L (ref 39–117)
ALT SERPL-CCNC: 34 U/L (ref 1–41)
AST SERPL-CCNC: 23 U/L (ref 1–40)
BILIRUB SERPL-MCNC: 0.7 MG/DL (ref 0–1.2)
BUN SERPL-MCNC: 11 MG/DL (ref 8–23)
BUN/CREAT SERPL: 11.5 (ref 7–25)
CALCIUM SERPL-MCNC: 9.2 MG/DL (ref 8.6–10.5)
CHLORIDE SERPL-SCNC: 100 MMOL/L (ref 98–107)
CO2 SERPL-SCNC: 27.3 MMOL/L (ref 22–29)
CREAT SERPL-MCNC: 0.96 MG/DL (ref 0.76–1.27)
EGFRCR SERPLBLD CKD-EPI 2021: 78.4 ML/MIN/1.73
GLOBULIN SER CALC-MCNC: 1.7 GM/DL
GLUCOSE SERPL-MCNC: 153 MG/DL (ref 65–99)
HBA1C MFR BLD: 7.1 % (ref 4.8–5.6)
POTASSIUM SERPL-SCNC: 4.5 MMOL/L (ref 3.5–5.2)
PROT SERPL-MCNC: 5.9 G/DL (ref 6–8.5)
SODIUM SERPL-SCNC: 136 MMOL/L (ref 136–145)

## 2024-03-27 NOTE — PROGRESS NOTES
"Chief Complaint  Establish Care, Diabetes, and Hypertension    Subjective        Shady Newell presents to [facility] PRIMARY [facility]  History of Present Illness    Mr. Newell presents to clinic today as a new patient to establish care for management of chronic medical conditions    He has no specific acute complaints or concerns today.  He does report that he has episodic vertigo in which he sees physical therapist at his current living facility.  Additionally he does report of some intermittent constipation as well as indigestion that does not cause significant associated symptoms          Review of Systems   Respiratory:  Negative for shortness of breath.    Cardiovascular:  Negative for chest pain and palpitations.   Gastrointestinal:  Negative for abdominal pain, constipation and diarrhea.        +indigestion        Objective   Vital Signs:  /72   Pulse 75   Temp 97.3 °F (36.3 °C) (Temporal)   Ht 175.3 cm (69\")   Wt 114 kg (250 lb 6.4 oz)   SpO2 96%   BMI 36.98 kg/m²   Estimated body mass index is 36.98 kg/m² as calculated from the following:    Height as of this encounter: 175.3 cm (69\").    Weight as of this encounter: 114 kg (250 lb 6.4 oz).               Physical Exam  Vitals reviewed.   Constitutional:       General: He is not in acute distress.     Appearance: Normal appearance. He is obese. He is not toxic-appearing.   HENT:      Head: Normocephalic and atraumatic.   Eyes:      General: No scleral icterus.     Conjunctiva/sclera: Conjunctivae normal.   Cardiovascular:      Rate and Rhythm: Normal rate and regular rhythm.      Heart sounds: Murmur heard.   Pulmonary:      Effort: Pulmonary effort is normal. No respiratory distress.      Breath sounds: Normal breath sounds. No wheezing or rales.   Skin:     General: Skin is warm and dry.   Neurological:      Mental Status: He is alert and oriented to person, place, and time.   Psychiatric:         Mood and Affect: Mood " normal.         Behavior: Behavior normal.        Result Review :  The following data was reviewed by: Rusty Izaguirre MD on 03/27/2024:  Lipid Panel          11/20/2023    06:16   Lipid Panel   Total Cholesterol 105    Triglycerides 101    HDL Cholesterol 40    VLDL Cholesterol 19    LDL Cholesterol  46    LDL/HDL Ratio 1.12      BMP          11/20/2023    06:16 12/16/2023    07:49 12/17/2023    04:42   BMP   BUN 11  11  12    Creatinine 0.86  0.77  0.73    Sodium 138  139  136    Potassium 4.6  4.0  3.8    Chloride 101  101  101    CO2 27.9  27.4  23.9    Calcium 9.4  9.2  9.1      Most Recent A1C          11/20/2023    06:16   HGBA1C Most Recent   Hemoglobin A1C 7.10                   Assessment and Plan   Diagnoses and all orders for this visit:    1. Controlled type 2 diabetes mellitus without complication, without long-term current use of insulin (Primary)  -     Hemoglobin A1c  -     Comprehensive Metabolic Panel    2. Essential hypertension    3. Mixed hyperlipidemia    4. Benign prostatic hyperplasia with weak urinary stream    5. Gastroesophageal reflux disease without esophagitis      Reviewed previous PCP OV dating back to 2023, cardiology OV and most recent labs    Overall doing very well given his age and comorbidities    T2DM appears well controlled, only on glimepiride. Will recheck A1c today    HTN at goal, continue current regimen.     GERD is chronic, stable reports intermittent episodes of indigestion. Discussed can trial tums for symptomatic relief, can discuss increasing or changing antacid if persistent     RTC in 3mo, T2DM          Follow Up   Return in about 3 months (around 6/27/2024).  Patient was given instructions and counseling regarding his condition or for health maintenance advice. Please see specific information pulled into the AVS if appropriate.

## 2024-03-27 NOTE — PROGRESS NOTES
March 27, 2024    Hello, may I speak with Shady Newell?    My name is Miryam       I am  with [facility]  [address]  [facility] [address].    Before we get started may I verify your date of birth? 1940    I am calling to officially welcome you to our practice and ask about your recent visit. Is this a good time to talk? yes    Tell me about your visit with us. What things went well?  Really liked him        We're always looking for ways to make our patients' experiences even better. Do you have recommendations on ways we may improve?  no    Overall were you satisfied with your first visit to our practice? yes       I appreciate you taking the time to speak with me today. Is there anything else I can do for you? no      Thank you, and have a great day.

## 2024-04-24 ENCOUNTER — OFFICE VISIT (OUTPATIENT)
Dept: INTERNAL MEDICINE | Facility: CLINIC | Age: 84
End: 2024-04-24
Payer: MEDICARE

## 2024-04-24 VITALS
HEART RATE: 81 BPM | HEIGHT: 69 IN | OXYGEN SATURATION: 93 % | SYSTOLIC BLOOD PRESSURE: 132 MMHG | DIASTOLIC BLOOD PRESSURE: 78 MMHG | WEIGHT: 294 LBS | TEMPERATURE: 99 F | BODY MASS INDEX: 43.55 KG/M2

## 2024-04-24 DIAGNOSIS — R51.9 OCCIPITAL HEADACHE: Primary | ICD-10-CM

## 2024-04-24 PROCEDURE — 3075F SYST BP GE 130 - 139MM HG: CPT | Performed by: STUDENT IN AN ORGANIZED HEALTH CARE EDUCATION/TRAINING PROGRAM

## 2024-04-24 PROCEDURE — 1159F MED LIST DOCD IN RCRD: CPT | Performed by: STUDENT IN AN ORGANIZED HEALTH CARE EDUCATION/TRAINING PROGRAM

## 2024-04-24 PROCEDURE — 3078F DIAST BP <80 MM HG: CPT | Performed by: STUDENT IN AN ORGANIZED HEALTH CARE EDUCATION/TRAINING PROGRAM

## 2024-04-24 PROCEDURE — 99213 OFFICE O/P EST LOW 20 MIN: CPT | Performed by: STUDENT IN AN ORGANIZED HEALTH CARE EDUCATION/TRAINING PROGRAM

## 2024-04-24 PROCEDURE — 1160F RVW MEDS BY RX/DR IN RCRD: CPT | Performed by: STUDENT IN AN ORGANIZED HEALTH CARE EDUCATION/TRAINING PROGRAM

## 2024-04-24 NOTE — PROGRESS NOTES
"Chief Complaint  Neck Pain and Headache    Subjective        Shady Newell presents to [facility] PRIMARY [facility]  History of Present Illness    Mr. Newell presents to clinic today with complaints of headache and neck pain    He reports that he woke up 2 days ago with sharp pain in the back of his head as well as a stiff neck.  He states that it got better throughout the day however he woke up this morning and it was slightly worse.  He describes it as sharp and rates it 5 out of 10 and it seems to get better as the day goes on.  He denies any numbness or tingling, facial droop, visual changes.    Review of Systems   Constitutional:  Negative for fever.   HENT:  Negative for hearing loss.    Eyes:  Negative for visual disturbance.   Neurological:  Negative for dizziness, facial asymmetry, speech difficulty, light-headedness and numbness.        Objective   Vital Signs:  /78   Pulse 81   Temp 99 °F (37.2 °C)   Ht 175.3 cm (69\")   Wt 133 kg (294 lb)   SpO2 93%   BMI 43.42 kg/m²   Estimated body mass index is 43.42 kg/m² as calculated from the following:    Height as of this encounter: 175.3 cm (69\").    Weight as of this encounter: 133 kg (294 lb).               Physical Exam  Vitals reviewed.   Constitutional:       General: He is not in acute distress.     Appearance: Normal appearance. He is not toxic-appearing.   HENT:      Head: Normocephalic and atraumatic.   Eyes:      General: No scleral icterus.     Conjunctiva/sclera: Conjunctivae normal.   Neck:      Vascular: No carotid bruit.   Musculoskeletal:      Cervical back: No edema or erythema. Pain with movement present. No spinous process tenderness or muscular tenderness.   Skin:     General: Skin is warm and dry.   Neurological:      Mental Status: He is alert and oriented to person, place, and time.      Cranial Nerves: Cranial nerves 2-12 are intact. No cranial nerve deficit, dysarthria or facial asymmetry.      Motor: No " weakness.   Psychiatric:         Mood and Affect: Mood normal.         Behavior: Behavior normal.        Result Review :                   Assessment and Plan   Diagnoses and all orders for this visit:    1. Occipital headache (Primary)      2d hx of morning occipital headache, improves throughout the day. Exam shows no CN deficits, full ROM with head turning however does report some pain with this. Suspect tension type vs muscle spasm given pain with neck movements. Counseled NSAIDs, recommended checking BP in morning to r/o uncontrolled HTN.     If persistent or worsening, advised to RTC or let us know and can trial low dose muscle relaxer or consider further workup with imaging however do not feel we need to get imaging at this point        Follow Up   No follow-ups on file.  Patient was given instructions and counseling regarding his condition or for health maintenance advice. Please see specific information pulled into the AVS if appropriate.

## 2024-04-26 ENCOUNTER — OFFICE VISIT (OUTPATIENT)
Dept: INTERNAL MEDICINE | Facility: CLINIC | Age: 84
End: 2024-04-26
Payer: MEDICARE

## 2024-04-26 VITALS
HEIGHT: 69 IN | SYSTOLIC BLOOD PRESSURE: 140 MMHG | DIASTOLIC BLOOD PRESSURE: 80 MMHG | WEIGHT: 248 LBS | BODY MASS INDEX: 36.73 KG/M2 | HEART RATE: 87 BPM | TEMPERATURE: 98.2 F

## 2024-04-26 DIAGNOSIS — I10 ESSENTIAL HYPERTENSION: Primary | Chronic | ICD-10-CM

## 2024-04-26 PROCEDURE — 3077F SYST BP >= 140 MM HG: CPT | Performed by: PHYSICIAN ASSISTANT

## 2024-04-26 PROCEDURE — 3079F DIAST BP 80-89 MM HG: CPT | Performed by: PHYSICIAN ASSISTANT

## 2024-04-26 PROCEDURE — 1159F MED LIST DOCD IN RCRD: CPT | Performed by: PHYSICIAN ASSISTANT

## 2024-04-26 PROCEDURE — 1160F RVW MEDS BY RX/DR IN RCRD: CPT | Performed by: PHYSICIAN ASSISTANT

## 2024-04-26 PROCEDURE — 99213 OFFICE O/P EST LOW 20 MIN: CPT | Performed by: PHYSICIAN ASSISTANT

## 2024-04-26 RX ORDER — RAMIPRIL 10 MG/1
10 CAPSULE ORAL DAILY
Start: 2024-04-26

## 2024-04-26 RX ORDER — CARVEDILOL 12.5 MG/1
12.5 TABLET ORAL 2 TIMES DAILY WITH MEALS
Qty: 60 TABLET | Refills: 0 | Status: SHIPPED | OUTPATIENT
Start: 2024-04-26

## 2024-04-26 NOTE — PROGRESS NOTES
Subjective   Chief Complaint   Patient presents with    Hypertension       History of Present Illness      Pt here today with elevated BP readings. He states his BP was 142/90 this morning prior to taking his meds. Has also had a reading in the 180s'/90s. Headache is a little better. No CP or SOA.     Patient Active Problem List   Diagnosis    Coronary artery disease involving native coronary artery of native heart without angina pectoris    Mixed hyperlipidemia    Essential hypertension    Severe obesity with body mass index (BMI) of 35.0 to 39.9 with serious comorbidity    Primary osteoarthritis involving multiple joints    Benign prostatic hyperplasia with weak urinary stream    Gastroesophageal reflux disease without esophagitis    Controlled type 2 diabetes mellitus without complication, without long-term current use of insulin    Seasonal allergies    Systolic murmur    Full thickness rotator cuff tear    Elevated transaminase level    Glaucoma suspect of both eyes    Nonexudative age-related macular degeneration, bilateral, early dry stage    Meibomian gland dysfunction (MGD)    History of COVID-19    Urge incontinence of urine    Dizziness       Allergies   Allergen Reactions    Other      Dual antiplatelet therapy, spontaneous bruising as of June 2010       Current Outpatient Medications on File Prior to Visit   Medication Sig Dispense Refill    aspirin 81 MG EC tablet Take 1 tablet by mouth Daily. 1 tablet 0    atorvastatin (LIPITOR) 10 MG tablet TAKE 1 TABLET BY MOUTH EVERY DAY AT NIGHT 90 tablet 3    famotidine (PEPCID) 20 MG tablet TAKE 1 TABLET BY MOUTH EVERYDAY AT BEDTIME 90 tablet 3    finasteride (PROSCAR) 5 MG tablet Take 1 tablet by mouth Daily. 90 tablet 1    glimepiride (AMARYL) 2 MG tablet TAKE 1 TABLET BY MOUTH EVERY DAY BEFORE BREAKFAST 90 tablet 1    glucose blood test strip 1 each by Other route Daily As Needed (Blood sugar monitoring). Use as instructed 100 each 12    glucose monitor  monitoring kit 1 each Daily As Needed (Blood sugar monitoring). 1 each 0    hydroCHLOROthiazide 25 MG tablet TAKE 1 TABLET BY MOUTH EVERY DAY 90 tablet 3    Mirabegron (MYRBETRIQ PO) Take  by mouth.      Multiple Vitamins-Minerals (MULTIVITAMIN ADULT PO) Take 1 tablet by mouth Daily.      nitroglycerin (NITROSTAT) 0.4 MG SL tablet Place 1 tablet under the tongue Every 5 (Five) Minutes As Needed for Chest Pain. Take no more than 3 doses in 15 minutes. 30 tablet 1    tamsulosin (FLOMAX) 0.4 MG capsule 24 hr capsule Take 1 capsule by mouth Every Night. 90 capsule 1    loratadine (CLARITIN) 10 MG tablet TAKE 1 TABLET BY MOUTH EVERY DAY AS NEEDED FOR ALLERGIES 90 tablet 1    [DISCONTINUED] carvedilol (COREG) 6.25 MG tablet TAKE 1 TABLET BY MOUTH TWICE A  tablet 1    [DISCONTINUED] ramipril (ALTACE) 10 MG capsule TAKE 1 CAPSULE BY MOUTH TWICE A  capsule 1     No current facility-administered medications on file prior to visit.       Past Medical History:   Diagnosis Date    Allergic 1945    seasonal    BPH (benign prostatic hyperplasia)     Cataract     CHF (congestive heart failure) ?    Controlled type 2 diabetes mellitus without complication, without long-term current use of insulin 03/04/2021 2017 A1c 6.4 2021 A1c 7.34    Coronary artery disease involving native coronary artery of native heart without angina pectoris 07/06/2016    Dyslipidemia     Elevated transaminase level     Essential hypertension     GERD (gastroesophageal reflux disease)     History of coronary angioplasty with insertion of stent 2006    Stent 2006 and 2016    HL (hearing loss)     Mixed hyperlipidemia     Obesity     Primary osteoarthritis involving multiple joints 07/06/2016    Primary osteoarthritis of right knee     Systolic murmur 03/04/2021    3/2021: Grade 2/6 R IC space       Family History   Problem Relation Age of Onset    Heart disease Mother     Osteoarthritis Mother     Hypertension Mother     Heart attack Mother           1991    Arthritis Mother     Hypertension Father     Heart disease Father     Stroke Father     Cancer Father         Prostate    Hypertension Other     Cancer Other     Heart disease Brother         ATRIAL FIBRILLATION       Social History     Socioeconomic History    Marital status:    Tobacco Use    Smoking status: Former     Current packs/day: 0.00     Average packs/day: 1 pack/day for 11.5 years (11.5 ttl pk-yrs)     Types: Cigarettes     Start date: 1958     Quit date: 1969     Years since quittin.8     Passive exposure: Past    Smokeless tobacco: Never   Vaping Use    Vaping status: Never Used   Substance and Sexual Activity    Alcohol use: Yes     Alcohol/week: 6.0 standard drinks of alcohol     Types: 6 Shots of liquor per week     Comment: weekly;  caffeine use- denies    Drug use: Never    Sexual activity: Not Currently     Partners: Female     Birth control/protection: Condom, Diaphragm, Vasectomy, Pill       Past Surgical History:   Procedure Laterality Date    CARDIAC CATHETERIZATION N/A 2017    Procedure: Left Heart Cath;  Surgeon: Nash Oliver MD;  Location: [facility] CATH INVASIVE LOCATION;  Service:     CATARACT EXTRACTION      2015    CORONARY ANGIOPLASTY WITH STENT PLACEMENT      CORONARY STENT PLACEMENT      HERNIA REPAIR      KNEE ARTHROSCOPY      ROTATOR CUFF REPAIR Left     ROTATOR CUFF REPAIR Right 2015    SHOULDER SURGERY      VASECTOMY       The following portions of the patient's history were reviewed and updated as appropriate: problem list, allergies, current medications, past medical history, past family history, past social history, and past surgical history.    ROS    See HPI    Immunization History   Administered Date(s) Administered    Arexvy (RSV, Adults 60+ yrs) 10/02/2023    COVID-19 (PFIZER) BIVALENT 12+YRS 10/21/2022, 2023    COVID-19 (PFIZER) Purple Cap Monovalent 2021, 2021,  "09/27/2021, 04/21/2022    COVID-19 F23 (PFIZER) 12YRS+ (COMIRNATY) 10/02/2023    FLUAD TRI 65YR+ 08/19/2019    Flu Vaccine Quad PF >36MO 10/03/2022    Fluad Quad 65+ 09/07/2021, 10/02/2023    Fluzone Quad >6mos (Multi-dose) 09/07/2021    Hepatitis A 10/23/2018, 08/13/2019    Hepatitis B Adult/Adolescent IM 02/03/2009, 08/06/2009    Influenza Quad Vaccine (Inpatient) 09/17/2010    Influenza, Unspecified 09/15/2018, 08/19/2019, 08/18/2020, 10/02/2023    Pneumococcal Conjugate 13-Valent (PCV13) 10/04/2016    Pneumococcal Conjugate 20-Valent (PCV20) 10/03/2022    Pneumococcal Polysaccharide (PPSV23) 09/22/2006, 10/23/2018    Shingrix 06/26/2019, 06/26/2019, 07/24/2019    Tdap 10/21/2022    Tetanus 06/28/2000       Objective   Vitals:    04/26/24 0957 04/26/24 1009   BP: 140/80    Pulse:  87   Temp: 98.2 °F (36.8 °C)    Weight: 112 kg (248 lb)    Height: 175.3 cm (69.02\")    Pt's weight entered incorrectly on 4/24/24.   Body mass index is 36.61 kg/m².  Physical Exam  Vitals reviewed.   Constitutional:       Appearance: He is well-developed.   HENT:      Head: Normocephalic and atraumatic.   Cardiovascular:      Rate and Rhythm: Normal rate and regular rhythm.      Heart sounds: S1 normal and S2 normal. Murmur heard.   Pulmonary:      Effort: Pulmonary effort is normal.      Breath sounds: Normal breath sounds.   Skin:     General: Skin is warm.   Neurological:      Mental Status: He is alert.   Psychiatric:         Behavior: Behavior normal.           Assessment & Plan   Diagnoses and all orders for this visit:    1. Essential hypertension (Primary)  -     carvedilol (Coreg) 12.5 MG tablet; Take 1 tablet by mouth 2 (Two) Times a Day With Meals.  Dispense: 60 tablet; Refill: 0  -     ramipril (Altace) 10 MG capsule; Take 1 capsule by mouth Daily.     Increase Carvedilol from 6.25 mg BID to 12.5 mg BID. Continue to monitor BP daily. FUP in 3 weeks with Dr. Izaguirre.                  "

## 2024-05-10 ENCOUNTER — OFFICE VISIT (OUTPATIENT)
Dept: INTERNAL MEDICINE | Facility: CLINIC | Age: 84
End: 2024-05-10
Payer: MEDICARE

## 2024-05-10 VITALS
OXYGEN SATURATION: 97 % | DIASTOLIC BLOOD PRESSURE: 78 MMHG | SYSTOLIC BLOOD PRESSURE: 140 MMHG | HEART RATE: 67 BPM | TEMPERATURE: 98 F | HEIGHT: 69 IN | WEIGHT: 251.8 LBS | BODY MASS INDEX: 37.29 KG/M2

## 2024-05-10 DIAGNOSIS — I10 ESSENTIAL HYPERTENSION: Primary | ICD-10-CM

## 2024-05-10 PROCEDURE — 3078F DIAST BP <80 MM HG: CPT | Performed by: STUDENT IN AN ORGANIZED HEALTH CARE EDUCATION/TRAINING PROGRAM

## 2024-05-10 PROCEDURE — 1126F AMNT PAIN NOTED NONE PRSNT: CPT | Performed by: STUDENT IN AN ORGANIZED HEALTH CARE EDUCATION/TRAINING PROGRAM

## 2024-05-10 PROCEDURE — 99213 OFFICE O/P EST LOW 20 MIN: CPT | Performed by: STUDENT IN AN ORGANIZED HEALTH CARE EDUCATION/TRAINING PROGRAM

## 2024-05-10 PROCEDURE — 3077F SYST BP >= 140 MM HG: CPT | Performed by: STUDENT IN AN ORGANIZED HEALTH CARE EDUCATION/TRAINING PROGRAM

## 2024-05-10 RX ORDER — TOLTERODINE 4 MG/1
1 CAPSULE, EXTENDED RELEASE ORAL DAILY
COMMUNITY
Start: 2024-04-19

## 2024-05-22 DIAGNOSIS — I10 ESSENTIAL HYPERTENSION: Chronic | ICD-10-CM

## 2024-05-22 RX ORDER — CARVEDILOL 12.5 MG/1
12.5 TABLET ORAL 2 TIMES DAILY WITH MEALS
Qty: 60 TABLET | Refills: 0 | Status: SHIPPED | OUTPATIENT
Start: 2024-05-22 | End: 2024-05-24 | Stop reason: SDUPTHER

## 2024-05-22 NOTE — TELEPHONE ENCOUNTER
PATIENT CALLED FOR MEDICATION REFILL OF  carvedilol (Coreg) 12.5 MG tablet     [facility] [address] [place] [address] - [phone] PH - [phone]     LAST MONTH A PRESCRIPTION WAS CALLED IN FOR 30 DAYS.     HE IS REQUESTING A 90 DAY SUPPLY.     CALL BACK NUMBER [phone]

## 2024-05-24 DIAGNOSIS — I10 ESSENTIAL HYPERTENSION: Chronic | ICD-10-CM

## 2024-05-24 RX ORDER — CARVEDILOL 12.5 MG/1
12.5 TABLET ORAL 2 TIMES DAILY WITH MEALS
Qty: 60 TABLET | Refills: 0 | Status: SHIPPED | OUTPATIENT
Start: 2024-05-24

## 2024-05-29 ENCOUNTER — OFFICE VISIT (OUTPATIENT)
Dept: INTERNAL MEDICINE | Facility: CLINIC | Age: 84
End: 2024-05-29
Payer: MEDICARE

## 2024-05-29 VITALS
WEIGHT: 249.6 LBS | OXYGEN SATURATION: 94 % | BODY MASS INDEX: 36.97 KG/M2 | HEART RATE: 74 BPM | DIASTOLIC BLOOD PRESSURE: 72 MMHG | TEMPERATURE: 97.1 F | HEIGHT: 69 IN | SYSTOLIC BLOOD PRESSURE: 124 MMHG

## 2024-05-29 DIAGNOSIS — K40.90 INGUINAL HERNIA OF LEFT SIDE WITHOUT OBSTRUCTION OR GANGRENE: Primary | ICD-10-CM

## 2024-05-29 PROCEDURE — 99213 OFFICE O/P EST LOW 20 MIN: CPT | Performed by: STUDENT IN AN ORGANIZED HEALTH CARE EDUCATION/TRAINING PROGRAM

## 2024-05-29 PROCEDURE — 1126F AMNT PAIN NOTED NONE PRSNT: CPT | Performed by: STUDENT IN AN ORGANIZED HEALTH CARE EDUCATION/TRAINING PROGRAM

## 2024-05-29 PROCEDURE — 3074F SYST BP LT 130 MM HG: CPT | Performed by: STUDENT IN AN ORGANIZED HEALTH CARE EDUCATION/TRAINING PROGRAM

## 2024-05-29 PROCEDURE — 1160F RVW MEDS BY RX/DR IN RCRD: CPT | Performed by: STUDENT IN AN ORGANIZED HEALTH CARE EDUCATION/TRAINING PROGRAM

## 2024-05-29 PROCEDURE — 3078F DIAST BP <80 MM HG: CPT | Performed by: STUDENT IN AN ORGANIZED HEALTH CARE EDUCATION/TRAINING PROGRAM

## 2024-05-29 PROCEDURE — 1159F MED LIST DOCD IN RCRD: CPT | Performed by: STUDENT IN AN ORGANIZED HEALTH CARE EDUCATION/TRAINING PROGRAM

## 2024-05-29 RX ORDER — FLUOROURACIL 50 MG/G
1 CREAM TOPICAL 2 TIMES DAILY
COMMUNITY

## 2024-05-29 NOTE — PROGRESS NOTES
"Chief Complaint  Hernia    Subjective        Shady Newell presents to [facility] PR[facility]  History of Present Illness    Mr. Newell presents to clinic today with right groin discomfort concern for hernia.  He reports this past Saturday he was going to the restroom and having a bowel movement and felt a bulge in his right groin upon straining.  He states that he had to keep pressure on it to prevent it from coming out of his inguinal region.  He has only had 1 bowel movement since then but denies any significant pain, inability to reduce, or redness of the area.  He did have a hernia on his left side over 20 years ago with similar symptoms      Review of Systems   Constitutional:  Negative for fever.   Gastrointestinal:  Positive for abdominal pain. Negative for constipation, diarrhea, nausea and vomiting.   Genitourinary:  Negative for dysuria, frequency and hematuria.   Musculoskeletal:  Negative for arthralgias and myalgias.        Objective   Vital Signs:  /72   Pulse 74   Temp 97.1 °F (36.2 °C) (Temporal)   Ht 175.3 cm (69.02\")   Wt 113 kg (249 lb 9.6 oz)   SpO2 94%   BMI 36.84 kg/m²   Estimated body mass index is 36.84 kg/m² as calculated from the following:    Height as of this encounter: 175.3 cm (69.02\").    Weight as of this encounter: 113 kg (249 lb 9.6 oz).               Physical Exam  Vitals reviewed.   Constitutional:       General: He is not in acute distress.     Appearance: Normal appearance. He is not toxic-appearing.   HENT:      Head: Normocephalic and atraumatic.   Eyes:      General: No scleral icterus.     Conjunctiva/sclera: Conjunctivae normal.   Abdominal:      Tenderness: There is no abdominal tenderness.      Hernia: A hernia is present. Hernia is present in the umbilical area and right inguinal area.      Comments: Reducible, right inguinal bulge upon valsalva, no erythema, tenderness to palpation    Skin:     General: Skin is warm and dry. "   Neurological:      Mental Status: He is alert and oriented to person, place, and time.   Psychiatric:         Mood and Affect: Mood normal.         Behavior: Behavior normal.        Result Review :                   Assessment and Plan   Diagnoses and all orders for this visit:    1. Inguinal hernia of left side without obstruction or gangrene (Primary)  -     Ambulatory Referral to General Surgery      Exam demonstrates reducible right inguinal hernia with no obstruction or gangrene, signs of strangulation/incarceration. Will refer to general surgery for further evaluation, counseled patient on red flag sx/s for strangulation/incarceration to go to ED for immediate evaluation            Follow Up   No follow-ups on file.  Patient was given instructions and counseling regarding his condition or for health maintenance advice. Please see specific information pulled into the AVS if appropriate.

## 2024-06-12 ENCOUNTER — OFFICE VISIT (OUTPATIENT)
Dept: SURGERY | Facility: CLINIC | Age: 84
End: 2024-06-12
Payer: MEDICARE

## 2024-06-12 VITALS
BODY MASS INDEX: 36.64 KG/M2 | DIASTOLIC BLOOD PRESSURE: 82 MMHG | SYSTOLIC BLOOD PRESSURE: 140 MMHG | WEIGHT: 247.4 LBS | HEIGHT: 69 IN

## 2024-06-12 DIAGNOSIS — K40.90 RIGHT INGUINAL HERNIA: Primary | ICD-10-CM

## 2024-06-12 PROCEDURE — 1160F RVW MEDS BY RX/DR IN RCRD: CPT | Performed by: SURGERY

## 2024-06-12 PROCEDURE — 1159F MED LIST DOCD IN RCRD: CPT | Performed by: SURGERY

## 2024-06-12 PROCEDURE — 99203 OFFICE O/P NEW LOW 30 MIN: CPT | Performed by: SURGERY

## 2024-06-12 PROCEDURE — 3079F DIAST BP 80-89 MM HG: CPT | Performed by: SURGERY

## 2024-06-12 PROCEDURE — 3077F SYST BP >= 140 MM HG: CPT | Performed by: SURGERY

## 2024-06-12 NOTE — PROGRESS NOTES
Cc: Possible right inguinal hernia    History of presenting illness:   This is a nice 83-year-old gentleman who says that around 2 to 3 weeks ago he noted what he perceived as a bulge in the right groin.  No real pain associated with this.  He had a prior open left inguinal hernia repair around 35 years ago, and says that this does not feel exactly similar.  He thinks it is more prominent when he is coughing or having a bowel movement.  No radiation of discomfort.    Past Medical History: Coronary artery disease, type 2 diabetes, obesity, benign prostatic hypertrophy, hyperlipidemia, hypertension    Past Surgical History: Cardiac catheterization and stent placement, I believe most recently 2017.  1988 open left inguinal hernia repair.  Right rotator cuff repair.  Left rotator cuff repair.  Knee arthroscopy.    Medications: Aspirin, Lipitor, Coreg, Pepcid, Proscar, hydrochlorothiazide, Claritin, ramipril, Flomax, Detrol    Allergies: No known medication allergies    Social History: He is a former smoker who quit in 1969    Family History: Negative for colon or rectal cancer    Review of Systems:  Constitutional: Denies fever, chills, change in weight  Neck: no swollen glands or dysphagia or odynophagia  Respiratory: negative for SOB, cough, hemoptysis or wheezing  Cardiovascular: negative for chest pain, palpitations or peripheral edema  Gastrointestinal: Denies change in bowel habits, nausea, vomiting      Physical Exam:  BMI: 36.5  General: alert and oriented, appropriate, no acute distress  Eyes: No scleral icterus, extraocular movements are intact  Neck: Supple without lymphadenopathy or thyromegaly, trachea is in the midline  Respiratory: There is good bilateral chest expansion, no use of accessory muscles is noted  Cardiovascular: No jugular venous distention or peripheral edema is seen  Gastrointestinal: Soft and benign, no ventral hernia appreciated  Genitourinary: There is tenderness in the right groin.   There may be a hernia, although it is not clear and even with Valsalva there is no obvious bulge.  There is probably at least a dilated external ring.    Laboratory data: Most recent hemoglobin A1c is 7.1    Imaging data: No recent relevant data      Assessment and plan:   -Right groin pain, possible bulge and concern for right inguinal hernia  -On clinical grounds this is not immediately obvious and he has minimal symptoms  -As such I have recommended further imaging before proceeding with surgery, particular given his age and other comorbidities  -Once CT images are returned I will review them with him  -He is potentially a candidate for minimally invasive da Analilia robot procedure  -If there is a hernia present at this time, it is certainly a low risk hernia      Montana Dominguez MD, FACS  General, Minimally Invasive and Endoscopic Surgery  [facility]    [address], [address]  [place], [place], [address]  P: [phone]  F: [phone]

## 2024-06-27 ENCOUNTER — TELEPHONE (OUTPATIENT)
Dept: SURGERY | Facility: CLINIC | Age: 84
End: 2024-06-27
Payer: MEDICARE

## 2024-06-27 NOTE — TELEPHONE ENCOUNTER
Attempted to contact patient to inform CT is scheduled 7/9 arrive 415pm to start 430pm at [address] [place]  [address] . Only have liquids 4 hrs prior to exam.   Left voicemail to call back.

## 2024-06-28 ENCOUNTER — OFFICE VISIT (OUTPATIENT)
Dept: INTERNAL MEDICINE | Facility: CLINIC | Age: 84
End: 2024-06-28
Payer: MEDICARE

## 2024-06-28 VITALS
BODY MASS INDEX: 36.53 KG/M2 | HEIGHT: 69 IN | SYSTOLIC BLOOD PRESSURE: 126 MMHG | TEMPERATURE: 99.6 F | DIASTOLIC BLOOD PRESSURE: 78 MMHG | WEIGHT: 246.6 LBS

## 2024-06-28 DIAGNOSIS — E11.9 CONTROLLED TYPE 2 DIABETES MELLITUS WITHOUT COMPLICATION, WITHOUT LONG-TERM CURRENT USE OF INSULIN: ICD-10-CM

## 2024-06-28 DIAGNOSIS — I10 ESSENTIAL HYPERTENSION: Primary | ICD-10-CM

## 2024-06-28 PROCEDURE — 1126F AMNT PAIN NOTED NONE PRSNT: CPT | Performed by: STUDENT IN AN ORGANIZED HEALTH CARE EDUCATION/TRAINING PROGRAM

## 2024-06-28 PROCEDURE — 3078F DIAST BP <80 MM HG: CPT | Performed by: STUDENT IN AN ORGANIZED HEALTH CARE EDUCATION/TRAINING PROGRAM

## 2024-06-28 PROCEDURE — 3074F SYST BP LT 130 MM HG: CPT | Performed by: STUDENT IN AN ORGANIZED HEALTH CARE EDUCATION/TRAINING PROGRAM

## 2024-06-28 PROCEDURE — 99214 OFFICE O/P EST MOD 30 MIN: CPT | Performed by: STUDENT IN AN ORGANIZED HEALTH CARE EDUCATION/TRAINING PROGRAM

## 2024-06-28 PROCEDURE — G2211 COMPLEX E/M VISIT ADD ON: HCPCS | Performed by: STUDENT IN AN ORGANIZED HEALTH CARE EDUCATION/TRAINING PROGRAM

## 2024-06-28 NOTE — PROGRESS NOTES
"Chief Complaint  Hypertension    Subjective        Shady Newell presents to [facility] PRIMARY [facility]ARE  History of Present Illness    Mr. Newell presents to clinic for follow-up of high blood pressure    He reports his blood pressure has been very well-controlled since adjustments made at last visit.  He reports readings largely under 140 systolics and denies any lightheadedness or headaches    Review of Systems   Respiratory:  Negative for shortness of breath.    Cardiovascular:  Negative for chest pain and palpitations.        Objective   Vital Signs:  /78   Temp 99.6 °F (37.6 °C) (Temporal)   Ht 175.3 cm (69\")   Wt 112 kg (246 lb 9.6 oz)   BMI 36.42 kg/m²   Estimated body mass index is 36.42 kg/m² as calculated from the following:    Height as of this encounter: 175.3 cm (69\").    Weight as of this encounter: 112 kg (246 lb 9.6 oz).               Physical Exam  Vitals reviewed.   Constitutional:       General: He is not in acute distress.     Appearance: Normal appearance. He is not toxic-appearing.   HENT:      Head: Normocephalic and atraumatic.   Eyes:      General: No scleral icterus.     Conjunctiva/sclera: Conjunctivae normal.   Skin:     General: Skin is warm and dry.   Neurological:      Mental Status: He is alert and oriented to person, place, and time.   Psychiatric:         Mood and Affect: Mood normal.         Behavior: Behavior normal.        Result Review :                   Assessment and Plan   Diagnoses and all orders for this visit:    1. Essential hypertension (Primary)  -     Comprehensive Metabolic Panel; Future    2. Controlled type 2 diabetes mellitus without complication, without long-term current use of insulin  -     Hemoglobin A1c; Future  -     Comprehensive Metabolic Panel; Future      HTN is chronic, stable and at goal today and home readings good. Continue current regimen    Will obtain labs prior to next appt for diabetes check            Follow Up "   Return in about 3 months (around 9/28/2024).  Patient was given instructions and counseling regarding his condition or for health maintenance advice. Please see specific information pulled into the AVS if appropriate.

## 2024-07-08 ENCOUNTER — OFFICE VISIT (OUTPATIENT)
Dept: INTERNAL MEDICINE | Facility: CLINIC | Age: 84
End: 2024-07-08
Payer: MEDICARE

## 2024-07-08 VITALS
SYSTOLIC BLOOD PRESSURE: 128 MMHG | TEMPERATURE: 97.3 F | BODY MASS INDEX: 36.73 KG/M2 | DIASTOLIC BLOOD PRESSURE: 80 MMHG | WEIGHT: 248 LBS | HEIGHT: 69 IN | OXYGEN SATURATION: 96 % | HEART RATE: 78 BPM

## 2024-07-08 DIAGNOSIS — R19.7 DIARRHEA, UNSPECIFIED TYPE: Primary | ICD-10-CM

## 2024-07-08 PROCEDURE — 1159F MED LIST DOCD IN RCRD: CPT | Performed by: STUDENT IN AN ORGANIZED HEALTH CARE EDUCATION/TRAINING PROGRAM

## 2024-07-08 PROCEDURE — 1126F AMNT PAIN NOTED NONE PRSNT: CPT | Performed by: STUDENT IN AN ORGANIZED HEALTH CARE EDUCATION/TRAINING PROGRAM

## 2024-07-08 PROCEDURE — 3074F SYST BP LT 130 MM HG: CPT | Performed by: STUDENT IN AN ORGANIZED HEALTH CARE EDUCATION/TRAINING PROGRAM

## 2024-07-08 PROCEDURE — 99213 OFFICE O/P EST LOW 20 MIN: CPT | Performed by: STUDENT IN AN ORGANIZED HEALTH CARE EDUCATION/TRAINING PROGRAM

## 2024-07-08 PROCEDURE — G2211 COMPLEX E/M VISIT ADD ON: HCPCS | Performed by: STUDENT IN AN ORGANIZED HEALTH CARE EDUCATION/TRAINING PROGRAM

## 2024-07-08 PROCEDURE — 1160F RVW MEDS BY RX/DR IN RCRD: CPT | Performed by: STUDENT IN AN ORGANIZED HEALTH CARE EDUCATION/TRAINING PROGRAM

## 2024-07-08 PROCEDURE — 3079F DIAST BP 80-89 MM HG: CPT | Performed by: STUDENT IN AN ORGANIZED HEALTH CARE EDUCATION/TRAINING PROGRAM

## 2024-07-08 NOTE — PROGRESS NOTES
"Chief Complaint  Diarrhea    Subjective        Shady Newell presents to [facility] PRIMARY CARE  History of Present Illness    Mr. Newell presents to clinic today for diarrhea    He states 2 days ago he started to have significant watery stools and reports over 10 episodes of loose watery nonbloody stool.  He states that it has reduced in frequency however continues to have about 5 episodes a day, woke up this morning with multiple episodes.  Denies any blood in stool, nausea or vomiting, abdominal pain.  Has not taking any new medications and denies having any systemic sx. No change in diet besides BLT Saturday         Review of Systems   Constitutional:  Negative for chills and fever.   Gastrointestinal:  Positive for diarrhea. Negative for abdominal distention, abdominal pain, blood in stool, constipation, nausea and vomiting.        Objective   Vital Signs:  /80   Pulse 78   Temp 97.3 °F (36.3 °C) (Temporal)   Ht 175.3 cm (69\")   Wt 112 kg (248 lb)   SpO2 96%   BMI 36.62 kg/m²   Estimated body mass index is 36.62 kg/m² as calculated from the following:    Height as of this encounter: 175.3 cm (69\").    Weight as of this encounter: 112 kg (248 lb).               Physical Exam  Vitals reviewed.   Constitutional:       General: He is not in acute distress.     Appearance: Normal appearance. He is not toxic-appearing.   HENT:      Head: Normocephalic and atraumatic.   Eyes:      General: No scleral icterus.     Conjunctiva/sclera: Conjunctivae normal.   Skin:     General: Skin is warm and dry.   Neurological:      Mental Status: He is alert and oriented to person, place, and time.   Psychiatric:         Mood and Affect: Mood normal.         Behavior: Behavior normal.        Result Review :                   Assessment and Plan   Diagnoses and all orders for this visit:    1. Diarrhea, unspecified type (Primary)      Suspect viral gastroenteritis, no red flag sx/s and has been " somewhat improving. Recommended imdoium, gas-x prn and can try probiotic. Advised to call or RTC if sx worsen or persist by this week, will order stool studies but no signs of infectious diarrhea at this moment     RTC as previously scheduled, call or RTC if diarrhea worsens and will order stool and lab work         Follow Up   No follow-ups on file.  Patient was given instructions and counseling regarding his condition or for health maintenance advice. Please see specific information pulled into the AVS if appropriate.

## 2024-07-09 ENCOUNTER — HOSPITAL ENCOUNTER (OUTPATIENT)
Dept: CT IMAGING | Facility: HOSPITAL | Age: 84
Discharge: HOME | End: 2024-07-09
Admitting: SURGERY
Payer: MEDICARE

## 2024-07-09 DIAGNOSIS — K40.90 RIGHT INGUINAL HERNIA: ICD-10-CM

## 2024-07-09 PROCEDURE — 72192 CT PELVIS W/O DYE: CPT

## 2024-07-12 DIAGNOSIS — R19.7 DIARRHEA, UNSPECIFIED TYPE: Primary | ICD-10-CM

## 2024-07-12 DIAGNOSIS — R19.7 DIARRHEA, UNSPECIFIED TYPE: ICD-10-CM

## 2024-07-15 ENCOUNTER — DOCUMENTATION (OUTPATIENT)
Dept: SURGERY | Facility: CLINIC | Age: 84
End: 2024-07-15
Payer: MEDICARE

## 2024-07-15 DIAGNOSIS — I10 ESSENTIAL HYPERTENSION: Chronic | ICD-10-CM

## 2024-07-15 RX ORDER — CARVEDILOL 12.5 MG/1
12.5 TABLET ORAL 2 TIMES DAILY WITH MEALS
Qty: 180 TABLET | Refills: 0 | Status: SHIPPED | OUTPATIENT
Start: 2024-07-15

## 2024-07-15 NOTE — PROGRESS NOTES
I called and left a voicemail for the patient explaining that his CT shows a probable left inguinal hernia.  I think he would be a reasonable candidate for robotic left inguinal hernia repair if he wishes to proceed.  At the same time, this looks like quite a low risk defect and if it is not bothering him much, observation would also be a reasonable approach.  If he wishes to schedule, I am happy to put orders through, if he wishes to discuss this further in the office that would also be reasonable.

## 2024-07-16 LAB
C DIFF TOX GENS STL QL NAA+PROBE: NEGATIVE
SPECIMEN STATUS: NORMAL

## 2024-07-17 ENCOUNTER — OFFICE VISIT (OUTPATIENT)
Dept: CARDIOLOGY | Facility: CLINIC | Age: 84
End: 2024-07-17
Payer: MEDICARE

## 2024-07-17 VITALS
HEART RATE: 67 BPM | DIASTOLIC BLOOD PRESSURE: 70 MMHG | SYSTOLIC BLOOD PRESSURE: 138 MMHG | HEIGHT: 69 IN | WEIGHT: 247 LBS | BODY MASS INDEX: 36.58 KG/M2

## 2024-07-17 DIAGNOSIS — R68.89 ACTIVITY INTOLERANCE: ICD-10-CM

## 2024-07-17 DIAGNOSIS — I25.10 CORONARY ARTERY DISEASE INVOLVING NATIVE CORONARY ARTERY OF NATIVE HEART WITHOUT ANGINA PECTORIS: Primary | ICD-10-CM

## 2024-07-17 DIAGNOSIS — R06.09 DOE (DYSPNEA ON EXERTION): ICD-10-CM

## 2024-07-17 DIAGNOSIS — E78.5 DYSLIPIDEMIA: ICD-10-CM

## 2024-07-17 PROCEDURE — 3075F SYST BP GE 130 - 139MM HG: CPT | Performed by: NURSE PRACTITIONER

## 2024-07-17 PROCEDURE — 1160F RVW MEDS BY RX/DR IN RCRD: CPT | Performed by: NURSE PRACTITIONER

## 2024-07-17 PROCEDURE — 99214 OFFICE O/P EST MOD 30 MIN: CPT | Performed by: NURSE PRACTITIONER

## 2024-07-17 PROCEDURE — 93000 ELECTROCARDIOGRAM COMPLETE: CPT | Performed by: NURSE PRACTITIONER

## 2024-07-17 PROCEDURE — 3078F DIAST BP <80 MM HG: CPT | Performed by: NURSE PRACTITIONER

## 2024-07-17 PROCEDURE — 1159F MED LIST DOCD IN RCRD: CPT | Performed by: NURSE PRACTITIONER

## 2024-07-17 NOTE — PROGRESS NOTES
Date of Office Visit: 24  Encounter Provider: SHUN Good  Place of Service: [facility] CARDIOLOGY  Patient Name: Shady Newell  :1940    Chief Complaint   Patient presents with    Coronary artery disease involving native coronary artery of    Follow-up   :     HPI: Shady Newell is a 84 y.o. male  with hypertension, hyperlipidemia, diabetes mellitus, coronary artery disease, GERD, BPH, aortic valve stenosis.  He is a former smoker.    He was previously followed by [facility] cardiology.  He is now followed by Dr. Naot Michaud.  I will visit with him for the first time and have reviewed his medical record.    In  he had left heart catheterization with PCI of the LAD.      In  he had PFTs within normal limit.  Otherwise nonobstructive disease with mild flow limitation of jailed diagonal.  In 2017 he had repeat left heart catheterization with significant mid to distal RCA lesion treated with 2 overlapping drug-eluting stents.  His ejection fraction was 60% at that time.    In May 2022 he had COVID exposure.    Last echo 2023 showed preserved LV systolic function, mild to moderate concentric hypertrophy, mild to moderate aortic valve stenoses and normal RVSP.    He presents today for reassessment.  He has noticed that his legs get tired with his normal treadmill exercise.  He was able to go 15 minutes but his legs started to get tired 1 or 1-1/2-month ago.  So now he started back at 7 minutes and is up to 8-9 minutes.  He was able to increase duration on the NuStep.  He [place] infrequently at the mall with his wife.  He denies chest pain or shortness of breath or edema or dizziness or palpitation.  He has activity intolerance and has been more fatigued.  He has an inguinal hernia and had a CT scan.  He does not require operation at this time.    He had some issues recently with loose stool/diarrhea.  Allergies   Allergen  "Reactions    Other      Dual antiplatelet therapy, spontaneous bruising as of June 2010           Family and social history reviewed.     ROS  All other systems were reviewed and are negative          Objective:     Vitals:    07/17/24 0817   BP: 138/70   BP Location: Left arm   Patient Position: Sitting   Pulse: 67   Weight: 112 kg (247 lb)   Height: 175.3 cm (69\")     Body mass index is 36.48 kg/m².    PHYSICAL EXAM:  Pulmonary:      Effort: Pulmonary effort is normal.      Breath sounds: Normal breath sounds.   Cardiovascular:      Normal rate. Regular rhythm.      Murmurs: There is a grade 1/6 systolic murmur at the URSB.           ECG 12 Lead    Date/Time: 7/17/2024 8:35 AM  Performed by: Mellisa Leonard APRN    Authorized by: Mellisa Leonard APRN  Comparison: compared with previous ECG   Similar to previous ECG  Rhythm: sinus rhythm  Rate: normal  Conduction: 1st degree AV block  QRS axis: normal  Comments: No significant change            Current Outpatient Medications   Medication Sig Dispense Refill    aspirin 81 MG EC tablet Take 1 tablet by mouth Daily. 1 tablet 0    atorvastatin (LIPITOR) 10 MG tablet TAKE 1 TABLET BY MOUTH EVERY DAY AT NIGHT 90 tablet 3    carvedilol (Coreg) 12.5 MG tablet Take 1 tablet by mouth 2 (Two) Times a Day With Meals. 180 tablet 0    famotidine (PEPCID) 20 MG tablet TAKE 1 TABLET BY MOUTH EVERYDAY AT BEDTIME 90 tablet 3    finasteride (PROSCAR) 5 MG tablet Take 1 tablet by mouth Daily. 90 tablet 1    glimepiride (AMARYL) 2 MG tablet TAKE 1 TABLET BY MOUTH EVERY DAY BEFORE BREAKFAST 90 tablet 1    hydroCHLOROthiazide 25 MG tablet TAKE 1 TABLET BY MOUTH EVERY DAY 90 tablet 3    loratadine (CLARITIN) 10 MG tablet TAKE 1 TABLET BY MOUTH EVERY DAY AS NEEDED FOR ALLERGIES 90 tablet 1    Multiple Vitamins-Minerals (MULTIVITAMIN ADULT PO) Take 1 tablet by mouth Daily.      nitroglycerin (NITROSTAT) 0.4 MG SL tablet Place 1 tablet under the tongue Every 5 (Five) Minutes As Needed for " Chest Pain. Take no more than 3 doses in 15 minutes. 30 tablet 1    polyethyl glycol-propyl glycol (SYSTANE) 0.4-0.3 % solution ophthalmic solution (artificial tears) Administer  to both eyes Daily As Needed.      ramipril (Altace) 10 MG capsule Take 1 capsule by mouth Daily.      tamsulosin (FLOMAX) 0.4 MG capsule 24 hr capsule Take 1 capsule by mouth Every Night. 90 capsule 1    tolterodine LA (DETROL LA) 4 MG 24 hr capsule Take 1 capsule by mouth Daily.      fluorouracil (EFUDEX) 5 % cream Apply 1 Application topically to the appropriate area as directed 2 (Two) Times a Day.      glucose blood test strip 1 each by Other route Daily As Needed (Blood sugar monitoring). Use as instructed 100 each 12    glucose monitor monitoring kit 1 each Daily As Needed (Blood sugar monitoring). 1 each 0     No current facility-administered medications for this visit.     Assessment:       Diagnosis Plan   1. Coronary artery disease involving native coronary artery of native heart without angina pectoris  Stress Test With Myocardial Perfusion One Day      2. BROUSSARD (dyspnea on exertion)  Stress Test With Myocardial Perfusion One Day      3. Activity intolerance  Stress Test With Myocardial Perfusion One Day           Orders Placed This Encounter   Procedures    Stress Test With Myocardial Perfusion One Day     Standing Status:   Future     Standing Expiration Date:   7/17/2025     Order Specific Question:   What stress agent will be used?     Answer:   Regadenoson (Lexiscan)     Order Specific Question:   Difficulty walking criteria?     Answer:   Musculoskeletal (hips, knees, feet, back, amputee)     Order Specific Question:   Difficulty walking criteria?     Answer:   Poor Exercise Tolerance     Order Specific Question:   Reason for exam?     Answer:   Known Coronary Artery Disease     Order Specific Question:   Reason for exam?     Answer:   Other Reasons (uncertain in most circumstances)     Order Specific Question:   Known CAD  specification?     Answer:   Ischemic Equivalent     Order Specific Question:   Other Reasons (uncertain in most circumstances)?     Answer:   Valvular Disease     Order Specific Question:   Release to patient     Answer:   Routine Release [[id number]]    ECG 12 Lead     This order was created via procedure documentation     Order Specific Question:   Release to patient     Answer:   Routine Release [[id number]]         Plan:       1.  84-year-old gentleman with coronary artery disease, PCI of the LAD in 2006 with overlapping drug-eluting stents to the mid to distal right coronary artery in 08/2017.  Negative perfusion stress test June 2021.  -I am concerned with his activity intolerance, legs getting tired with routine exercise and history of coronary artery disease.  He has not had an ischemic evaluation in 3 years.  I have recommended perfusion stress test, not on walking protocol for further evaluation.  If that is negative, we may consider PAD testing with WILSON bilateral.  We will also need to keep in mind his mild to moderate aortic valve stenoses and consider repeat echo next year for that.  2.  Hypertension-blood pressure per stable  3.  Mixed dyslipidemia he is on atorvastatin 10 mg.  Target LDL less than 70  4.  Diabetes mellitus.  Adequate control with hemoglobin A1c 7.10 in March  5.  Mild to moderate aortic valve stenoses on echo December 2023-consider repeat echocardiogram in approximately 6 months pending results of perfusion stress test in the above  6.  BPH  7.  History of PVCs and PACs  8.  GERD  9. Inguinal hernia-no operation has been recommended at this time by Dr. Dominguez  10.  Recent diarrhea    Further recommendation to be made pending stress test results          It has been a pleasure to participate in this patient's care.      Thank you,  SHUN Good      **I used Dragon to dictate this note:**

## 2024-07-23 ENCOUNTER — TELEPHONE (OUTPATIENT)
Dept: CARDIOLOGY | Facility: CLINIC | Age: 84
End: 2024-07-23
Payer: MEDICARE

## 2024-07-23 LAB
BACTERIA SPEC CULT: NORMAL
BACTERIA SPEC CULT: NORMAL
CAMPYLOBACTER STL CULT: NORMAL
E COLI SXT STL QL IA: NEGATIVE
O+P SPEC MICRO: NORMAL
O+P STL CONC: NORMAL
SALM + SHIG STL CULT: NORMAL
SPECIMEN STATUS: NORMAL
WBC STL QL MICRO: NORMAL
WBC STL QL MICRO: NORMAL

## 2024-07-24 ENCOUNTER — HOSPITAL ENCOUNTER (OUTPATIENT)
Dept: CARDIOLOGY | Facility: HOSPITAL | Age: 84
Discharge: HOME | End: 2024-07-24
Payer: MEDICARE

## 2024-07-24 DIAGNOSIS — R68.89 ACTIVITY INTOLERANCE: ICD-10-CM

## 2024-07-24 DIAGNOSIS — I25.10 CORONARY ARTERY DISEASE INVOLVING NATIVE CORONARY ARTERY OF NATIVE HEART WITHOUT ANGINA PECTORIS: ICD-10-CM

## 2024-07-24 DIAGNOSIS — R06.09 DOE (DYSPNEA ON EXERTION): ICD-10-CM

## 2024-07-24 LAB
BH CV NUCLEAR PRIOR STUDY: 1
BH CV REST NUCLEAR ISOTOPE DOSE: 11 MCI
BH CV STRESS BP STAGE 1: NORMAL
BH CV STRESS COMMENTS STAGE 1: NORMAL
BH CV STRESS DOSE REGADENOSON STAGE 1: 0.4
BH CV STRESS DURATION MIN STAGE 1: 0
BH CV STRESS DURATION SEC STAGE 1: 10
BH CV STRESS HR STAGE 1: 100
BH CV STRESS NUCLEAR ISOTOPE DOSE: 34.3 MCI
BH CV STRESS PROTOCOL 1: NORMAL
BH CV STRESS RECOVERY BP: NORMAL MMHG
BH CV STRESS RECOVERY HR: 93 BPM
BH CV STRESS STAGE 1: 1
LV EF NUC BP: 53 %
MAXIMAL PREDICTED HEART RATE: 136 BPM
PERCENT MAX PREDICTED HR: 73.53 %
STRESS BASELINE BP: NORMAL MMHG
STRESS BASELINE HR: 71 BPM
STRESS PERCENT HR: 87 %
STRESS POST EXERCISE DUR SEC: 10 SEC
STRESS POST PEAK BP: NORMAL MMHG
STRESS POST PEAK HR: 100 BPM
STRESS TARGET HR: 116 BPM

## 2024-07-24 PROCEDURE — 78452 HT MUSCLE IMAGE SPECT MULT: CPT

## 2024-07-24 PROCEDURE — A9502 TC99M TETROFOSMIN: HCPCS | Performed by: NURSE PRACTITIONER

## 2024-07-24 PROCEDURE — 25010000002 REGADENOSON 0.4 MG/5ML SOLUTION: Performed by: NURSE PRACTITIONER

## 2024-07-24 PROCEDURE — 93017 CV STRESS TEST TRACING ONLY: CPT

## 2024-07-24 PROCEDURE — 0 TECHNETIUM TETROFOSMIN KIT: Performed by: NURSE PRACTITIONER

## 2024-07-24 RX ORDER — REGADENOSON 0.08 MG/ML
0.4 INJECTION, SOLUTION INTRAVENOUS
Status: COMPLETED | OUTPATIENT
Start: 2024-07-24 | End: 2024-07-24

## 2024-07-24 RX ADMIN — TETROFOSMIN 1 DOSE: 1.38 INJECTION, POWDER, LYOPHILIZED, FOR SOLUTION INTRAVENOUS at 09:35

## 2024-07-24 RX ADMIN — REGADENOSON 0.4 MG: 0.08 INJECTION, SOLUTION INTRAVENOUS at 10:31

## 2024-07-24 RX ADMIN — TETROFOSMIN 1 DOSE: 1.38 INJECTION, POWDER, LYOPHILIZED, FOR SOLUTION INTRAVENOUS at 10:31

## 2024-07-25 ENCOUNTER — TELEPHONE (OUTPATIENT)
Dept: CARDIOLOGY | Facility: CLINIC | Age: 84
End: 2024-07-25
Payer: MEDICARE

## 2024-07-25 DIAGNOSIS — I35.0 NONRHEUMATIC AORTIC (VALVE) STENOSIS: ICD-10-CM

## 2024-07-25 DIAGNOSIS — E78.2 MIXED HYPERLIPIDEMIA: ICD-10-CM

## 2024-07-25 DIAGNOSIS — I25.10 CORONARY ARTERY DISEASE INVOLVING NATIVE CORONARY ARTERY OF NATIVE HEART WITHOUT ANGINA PECTORIS: ICD-10-CM

## 2024-07-25 DIAGNOSIS — E11.9 CONTROLLED TYPE 2 DIABETES MELLITUS WITHOUT COMPLICATION, WITHOUT LONG-TERM CURRENT USE OF INSULIN: Chronic | ICD-10-CM

## 2024-07-25 DIAGNOSIS — R68.89 ACTIVITY INTOLERANCE: Primary | ICD-10-CM

## 2024-07-25 DIAGNOSIS — M62.89 EXERCISE-INDUCED LEG FATIGUE: ICD-10-CM

## 2024-07-25 DIAGNOSIS — I73.9 INTERMITTENT CLAUDICATION: ICD-10-CM

## 2024-07-25 NOTE — TELEPHONE ENCOUNTER
Called and left a VM. Will continue to try to reach pt.    Fozia Garcia, RN  Triage [facility] [place]  07/25/24 10:34 EDT

## 2024-07-25 NOTE — TELEPHONE ENCOUNTER
Shady Newell returned call.    Reviewed results and recommendations with patient and he verbalized understanding of results and recommendations.  Provided patient phone number for schedule one ([phone]) and he stated he will call to schedule.    Thank you,  Izzy ALATORRE RN  Triage Nurse [facility] [place]   10:49 EDT

## 2024-07-25 NOTE — PROGRESS NOTES
I am covering Mellisa's in basket today.    He recently reported activity intolerance, fatigue, and leg fatigue.  No chest pain or shortness of breath.  History of CAD.  Please call patient and let him know that stress testing is normal.  Mellisa recommended WILSON testing on his legs I placed the order.  Thank you

## 2024-07-25 NOTE — TELEPHONE ENCOUNTER
----- Message from Elsi Garcia sent at 7/25/2024  9:12 AM EDT -----  I am covering Mellisa's in basket today.    He recently reported activity intolerance, fatigue, and leg fatigue.  No chest pain or shortness of breath.  History of CAD.  Please call patient and let him know that stress testing is normal.  Mellisa recommended WILSON testing on his legs I placed the order.  Thank you

## 2024-08-08 DIAGNOSIS — E11.9 CONTROLLED TYPE 2 DIABETES MELLITUS WITHOUT COMPLICATION, WITHOUT LONG-TERM CURRENT USE OF INSULIN: ICD-10-CM

## 2024-08-08 RX ORDER — GLIMEPIRIDE 2 MG/1
2 TABLET ORAL
Qty: 90 TABLET | Refills: 1 | OUTPATIENT
Start: 2024-08-08

## 2024-08-08 NOTE — TELEPHONE ENCOUNTER
Rx Refill Note  Requested Prescriptions     Pending Prescriptions Disp Refills    glimepiride (AMARYL) 2 MG tablet [Pharmacy Med Name: GLIMEPIRIDE 2 MG TABLET] 90 tablet 1     Sig: TAKE 1 TABLET BY MOUTH EVERY DAY BEFORE BREAKFAST      Last office visit with prescribing clinician: 12/20/2023   Last telemedicine visit with prescribing clinician: Visit date not found   Next office visit with prescribing clinician: Visit date not found                         Would you like a call back once the refill request has been completed: [] Yes [] No    If the office needs to give you a call back, can they leave a voicemail: [] Yes [] No    Izzy Blanchard MA  08/08/24, 09:49 EDT

## 2024-08-12 ENCOUNTER — HOSPITAL ENCOUNTER (OUTPATIENT)
Dept: CARDIOLOGY | Facility: HOSPITAL | Age: 84
Discharge: HOME | End: 2024-08-12
Admitting: NURSE PRACTITIONER
Payer: MEDICARE

## 2024-08-12 DIAGNOSIS — E78.2 MIXED HYPERLIPIDEMIA: ICD-10-CM

## 2024-08-12 DIAGNOSIS — I25.10 CORONARY ARTERY DISEASE INVOLVING NATIVE CORONARY ARTERY OF NATIVE HEART WITHOUT ANGINA PECTORIS: ICD-10-CM

## 2024-08-12 DIAGNOSIS — I73.9 INTERMITTENT CLAUDICATION: ICD-10-CM

## 2024-08-12 DIAGNOSIS — M62.89 EXERCISE-INDUCED LEG FATIGUE: ICD-10-CM

## 2024-08-12 DIAGNOSIS — R68.89 ACTIVITY INTOLERANCE: ICD-10-CM

## 2024-08-12 DIAGNOSIS — E11.9 CONTROLLED TYPE 2 DIABETES MELLITUS WITHOUT COMPLICATION, WITHOUT LONG-TERM CURRENT USE OF INSULIN: Chronic | ICD-10-CM

## 2024-08-12 LAB
BH CV LOWER ARTERIAL LEFT ABI RATIO: 1.01
BH CV LOWER ARTERIAL LEFT DORSALIS PEDIS SYS MAX: 132
BH CV LOWER ARTERIAL LEFT GREAT TOE SYS MAX: 124
BH CV LOWER ARTERIAL LEFT POST TIBIAL SYS MAX: 146
BH CV LOWER ARTERIAL LEFT TBI RATIO: 0.86
BH CV LOWER ARTERIAL RIGHT ABI RATIO: 0.99
BH CV LOWER ARTERIAL RIGHT DORSALIS PEDIS SYS MAX: 133
BH CV LOWER ARTERIAL RIGHT GREAT TOE SYS MAX: 123
BH CV LOWER ARTERIAL RIGHT POST TIBIAL SYS MAX: 143
BH CV LOWER ARTERIAL RIGHT TBI RATIO: 0.85
UPPER ARTERIAL LEFT ARM BRACHIAL SYS MAX: 138
UPPER ARTERIAL RIGHT ARM BRACHIAL SYS MAX: 145

## 2024-08-12 PROCEDURE — 93922 UPR/L XTREMITY ART 2 LEVELS: CPT

## 2024-08-12 PROCEDURE — 93922 UPR/L XTREMITY ART 2 LEVELS: CPT | Performed by: SURGERY

## 2024-08-12 NOTE — PROGRESS NOTES
Please call patient.  ABIs normal.  Please follow-up with PCP about leg fatigue.    KATERINA Pak-I ordered this for you when you are out.  Thanks

## 2024-08-13 DIAGNOSIS — E11.9 CONTROLLED TYPE 2 DIABETES MELLITUS WITHOUT COMPLICATION, WITHOUT LONG-TERM CURRENT USE OF INSULIN: ICD-10-CM

## 2024-08-13 DIAGNOSIS — K21.9 GASTROESOPHAGEAL REFLUX DISEASE WITHOUT ESOPHAGITIS: ICD-10-CM

## 2024-08-13 DIAGNOSIS — I10 ESSENTIAL HYPERTENSION: Chronic | ICD-10-CM

## 2024-08-13 DIAGNOSIS — I25.10 CORONARY ARTERY DISEASE INVOLVING NATIVE CORONARY ARTERY OF NATIVE HEART WITHOUT ANGINA PECTORIS: Chronic | ICD-10-CM

## 2024-08-13 RX ORDER — HYDROCHLOROTHIAZIDE 25 MG/1
25 TABLET ORAL DAILY
Qty: 90 TABLET | Refills: 1 | Status: SHIPPED | OUTPATIENT
Start: 2024-08-13

## 2024-08-13 RX ORDER — NITROGLYCERIN 0.4 MG/1
0.4 TABLET SUBLINGUAL
Qty: 30 TABLET | Refills: 1 | Status: SHIPPED | OUTPATIENT
Start: 2024-08-13

## 2024-08-13 RX ORDER — FAMOTIDINE 20 MG/1
20 TABLET, FILM COATED ORAL
Qty: 90 TABLET | Refills: 1 | Status: SHIPPED | OUTPATIENT
Start: 2024-08-13

## 2024-08-13 RX ORDER — GLIMEPIRIDE 2 MG/1
2 TABLET ORAL
Qty: 90 TABLET | Refills: 1 | Status: SHIPPED | OUTPATIENT
Start: 2024-08-13

## 2024-08-13 RX ORDER — RAMIPRIL 10 MG/1
10 CAPSULE ORAL DAILY
Qty: 90 CAPSULE | Refills: 1 | Status: SHIPPED | OUTPATIENT
Start: 2024-08-13

## 2024-08-28 DIAGNOSIS — E78.5 DYSLIPIDEMIA: ICD-10-CM

## 2024-08-28 DIAGNOSIS — I10 ESSENTIAL HYPERTENSION: Chronic | ICD-10-CM

## 2024-08-28 RX ORDER — RAMIPRIL 10 MG/1
10 CAPSULE ORAL 2 TIMES DAILY
Qty: 180 CAPSULE | Refills: 1 | Status: SHIPPED | OUTPATIENT
Start: 2024-08-28

## 2024-08-28 RX ORDER — ATORVASTATIN CALCIUM 10 MG/1
TABLET, FILM COATED ORAL
Qty: 90 TABLET | Refills: 3 | Status: SHIPPED | OUTPATIENT
Start: 2024-08-28

## 2024-09-04 ENCOUNTER — LAB (OUTPATIENT)
Dept: LAB | Facility: HOSPITAL | Age: 84
End: 2024-09-04
Payer: MEDICARE

## 2024-09-04 ENCOUNTER — TELEPHONE (OUTPATIENT)
Dept: INTERNAL MEDICINE | Facility: CLINIC | Age: 84
End: 2024-09-04
Payer: MEDICARE

## 2024-09-04 DIAGNOSIS — E11.9 CONTROLLED TYPE 2 DIABETES MELLITUS WITHOUT COMPLICATION, WITHOUT LONG-TERM CURRENT USE OF INSULIN: ICD-10-CM

## 2024-09-04 DIAGNOSIS — I10 ESSENTIAL HYPERTENSION: ICD-10-CM

## 2024-09-04 LAB
ALBUMIN SERPL-MCNC: 4.2 G/DL (ref 3.5–5.2)
ALBUMIN/GLOB SERPL: 1.8 G/DL
ALP SERPL-CCNC: 95 U/L (ref 39–117)
ALT SERPL W P-5'-P-CCNC: 44 U/L (ref 1–41)
ANION GAP SERPL CALCULATED.3IONS-SCNC: 9.8 MMOL/L (ref 5–15)
AST SERPL-CCNC: 33 U/L (ref 1–40)
BILIRUB SERPL-MCNC: 0.8 MG/DL (ref 0–1.2)
BUN SERPL-MCNC: 12 MG/DL (ref 8–23)
BUN/CREAT SERPL: 13.6 (ref 7–25)
CALCIUM SPEC-SCNC: 9.6 MG/DL (ref 8.6–10.5)
CHLORIDE SERPL-SCNC: 101 MMOL/L (ref 98–107)
CO2 SERPL-SCNC: 26.2 MMOL/L (ref 22–29)
CREAT SERPL-MCNC: 0.88 MG/DL (ref 0.76–1.27)
EGFRCR SERPLBLD CKD-EPI 2021: 84.8 ML/MIN/1.73
GLOBULIN UR ELPH-MCNC: 2.3 GM/DL
GLUCOSE SERPL-MCNC: 177 MG/DL (ref 65–99)
HBA1C MFR BLD: 7.2 % (ref 4.8–5.6)
POTASSIUM SERPL-SCNC: 4.6 MMOL/L (ref 3.5–5.2)
PROT SERPL-MCNC: 6.5 G/DL (ref 6–8.5)
SODIUM SERPL-SCNC: 137 MMOL/L (ref 136–145)

## 2024-09-04 PROCEDURE — 80053 COMPREHEN METABOLIC PANEL: CPT

## 2024-09-04 PROCEDURE — 36415 COLL VENOUS BLD VENIPUNCTURE: CPT

## 2024-09-04 PROCEDURE — 83036 HEMOGLOBIN GLYCOSYLATED A1C: CPT

## 2024-09-04 NOTE — TELEPHONE ENCOUNTER
PATIENT CALLED BACK AND STATES HE HAD HIS FECAL LABS DONE ON 7/15/24   A MESSAGE WAS SENT FROM  PATIENT ON 7/20/24 TO ADVISE ABOUT RESULTS     HE WAS ADVISED BY DR. MUNSON ON 7/22/24 ABOUT THE LAB RESULTS

## 2024-09-20 ENCOUNTER — OFFICE VISIT (OUTPATIENT)
Dept: INTERNAL MEDICINE | Facility: CLINIC | Age: 84
End: 2024-09-20
Payer: MEDICARE

## 2024-09-20 VITALS
DIASTOLIC BLOOD PRESSURE: 74 MMHG | BODY MASS INDEX: 36.82 KG/M2 | WEIGHT: 248.6 LBS | SYSTOLIC BLOOD PRESSURE: 138 MMHG | TEMPERATURE: 97.1 F | HEIGHT: 69 IN

## 2024-09-20 DIAGNOSIS — R19.5 LOOSE STOOLS: Primary | ICD-10-CM

## 2024-09-20 DIAGNOSIS — R19.7 DIARRHEA, UNSPECIFIED TYPE: ICD-10-CM

## 2024-09-20 DIAGNOSIS — R53.1 WEAKNESS: ICD-10-CM

## 2024-09-20 PROCEDURE — 3078F DIAST BP <80 MM HG: CPT | Performed by: PHYSICIAN ASSISTANT

## 2024-09-20 PROCEDURE — 1126F AMNT PAIN NOTED NONE PRSNT: CPT | Performed by: PHYSICIAN ASSISTANT

## 2024-09-20 PROCEDURE — 99213 OFFICE O/P EST LOW 20 MIN: CPT | Performed by: PHYSICIAN ASSISTANT

## 2024-09-20 PROCEDURE — 3075F SYST BP GE 130 - 139MM HG: CPT | Performed by: PHYSICIAN ASSISTANT

## 2024-09-21 LAB
HAV IGM SERPL QL IA: NEGATIVE
HBV CORE IGM SERPL QL IA: NEGATIVE
HBV SURFACE AG SERPL QL IA: NEGATIVE
HCV AB SERPL QL IA: NORMAL
HCV IGG SERPL QL IA: NON REACTIVE

## 2024-09-30 ENCOUNTER — OFFICE VISIT (OUTPATIENT)
Dept: INTERNAL MEDICINE | Facility: CLINIC | Age: 84
End: 2024-09-30
Payer: MEDICARE

## 2024-09-30 VITALS
OXYGEN SATURATION: 94 % | HEART RATE: 75 BPM | BODY MASS INDEX: 36.26 KG/M2 | HEIGHT: 69 IN | SYSTOLIC BLOOD PRESSURE: 124 MMHG | TEMPERATURE: 96.4 F | DIASTOLIC BLOOD PRESSURE: 72 MMHG | WEIGHT: 244.8 LBS

## 2024-09-30 DIAGNOSIS — I10 ESSENTIAL HYPERTENSION: ICD-10-CM

## 2024-09-30 DIAGNOSIS — R19.5 LOOSE STOOLS: ICD-10-CM

## 2024-09-30 DIAGNOSIS — E11.9 CONTROLLED TYPE 2 DIABETES MELLITUS WITHOUT COMPLICATION, WITHOUT LONG-TERM CURRENT USE OF INSULIN: Primary | ICD-10-CM

## 2024-09-30 PROCEDURE — 99214 OFFICE O/P EST MOD 30 MIN: CPT | Performed by: STUDENT IN AN ORGANIZED HEALTH CARE EDUCATION/TRAINING PROGRAM

## 2024-09-30 PROCEDURE — 3074F SYST BP LT 130 MM HG: CPT | Performed by: STUDENT IN AN ORGANIZED HEALTH CARE EDUCATION/TRAINING PROGRAM

## 2024-09-30 PROCEDURE — 3078F DIAST BP <80 MM HG: CPT | Performed by: STUDENT IN AN ORGANIZED HEALTH CARE EDUCATION/TRAINING PROGRAM

## 2024-09-30 PROCEDURE — G2211 COMPLEX E/M VISIT ADD ON: HCPCS | Performed by: STUDENT IN AN ORGANIZED HEALTH CARE EDUCATION/TRAINING PROGRAM

## 2024-09-30 PROCEDURE — 1126F AMNT PAIN NOTED NONE PRSNT: CPT | Performed by: STUDENT IN AN ORGANIZED HEALTH CARE EDUCATION/TRAINING PROGRAM

## 2024-09-30 RX ORDER — SENNOSIDES 8.6 MG
CAPSULE ORAL
COMMUNITY
Start: 2024-08-15

## 2024-09-30 NOTE — PROGRESS NOTES
"Chief Complaint  Diarrhea    Subjective        Shady Newell presents to [facility] PRIMARY CARE  History of Present Illness    Presents to clinic today for follow-up of chronic medical conditions as well as persistent diarrhea.    He was seen in our office about 2 weeks ago with worsening and increased bowel movements that were loose and consistent with diarrhea.  His wife had a similar episode, workup demonstrated no signs of bacterial etiology or hepatitis.  He states that since being seen he has had improvement. Does state imodium does help, no unintentional weight loss or blood noted     Objective   Vital Signs:  /72   Pulse 75   Temp 96.4 °F (35.8 °C) (Temporal)   Ht 175.3 cm (69.02\")   Wt 111 kg (244 lb 12.8 oz)   SpO2 94%   BMI 36.13 kg/m²   Estimated body mass index is 36.13 kg/m² as calculated from the following:    Height as of this encounter: 175.3 cm (69.02\").    Weight as of this encounter: 111 kg (244 lb 12.8 oz).            Physical Exam  Vitals reviewed.   Constitutional:       General: He is not in acute distress.     Appearance: Normal appearance. He is not toxic-appearing.   HENT:      Head: Normocephalic and atraumatic.   Eyes:      General: No scleral icterus.     Conjunctiva/sclera: Conjunctivae normal.   Skin:     General: Skin is warm and dry.   Neurological:      Mental Status: He is alert and oriented to person, place, and time.   Psychiatric:         Mood and Affect: Mood normal.         Behavior: Behavior normal.        Result Review :                Assessment and Plan   Diagnoses and all orders for this visit:    1. Controlled type 2 diabetes mellitus without complication, without long-term current use of insulin (Primary)    2. Essential hypertension    3. Loose stools      A1c slightly higher since last visit, but goal for him will be <8% which he is at. Continue glimepiride    BP is at goal today, continue current altace, coreg and hctz. "     Suspect his loose stools is some component of IBS-D, discussed dietary modifications and can use Imodium as needed.  I did state that if things were to worsen we could consider more thorough evaluations such as colonoscopy to evaluate for microscopic colitis, etc     RTC in about 9 weeks for AWV or sooner prn          Follow Up   Return in about 9 weeks (around 12/2/2024) for Medicare Wellness.  Patient was given instructions and counseling regarding his condition or for health maintenance advice. Please see specific information pulled into the AVS if appropriate.

## 2024-10-06 DIAGNOSIS — I10 ESSENTIAL HYPERTENSION: Chronic | ICD-10-CM

## 2024-10-07 RX ORDER — CARVEDILOL 12.5 MG/1
12.5 TABLET ORAL 2 TIMES DAILY WITH MEALS
Qty: 180 TABLET | Refills: 0 | Status: SHIPPED | OUTPATIENT
Start: 2024-10-07

## 2024-10-13 DIAGNOSIS — K21.9 GASTROESOPHAGEAL REFLUX DISEASE WITHOUT ESOPHAGITIS: ICD-10-CM

## 2024-10-14 RX ORDER — FAMOTIDINE 20 MG/1
20 TABLET, FILM COATED ORAL
Qty: 90 TABLET | Refills: 1 | Status: SHIPPED | OUTPATIENT
Start: 2024-10-14

## 2024-12-13 ENCOUNTER — OFFICE VISIT (OUTPATIENT)
Dept: INTERNAL MEDICINE | Facility: CLINIC | Age: 84
End: 2024-12-13
Payer: MEDICARE

## 2024-12-13 DIAGNOSIS — I10 ESSENTIAL HYPERTENSION: ICD-10-CM

## 2024-12-13 DIAGNOSIS — E78.2 MIXED HYPERLIPIDEMIA: Chronic | ICD-10-CM

## 2024-12-13 DIAGNOSIS — Z00.00 MEDICARE ANNUAL WELLNESS VISIT, SUBSEQUENT: Primary | ICD-10-CM

## 2024-12-13 DIAGNOSIS — M79.18 MYALGIA, LOWER LEG: ICD-10-CM

## 2024-12-13 DIAGNOSIS — E11.9 CONTROLLED TYPE 2 DIABETES MELLITUS WITHOUT COMPLICATION, WITHOUT LONG-TERM CURRENT USE OF INSULIN: ICD-10-CM

## 2024-12-13 NOTE — PROGRESS NOTES
Subjective   The ABCs of the Annual Wellness Visit  Medicare Wellness Visit      Shady Newell is a 84 y.o. patient who presents for a Medicare Wellness Visit.    The following portions of the patient's history were reviewed and   updated as appropriate: allergies, current medications, past family history, past medical history, past social history, past surgical history, and problem list.    Compared to one year ago, the patient's physical   health is the same.  Compared to one year ago, the patient's mental   health is the same.    Recent Hospitalizations:  This patient has had a [facility] admission record on file within the last 365 days.  Current Medical Providers:  Patient Care Team:  Rusty Izaguirre MD as PCP - General (Internal Medicine)    Outpatient Medications Prior to Visit   Medication Sig Dispense Refill    acetaminophen (TYLENOL) 650 MG 8 hr tablet       aspirin 81 MG EC tablet Take 1 tablet by mouth Daily. 1 tablet 0    atorvastatin (LIPITOR) 10 MG tablet TAKE 1 TABLET BY MOUTH EVERY DAY AT NIGHT 90 tablet 3    carvedilol (Coreg) 12.5 MG tablet Take 1 tablet by mouth 2 (Two) Times a Day With Meals. 180 tablet 0    famotidine (PEPCID) 20 MG tablet Take 1 tablet by mouth every night at bedtime. 90 tablet 1    finasteride (PROSCAR) 5 MG tablet Take 1 tablet by mouth Daily. 90 tablet 1    glimepiride (AMARYL) 2 MG tablet Take 1 tablet by mouth Every Morning Before Breakfast. 90 tablet 1    hydroCHLOROthiazide 25 MG tablet Take 1 tablet by mouth Daily. 90 tablet 1    loratadine (CLARITIN) 10 MG tablet TAKE 1 TABLET BY MOUTH EVERY DAY AS NEEDED FOR ALLERGIES 90 tablet 1    Multiple Vitamins-Minerals (MULTIVITAMIN ADULT PO) Take 1 tablet by mouth Daily.      Multiple Vitamins-Minerals (PRESERVISION AREDS 2+MULTI VIT PO)       nitroglycerin (NITROSTAT) 0.4 MG SL tablet Place 1 tablet under the tongue Every 5 (Five) Minutes As Needed for Chest Pain. Take no more than 3 doses in 15 minutes. 30  tablet 1    polyethyl glycol-propyl glycol (SYSTANE) 0.4-0.3 % solution ophthalmic solution (artificial tears) Administer  to both eyes Daily As Needed.      ramipril (ALTACE) 10 MG capsule TAKE 1 CAPSULE BY MOUTH TWICE A  capsule 1    tamsulosin (FLOMAX) 0.4 MG capsule 24 hr capsule Take 1 capsule by mouth Every Night. 90 capsule 1     No facility-administered medications prior to visit.     No opioid medication identified on active medication list. I have reviewed chart for other potential  high risk medication/s and harmful drug interactions in the elderly.      Aspirin is on active medication list. Aspirin use is indicated based on review of current medical condition/s. Pros and cons of this therapy have been discussed today. Benefits of this medication outweigh potential harm.  Patient has been encouraged to continue taking this medication.  .      Patient Active Problem List   Diagnosis    Coronary artery disease involving native coronary artery of native heart without angina pectoris    Mixed hyperlipidemia    Essential hypertension    Severe obesity with body mass index (BMI) of 35.0 to 39.9 with serious comorbidity    Primary osteoarthritis involving multiple joints    Benign prostatic hyperplasia with weak urinary stream    Gastroesophageal reflux disease without esophagitis    Controlled type 2 diabetes mellitus without complication, without long-term current use of insulin    Seasonal allergies    Systolic murmur    Full thickness rotator cuff tear    Elevated transaminase level    Glaucoma suspect of both eyes    Nonexudative age-related macular degeneration, bilateral, early dry stage    Meibomian gland dysfunction (MGD)    History of COVID-19    Urge incontinence of urine    Dizziness     Advance Care Planning Advance Directive is on file.  ACP discussion was held with the patient during this visit. Patient has an advance directive in EMR which is still valid.             Objective   Vitals:     "24 1534   BP: 131/79   Pulse: 78   Temp: 97 °F (36.1 °C)   TempSrc: Temporal   SpO2: 97%   Weight: 113 kg (249 lb 12.8 oz)   Height: 175.3 cm (69.02\")       Estimated body mass index is 36.87 kg/m² as calculated from the following:    Height as of this encounter: 175.3 cm (69.02\").    Weight as of this encounter: 113 kg (249 lb 12.8 oz).            Does the patient have evidence of cognitive impairment? No                                                                                                Health  Risk Assessment    Smoking Status:  Social History     Tobacco Use   Smoking Status Former    Current packs/day: 0.00    Average packs/day: 1 pack/day for 11.5 years (11.5 ttl pk-yrs)    Types: Cigarettes    Start date: 1958    Quit date: 1969    Years since quittin.4    Passive exposure: Past   Smokeless Tobacco Never     Alcohol Consumption:  Social History     Substance and Sexual Activity   Alcohol Use Yes    Alcohol/week: 6.0 standard drinks of alcohol    Types: 6 Shots of liquor per week    Comment: weekly;  caffeine use- denies       Fall Risk Screen  STEADI Fall Risk Assessment was completed, and patient is at MODERATE risk for falls. Assessment completed on:2024    Depression Screening   Little interest or pleasure in doing things? Not at all   Feeling down, depressed, or hopeless? Not at all   PHQ-2 Total Score 0      Health Habits and Functional and Cognitive Screenin/6/2024    10:03 AM   Functional & Cognitive Status   Do you have difficulty preparing food and eating? No    Do you have difficulty bathing yourself, getting dressed or grooming yourself? No    Do you have difficulty using the toilet? No    Do you have difficulty moving around from place to place? No    Do you have trouble with steps or getting out of a bed or a chair? No    Current Diet Well Balanced Diet    Dental Exam Up to date    Eye Exam Up to date    Exercise (times per week) 5 times per week  "   Current Exercises Include Stationary Bicycling/Spin Class;Treadmill    Do you need help using the phone?  No    Are you deaf or do you have serious difficulty hearing?  Yes   Do you need help to go to places out of walking distance? No    Do you need help shopping? No    Do you need help preparing meals?  No    Do you need help with housework?  No    Do you need help with laundry? No    Do you need help taking your medications? No    Do you need help managing money? No    Do you ever drive or ride in a car without wearing a seat belt? No    Have you felt unusual stress, anger or loneliness in the last month? No    Who do you live with? Spouse    If you need help, do you have trouble finding someone available to you? No    Have you been bothered in the last four weeks by sexual problems? No    Do you have difficulty concentrating, remembering or making decisions? No        Patient-reported           Age-appropriate Screening Schedule:  Refer to the list below for future screening recommendations based on patient's age, sex and/or medical conditions. Orders for these recommended tests are listed in the plan section. The patient has been provided with a written plan.    Health Maintenance List  Health Maintenance   Topic Date Due    DIABETIC FOOT EXAM  04/15/2022    LIPID PANEL  11/20/2024    ANNUAL WELLNESS VISIT  11/27/2024    DIABETIC EYE EXAM  12/04/2024    HEMOGLOBIN A1C  03/04/2025    BMI FOLLOWUP  12/17/2024    COLORECTAL CANCER SCREENING  02/15/2026    TDAP/TD VACCINES (2 - Td or Tdap) 10/21/2032    COVID-19 Vaccine  Completed    RSV Vaccine - Adults  Completed    INFLUENZA VACCINE  Completed    Pneumococcal Vaccine 65+  Completed    ZOSTER VACCINE  Completed    URINE MICROALBUMIN  Discontinued                                                                                                                                                CMS Preventative Services Quick Reference  Risk Factors Identified During  "Encounter  Dental Screening Recommended  Vision Screening Recommended    The above risks/problems have been discussed with the patient.  Pertinent information has been shared with the patient in the After Visit Summary.  An After Visit Summary and PPPS were made available to the patient.    Follow Up:   Next Medicare Wellness visit to be scheduled in 1 year.         Additional E&M Note during same encounter follows:  Patient has additional, significant, and separately identifiable condition(s)/problem(s) that require work above and beyond the Medicare Wellness Visit     Chief Complaint  Medicare Wellness-subsequent and Extremity Weakness    Subjective   HPI  Bill is also being seen today for additional medical problem/s.    He reports continued difficulty and struggles with walking as he has leg weakness after prolonged amount of walking.  He denies having any pain or claudication-like symptoms associated with it, has also had ABIs done within the past year that were normal.                 Objective   Vital Signs:  /79   Pulse 78   Temp 97 °F (36.1 °C) (Temporal)   Ht 175.3 cm (69.02\")   Wt 113 kg (249 lb 12.8 oz)   SpO2 97%   BMI 36.87 kg/m²   Physical Exam  Vitals reviewed.   Constitutional:       General: He is not in acute distress.     Appearance: Normal appearance. He is not toxic-appearing.   HENT:      Head: Normocephalic and atraumatic.   Eyes:      General: No scleral icterus.     Conjunctiva/sclera: Conjunctivae normal.   Skin:     General: Skin is warm and dry.   Neurological:      Mental Status: He is alert and oriented to person, place, and time.   Psychiatric:         Mood and Affect: Mood normal.         Behavior: Behavior normal.                 Assessment and Plan               Medicare annual wellness visit, subsequent    Myalgia, lower leg    Essential hypertension    Controlled type 2 diabetes mellitus without complication, without long-term current use of insulin    Mixed " hyperlipidemia     Body mass index (BMI) 36.0-36.9, adult    Diagnoses and all orders for this visit:    1. Medicare annual wellness visit, subsequent (Primary)    2. Myalgia, lower leg  -     Comprehensive Metabolic Panel; Future  -     CK; Future  -     Magnesium; Future  -     Vitamin D,25-Hydroxy; Future    3. Essential hypertension  Assessment & Plan:        4. Controlled type 2 diabetes mellitus without complication, without long-term current use of insulin  Assessment & Plan:      Orders:  -     Hemoglobin A1c; Future    5. Mixed hyperlipidemia  Assessment & Plan:       Orders:  -     Lipid Panel With / Chol / HDL Ratio; Future    6. Body mass index (BMI) 36.0-36.9, adult  -     Vitamin D,25-Hydroxy; Future      I did advise him to stop his lipitor for at least a week or two to see if his myalgia-like symptoms resolve. WILSON's were normal, also consider progressive weight gain and deconditioning if no improvement off statin.     Diabetes has been at goal, low concern for neuropathy Will repeat A1c prior to next appt     Will plan to have him RTC in about 3mo to see how symptoms are, labs prior                Follow Up   Return in about 3 months (around 3/13/2025).  Patient was given instructions and counseling regarding his condition or for health maintenance advice. Please see specific information pulled into the AVS if appropriate.

## 2024-12-15 VITALS
HEIGHT: 69 IN | WEIGHT: 249.8 LBS | SYSTOLIC BLOOD PRESSURE: 131 MMHG | OXYGEN SATURATION: 97 % | HEART RATE: 78 BPM | BODY MASS INDEX: 37 KG/M2 | DIASTOLIC BLOOD PRESSURE: 79 MMHG | TEMPERATURE: 97 F

## 2024-12-16 ENCOUNTER — LAB (OUTPATIENT)
Dept: LAB | Facility: HOSPITAL | Age: 84
End: 2024-12-16
Payer: MEDICARE

## 2024-12-16 DIAGNOSIS — E55.9 VITAMIN D DEFICIENCY: Primary | ICD-10-CM

## 2024-12-16 PROCEDURE — 80061 LIPID PANEL: CPT | Performed by: STUDENT IN AN ORGANIZED HEALTH CARE EDUCATION/TRAINING PROGRAM

## 2024-12-16 PROCEDURE — 80053 COMPREHEN METABOLIC PANEL: CPT | Performed by: STUDENT IN AN ORGANIZED HEALTH CARE EDUCATION/TRAINING PROGRAM

## 2024-12-16 PROCEDURE — 82306 VITAMIN D 25 HYDROXY: CPT | Performed by: STUDENT IN AN ORGANIZED HEALTH CARE EDUCATION/TRAINING PROGRAM

## 2024-12-16 PROCEDURE — 83036 HEMOGLOBIN GLYCOSYLATED A1C: CPT | Performed by: STUDENT IN AN ORGANIZED HEALTH CARE EDUCATION/TRAINING PROGRAM

## 2024-12-16 PROCEDURE — 82550 ASSAY OF CK (CPK): CPT | Performed by: STUDENT IN AN ORGANIZED HEALTH CARE EDUCATION/TRAINING PROGRAM

## 2024-12-16 PROCEDURE — 83735 ASSAY OF MAGNESIUM: CPT | Performed by: STUDENT IN AN ORGANIZED HEALTH CARE EDUCATION/TRAINING PROGRAM

## 2025-01-03 DIAGNOSIS — E55.9 VITAMIN D DEFICIENCY: ICD-10-CM

## 2025-01-03 RX ORDER — ERGOCALCIFEROL (VITAMIN D2) 10 MCG
400 TABLET ORAL DAILY
Qty: 90 TABLET | Refills: 1 | Status: SHIPPED | OUTPATIENT
Start: 2025-01-03

## 2025-02-03 ENCOUNTER — OFFICE VISIT (OUTPATIENT)
Dept: CARDIOLOGY | Facility: CLINIC | Age: 85
End: 2025-02-03
Payer: MEDICARE

## 2025-02-03 VITALS
DIASTOLIC BLOOD PRESSURE: 72 MMHG | BODY MASS INDEX: 36.53 KG/M2 | HEIGHT: 69 IN | OXYGEN SATURATION: 94 % | HEART RATE: 68 BPM | WEIGHT: 246.6 LBS | SYSTOLIC BLOOD PRESSURE: 132 MMHG

## 2025-02-03 DIAGNOSIS — E78.2 MIXED HYPERLIPIDEMIA: ICD-10-CM

## 2025-02-03 DIAGNOSIS — I25.10 CORONARY ARTERY DISEASE INVOLVING NATIVE CORONARY ARTERY OF NATIVE HEART WITHOUT ANGINA PECTORIS: ICD-10-CM

## 2025-02-03 DIAGNOSIS — I35.0 NONRHEUMATIC AORTIC (VALVE) STENOSIS: Primary | ICD-10-CM

## 2025-02-03 PROCEDURE — 3078F DIAST BP <80 MM HG: CPT | Performed by: INTERNAL MEDICINE

## 2025-02-03 PROCEDURE — 99214 OFFICE O/P EST MOD 30 MIN: CPT | Performed by: INTERNAL MEDICINE

## 2025-02-03 PROCEDURE — G2211 COMPLEX E/M VISIT ADD ON: HCPCS | Performed by: INTERNAL MEDICINE

## 2025-02-03 PROCEDURE — 1159F MED LIST DOCD IN RCRD: CPT | Performed by: INTERNAL MEDICINE

## 2025-02-03 PROCEDURE — 3075F SYST BP GE 130 - 139MM HG: CPT | Performed by: INTERNAL MEDICINE

## 2025-02-03 PROCEDURE — 1160F RVW MEDS BY RX/DR IN RCRD: CPT | Performed by: INTERNAL MEDICINE

## 2025-02-03 RX ORDER — SOLIFENACIN SUCCINATE 10 MG/1
1 TABLET, FILM COATED ORAL DAILY
COMMUNITY
Start: 2025-01-17

## 2025-02-03 NOTE — PROGRESS NOTES
[facility]      Patient Name: Shady Newell  :1940  Age: 84 y.o.  Encounter Provider:  Nato Michaud Jr, MD      Chief Complaint:   Chief Complaint   Patient presents with    Coronary artery disease involving native coronary artery of         HPI  Shady Newell is a 84 y.o. male past medical history of coronary artery disease status post PCI LAD, hypertension and dyslipidemia who presents for routine follow-up.     Last clinic visit note: Patient had an episode at home in the middle of December where he rolled over in bed and had a sensation of the room spinning.  He was brought into the hospital and evaluated due to his cardiac history.  Orthostatics were negative.  A murmur was noted on exam and he has previously followed with [facility].  He has a history of PCI to LAD and had been doing well from an ischemic heart disease perspective but there was no evaluation of murmur in the system.  We did an echocardiogram showing mild to moderate aortic stenosis.  Patient was sent home for an evaluation and vestibular PT.  He has not had any further vertiginous symptoms.  Blood pressure is well-controlled on home blood pressure log.  He denies angina or shortness of breath with activity.  He goes to the gym 5 days/week and works out on the treadmill for 15 minutes as well as the stairmaster for 15 minutes.  No orthopnea, PND or edema.  No palpitations or syncope.  Last LDL 46.    Patient has been complaining of leg fatigue especially with activity.  Stress study was performed in July that showed no evidence of ischemia.  ABIs were performed in the fall which showed normal arterial blood flow to bilateral lower extremities.  Dr. Izaguirre took him off of atorvastatin in December but he has no change in leg fatigue.  Despite these complaints he gets on the treadmill and the elliptical for a total of 30 minutes each day.  He has no symptoms with this activity.  He tells me if  "he has to walk around the mall though he has to stop multiple times due to leg fatigue.  He denies any myalgias.  He has no chest pain or shortness of air with activity.  No orthopnea, PND or edema.  Last echocardiogram was December 2023 which showed mild to moderate aortic stenosis.    The following portions of the patient's history were reviewed and updated as appropriate: allergies, current medications, past family history, past medical history, past social history, past surgical history and problem list.      Review of Systems   Constitutional: Negative for chills and fever.   HENT:  Negative for hoarse voice and sore throat.    Eyes:  Negative for double vision and photophobia.   Cardiovascular:  Negative for chest pain, leg swelling, near-syncope, orthopnea, palpitations, paroxysmal nocturnal dyspnea and syncope.   Respiratory:  Negative for cough and wheezing.    Skin:  Negative for poor wound healing and rash.   Musculoskeletal:  Negative for arthritis and joint swelling.   Gastrointestinal:  Negative for bloating, abdominal pain, hematemesis and hematochezia.   Neurological:  Negative for dizziness and focal weakness.   Psychiatric/Behavioral:  Negative for depression and suicidal ideas.        OBJECTIVE:   Vital Signs  Vitals:    02/03/25 0900   BP: 132/72   Pulse: 68   SpO2: 94%     Estimated body mass index is 36.42 kg/m² as calculated from the following:    Height as of this encounter: 175.3 cm (69\").    Weight as of this encounter: 112 kg (246 lb 9.6 oz).    Vitals reviewed.   Constitutional:       Appearance: Healthy appearance. Not in distress.   Neck:      Vascular: No JVR. JVD normal.   Pulmonary:      Effort: Pulmonary effort is normal.      Breath sounds: Normal breath sounds. No wheezing. No rhonchi. No rales.   Chest:      Chest wall: Not tender to palpatation.   Cardiovascular:      PMI at left midclavicular line. Normal rate. Regular rhythm. Normal S1. Normal S2.       Murmurs: There is no " murmur.      No gallop.  No click. No rub.   Pulses:     Intact distal pulses.   Edema:     Peripheral edema absent.   Abdominal:      General: Bowel sounds are normal.      Palpations: Abdomen is soft.      Tenderness: There is no abdominal tenderness.   Musculoskeletal: Normal range of motion.         General: No tenderness. Skin:     General: Skin is warm and dry.   Neurological:      General: No focal deficit present.      Mental Status: Alert and oriented to person, place and time.       Procedures    Lipid Panel          12/16/2024    06:12   Lipid Panel   Total Cholesterol 92    Triglycerides 80    HDL Cholesterol 38    VLDL Cholesterol 16    LDL Cholesterol  38         BUN   Date Value Ref Range Status   12/16/2024 13 8 - 23 mg/dL Final     Creatinine   Date Value Ref Range Status   12/16/2024 1.14 0.76 - 1.27 mg/dL Final     Potassium   Date Value Ref Range Status   12/16/2024 4.2 3.5 - 5.2 mmol/L Final     ALT (SGPT)   Date Value Ref Range Status   12/16/2024 28 1 - 41 U/L Final     AST (SGOT)   Date Value Ref Range Status   12/16/2024 27 1 - 40 U/L Final           ASSESSMENT:     84-year-old male with known cardiac history presents for hospital follow-up of near syncope      PLAN OF CARE:     Near syncope -no further episodes.  Cardiac testing noted.  Monitor clinical symptoms.  Leg fatigue -normal stress study and ABIs.  No change with holding atorvastatin.  Restart atorvastatin.  Start to increase daily activity.  He will start to add incline to his treadmill walking.  Given the nonrheumatic aortic stenosis we will repeat an echocardiogram.  Coronary artery disease -no angina with good functional capacity.  Blood pressure and heart rate are well-controlled today in clinic.  Continue aspirin and statin.  Last LDL 38.  Nonrheumatic aortic stenosis -mild to moderate on echocardiogram.  Repeat echo.    Return to clinic 6 months             Discharge Medications            Accurate as of February 3, 2025   9:02 AM. If you have any questions, ask your nurse or doctor.                Continue These Medications        Instructions Start Date   acetaminophen 650 MG 8 hr tablet  Commonly known as: TYLENOL       aspirin 81 MG EC tablet   81 mg, Daily      atorvastatin 10 MG tablet  Commonly known as: LIPITOR   TAKE 1 TABLET BY MOUTH EVERY DAY AT NIGHT      carvedilol 12.5 MG tablet  Commonly known as: Coreg   12.5 mg, Oral, 2 Times Daily With Meals      famotidine 20 MG tablet  Commonly known as: PEPCID   20 mg, Oral, Every Night at Bedtime      finasteride 5 MG tablet  Commonly known as: PROSCAR   5 mg, Oral, Daily      glimepiride 2 MG tablet  Commonly known as: AMARYL   2 mg, Oral, Every Morning Before Breakfast      hydroCHLOROthiazide 25 MG tablet   25 mg, Oral, Daily      loratadine 10 MG tablet  Commonly known as: CLARITIN   TAKE 1 TABLET BY MOUTH EVERY DAY AS NEEDED FOR ALLERGIES      multivitamin with minerals tablet tablet   1 tablet, Daily      nitroglycerin 0.4 MG SL tablet  Commonly known as: NITROSTAT   0.4 mg, Sublingual, Every 5 Minutes PRN, Take no more than 3 doses in 15 minutes.      polyethyl glycol-propyl glycol 0.4-0.3 % solution ophthalmic solution (artificial tears)  Commonly known as: SYSTANE   Daily PRN      PRESERVISION AREDS 2+MULTI VIT PO       ramipril 10 MG capsule  Commonly known as: ALTACE   10 mg, Oral, 2 Times Daily      solifenacin 10 MG tablet  Commonly known as: VESICARE   1 tablet, Daily      tamsulosin 0.4 MG capsule 24 hr capsule  Commonly known as: FLOMAX   0.4 mg, Oral, Nightly      Vitamin D3 20 MCG (800 UNIT) tablet   1 tablet, Oral, Daily      Vitamin D (Cholecalciferol) 10 MCG (400 UNIT) tablet  Commonly known as: CHOLECALCIFEROL   400 Units, Oral, Daily               Thank you for allowing me to participate in the care of your patient,      Sincerely,   Nato Michaud MD  [facility]  02/03/25  09:02 EST

## 2025-02-17 ENCOUNTER — HOSPITAL ENCOUNTER (OUTPATIENT)
Dept: CARDIOLOGY | Facility: HOSPITAL | Age: 85
Discharge: HOME | End: 2025-02-17
Admitting: INTERNAL MEDICINE
Payer: MEDICARE

## 2025-02-17 VITALS
DIASTOLIC BLOOD PRESSURE: 82 MMHG | BODY MASS INDEX: 36.43 KG/M2 | HEART RATE: 70 BPM | SYSTOLIC BLOOD PRESSURE: 142 MMHG | HEIGHT: 69 IN | WEIGHT: 246 LBS

## 2025-02-17 DIAGNOSIS — I35.0 NONRHEUMATIC AORTIC (VALVE) STENOSIS: ICD-10-CM

## 2025-02-17 LAB
AORTIC DIMENSIONLESS INDEX: 0.31 (DI)
ASCENDING AORTA: 3.9 CM
AV MEAN PRESS GRAD SYS DOP V1V2: 23.2 MMHG
AV VMAX SYS DOP: 306.7 CM/SEC
BH CV ECHO MEAS - ACS: 1.37 CM
BH CV ECHO MEAS - AO MAX PG: 37.6 MMHG
BH CV ECHO MEAS - AO ROOT AREA (BSA CORRECTED): 1.7 CM2
BH CV ECHO MEAS - AO ROOT DIAM: 2.9 CM
BH CV ECHO MEAS - AO V2 VTI: 75.7 CM
BH CV ECHO MEAS - AVA(I,D): 1.35 CM2
BH CV ECHO MEAS - EDV(CUBED): 125 ML
BH CV ECHO MEAS - EDV(MOD-SP2): 105 ML
BH CV ECHO MEAS - EDV(MOD-SP4): 121 ML
BH CV ECHO MEAS - EF(MOD-SP2): 59 %
BH CV ECHO MEAS - EF(MOD-SP4): 62 %
BH CV ECHO MEAS - ESV(CUBED): 33.5 ML
BH CV ECHO MEAS - ESV(MOD-SP2): 43 ML
BH CV ECHO MEAS - ESV(MOD-SP4): 46 ML
BH CV ECHO MEAS - FS: 35.5 %
BH CV ECHO MEAS - IVS/LVPW: 1.11 CM
BH CV ECHO MEAS - IVSD: 1 CM
BH CV ECHO MEAS - LV DIASTOLIC VOL/BSA (35-75): 53.6 CM2
BH CV ECHO MEAS - LV MASS(C)D: 169.9 GRAMS
BH CV ECHO MEAS - LV MAX PG: 4.6 MMHG
BH CV ECHO MEAS - LV MEAN PG: 2.6 MMHG
BH CV ECHO MEAS - LV SYSTOLIC VOL/BSA (12-30): 20.4 CM2
BH CV ECHO MEAS - LV V1 MAX: 107.7 CM/SEC
BH CV ECHO MEAS - LV V1 VTI: 23.7 CM
BH CV ECHO MEAS - LVIDD: 5 CM
BH CV ECHO MEAS - LVIDS: 3.2 CM
BH CV ECHO MEAS - LVOT AREA: 4.3 CM2
BH CV ECHO MEAS - LVOT DIAM: 2.35 CM
BH CV ECHO MEAS - LVPWD: 0.9 CM
BH CV ECHO MEAS - MR MAX PG: 39.9 MMHG
BH CV ECHO MEAS - MR MAX VEL: 315.8 CM/SEC
BH CV ECHO MEAS - MV A DUR: 0.13 SEC
BH CV ECHO MEAS - MV A MAX VEL: 106.5 CM/SEC
BH CV ECHO MEAS - MV DEC SLOPE: 549.4 CM/SEC2
BH CV ECHO MEAS - MV DEC TIME: 0.12 SEC
BH CV ECHO MEAS - MV E MAX VEL: 74.7 CM/SEC
BH CV ECHO MEAS - MV E/A: 0.7
BH CV ECHO MEAS - MV MAX PG: 3.8 MMHG
BH CV ECHO MEAS - MV MEAN PG: 1.82 MMHG
BH CV ECHO MEAS - MV P1/2T: 44.2 MSEC
BH CV ECHO MEAS - MV V2 VTI: 23 CM
BH CV ECHO MEAS - MVA(P1/2T): 5 CM2
BH CV ECHO MEAS - MVA(VTI): 4.5 CM2
BH CV ECHO MEAS - PA V2 MAX: 100.4 CM/SEC
BH CV ECHO MEAS - PULM A REVS DUR: 0.12 SEC
BH CV ECHO MEAS - PULM A REVS VEL: 24.1 CM/SEC
BH CV ECHO MEAS - PULM DIAS VEL: 27.3 CM/SEC
BH CV ECHO MEAS - PULM S/D: 1.4
BH CV ECHO MEAS - PULM SYS VEL: 38.2 CM/SEC
BH CV ECHO MEAS - QP/QS: 0.49
BH CV ECHO MEAS - RAP SYSTOLE: 3 MMHG
BH CV ECHO MEAS - RV MAX PG: 1.23 MMHG
BH CV ECHO MEAS - RV V1 MAX: 55.3 CM/SEC
BH CV ECHO MEAS - RV V1 VTI: 11.2 CM
BH CV ECHO MEAS - RVOT DIAM: 2.39 CM
BH CV ECHO MEAS - RVSP: 30 MMHG
BH CV ECHO MEAS - SV(LVOT): 102.6 ML
BH CV ECHO MEAS - SV(MOD-SP2): 62 ML
BH CV ECHO MEAS - SV(MOD-SP4): 75 ML
BH CV ECHO MEAS - SV(RVOT): 50.2 ML
BH CV ECHO MEAS - SVI(LVOT): 45.5 ML/M2
BH CV ECHO MEAS - SVI(MOD-SP2): 27.5 ML/M2
BH CV ECHO MEAS - SVI(MOD-SP4): 33.2 ML/M2
BH CV ECHO MEAS - TAPSE (>1.6): 2.7 CM
BH CV ECHO MEAS - TR MAX PG: 26.9 MMHG
BH CV ECHO MEAS - TR MAX VEL: 259.4 CM/SEC
BH CV XLRA - RV BASE: 4.1 CM
BH CV XLRA - RV LENGTH: 6.7 CM
BH CV XLRA - RV MID: 2.6 CM
LEFT ATRIUM VOLUME INDEX: 30 ML/M2
LV EF BIPLANE MOD: 59.9 %
SINUS: 3.7 CM
STJ: 3.1 CM

## 2025-02-17 PROCEDURE — 93306 TTE W/DOPPLER COMPLETE: CPT

## 2025-02-17 PROCEDURE — 93306 TTE W/DOPPLER COMPLETE: CPT | Performed by: INTERNAL MEDICINE

## 2025-02-17 PROCEDURE — 25510000001 PERFLUTREN 6.52 MG/ML SUSPENSION 2 ML VIAL: Performed by: INTERNAL MEDICINE

## 2025-02-17 RX ADMIN — PERFLUTREN 1 ML: 6.52 INJECTION, SUSPENSION INTRAVENOUS at 08:45

## 2025-02-17 NOTE — PROGRESS NOTES
Please let the patient know that echocardiogram shows stable aortic stenosis.  No further testing required at this time.

## 2025-02-18 ENCOUNTER — TELEPHONE (OUTPATIENT)
Dept: CARDIOLOGY | Facility: CLINIC | Age: 85
End: 2025-02-18
Payer: MEDICARE

## 2025-02-18 NOTE — TELEPHONE ENCOUNTER
Pt returned your call regarding his test results.  Thanks/[facility]    # [phone]

## 2025-02-26 DIAGNOSIS — I10 ESSENTIAL HYPERTENSION: Chronic | ICD-10-CM

## 2025-02-26 DIAGNOSIS — E11.9 CONTROLLED TYPE 2 DIABETES MELLITUS WITHOUT COMPLICATION, WITHOUT LONG-TERM CURRENT USE OF INSULIN: ICD-10-CM

## 2025-02-26 RX ORDER — HYDROCHLOROTHIAZIDE 25 MG/1
25 TABLET ORAL DAILY
Qty: 90 TABLET | Refills: 1 | Status: SHIPPED | OUTPATIENT
Start: 2025-02-26

## 2025-02-26 RX ORDER — GLIMEPIRIDE 2 MG/1
2 TABLET ORAL
Qty: 90 TABLET | Refills: 1 | Status: SHIPPED | OUTPATIENT
Start: 2025-02-26

## 2025-03-14 ENCOUNTER — OFFICE VISIT (OUTPATIENT)
Dept: INTERNAL MEDICINE | Facility: CLINIC | Age: 85
End: 2025-03-14
Payer: MEDICARE

## 2025-03-14 VITALS
OXYGEN SATURATION: 94 % | SYSTOLIC BLOOD PRESSURE: 134 MMHG | DIASTOLIC BLOOD PRESSURE: 78 MMHG | HEART RATE: 78 BPM | HEIGHT: 69 IN | WEIGHT: 248 LBS | TEMPERATURE: 97.3 F | BODY MASS INDEX: 36.73 KG/M2

## 2025-03-14 DIAGNOSIS — E11.9 CONTROLLED TYPE 2 DIABETES MELLITUS WITHOUT COMPLICATION, WITHOUT LONG-TERM CURRENT USE OF INSULIN: Primary | ICD-10-CM

## 2025-03-14 DIAGNOSIS — I10 ESSENTIAL HYPERTENSION: ICD-10-CM

## 2025-03-14 DIAGNOSIS — R53.1 WEAKNESS: ICD-10-CM

## 2025-03-14 NOTE — PROGRESS NOTES
"Chief Complaint  Hypertension    Subjective        Shady Newell presents to [facility]  History of Present Illness    Presents to clinic today for follow-up    He has no acute complaints or concerns today, he is doing well since last visit.  He continues to have some leg weakness however he is increasing his exercise by going on the treadmill at the gym    He does continue to have arthritis like pain in his hands.  He previously followed with hand surgery who recommended surgical intervention but he did not want to be in a cast for over 6 months        Objective   Vital Signs:  /78   Pulse 78   Temp 97.3 °F (36.3 °C) (Temporal)   Ht 175.3 cm (69\")   Wt 112 kg (248 lb)   SpO2 94%   BMI 36.62 kg/m²   Estimated body mass index is 36.62 kg/m² as calculated from the following:    Height as of this encounter: 175.3 cm (69\").    Weight as of this encounter: 112 kg (248 lb).    Class 2 Severe Obesity (BMI >=35 and <=39.9). Obesity-related health conditions include the following: hypertension, impaired fasting glucose, osteoarthritis, and peripheral vascular disease. Obesity is unchanged. BMI is is above average; BMI management plan is completed. We discussed increasing exercise.      Physical Exam  Vitals reviewed.   Constitutional:       General: He is not in acute distress.     Appearance: Normal appearance. He is not toxic-appearing.   HENT:      Head: Normocephalic and atraumatic.   Eyes:      General: No scleral icterus.     Conjunctiva/sclera: Conjunctivae normal.   Skin:     General: Skin is warm and dry.   Neurological:      Mental Status: He is alert and oriented to person, place, and time.   Psychiatric:         Mood and Affect: Mood normal.         Behavior: Behavior normal.        Result Review :                Assessment and Plan   Diagnoses and all orders for this visit:    1. Controlled type 2 diabetes mellitus without complication, without long-term current " use of insulin (Primary)  -     Microalbumin / Creatinine Urine Ratio - Urine, Clean Catch; Future  -     Comprehensive Metabolic Panel; Future  -     Hemoglobin A1c; Future    2. Essential hypertension    3. Weakness      Reviewed cardiology office visit    He appears to be doing well, clinically stable chronic conditions listed above.  I did recommend he continue to increase his exercise duration as I suspect deconditioning is large, leg weakness    Blood pressure is stable and at goal today, continue current regimen    I will have him return to clinic in about 6 months with labs prior         Follow Up   Return in about 6 months (around 9/14/2025).  Patient was given instructions and counseling regarding his condition or for health maintenance advice. Please see specific information pulled into the AVS if appropriate.

## 2025-04-06 DIAGNOSIS — K21.9 GASTROESOPHAGEAL REFLUX DISEASE WITHOUT ESOPHAGITIS: ICD-10-CM

## 2025-04-07 RX ORDER — FAMOTIDINE 20 MG/1
20 TABLET, FILM COATED ORAL
Qty: 90 TABLET | Refills: 1 | Status: SHIPPED | OUTPATIENT
Start: 2025-04-07

## 2025-04-08 DIAGNOSIS — I10 ESSENTIAL HYPERTENSION: Chronic | ICD-10-CM

## 2025-04-08 RX ORDER — CARVEDILOL 12.5 MG/1
12.5 TABLET ORAL 2 TIMES DAILY WITH MEALS
Qty: 180 TABLET | Refills: 1 | Status: SHIPPED | OUTPATIENT
Start: 2025-04-08

## 2025-06-30 DIAGNOSIS — I10 ESSENTIAL HYPERTENSION: Chronic | ICD-10-CM

## 2025-06-30 RX ORDER — RAMIPRIL 10 MG/1
10 CAPSULE ORAL DAILY
Qty: 90 CAPSULE | Refills: 1 | Status: SHIPPED | OUTPATIENT
Start: 2025-06-30

## 2025-07-03 NOTE — TELEPHONE ENCOUNTER
Rx Refill Note  Requested Prescriptions     Pending Prescriptions Disp Refills    glimepiride (AMARYL) 2 MG tablet [Pharmacy Med Name: GLIMEPIRIDE 2 MG TABLET] 90 tablet 1     Sig: TAKE 1 TABLET BY MOUTH EVERY DAY BEFORE BREAKFAST    glimepiride (AMARYL) 1 MG tablet [Pharmacy Med Name: GLIMEPIRIDE 1 MG TABLET] 90 tablet 1     Sig: TAKE 1 TABLET BY MOUTH EVERY DAY BEFORE BREAKFAST      Last office visit with prescribing clinician: 11/27/2023   Last telemedicine visit with prescribing clinician: Visit date not found   Next office visit with prescribing clinician: 3/27/2024                         Would you like a call back once the refill request has been completed: [] Yes [] No    If the office needs to give you a call back, can they leave a voicemail: [] Yes [] No    Italia Castillo LPN  12/04/23, 10:26 EST   Health Care Proxy (HCP) radha schumacher/Health Care Proxy (HCP)

## 2025-07-31 ENCOUNTER — TELEPHONE (OUTPATIENT)
Dept: INTERNAL MEDICINE | Facility: CLINIC | Age: 85
End: 2025-07-31
Payer: MEDICARE

## 2025-07-31 DIAGNOSIS — H91.93 BILATERAL HEARING LOSS, UNSPECIFIED HEARING LOSS TYPE: Primary | ICD-10-CM

## 2025-07-31 NOTE — TELEPHONE ENCOUNTER
[facility] is wanting to know if they can have a referral faxed over on this patient for a hearing test?   Fax number [phone].  Phone number [phone]

## 2025-08-04 ENCOUNTER — OFFICE VISIT (OUTPATIENT)
Dept: CARDIOLOGY | Age: 85
End: 2025-08-04
Payer: MEDICARE

## 2025-08-04 VITALS
OXYGEN SATURATION: 94 % | HEART RATE: 66 BPM | HEIGHT: 69 IN | DIASTOLIC BLOOD PRESSURE: 90 MMHG | WEIGHT: 245.8 LBS | SYSTOLIC BLOOD PRESSURE: 140 MMHG | BODY MASS INDEX: 36.4 KG/M2

## 2025-08-04 DIAGNOSIS — E11.9 CONTROLLED TYPE 2 DIABETES MELLITUS WITHOUT COMPLICATION, WITHOUT LONG-TERM CURRENT USE OF INSULIN: ICD-10-CM

## 2025-08-04 DIAGNOSIS — E78.2 MIXED HYPERLIPIDEMIA: ICD-10-CM

## 2025-08-04 DIAGNOSIS — I25.10 CORONARY ARTERY DISEASE INVOLVING NATIVE CORONARY ARTERY OF NATIVE HEART WITHOUT ANGINA PECTORIS: ICD-10-CM

## 2025-08-04 DIAGNOSIS — I35.0 NONRHEUMATIC AORTIC (VALVE) STENOSIS: Primary | ICD-10-CM

## 2025-08-22 DIAGNOSIS — I10 ESSENTIAL HYPERTENSION: Chronic | ICD-10-CM

## 2025-08-22 DIAGNOSIS — E11.9 CONTROLLED TYPE 2 DIABETES MELLITUS WITHOUT COMPLICATION, WITHOUT LONG-TERM CURRENT USE OF INSULIN: ICD-10-CM

## 2025-08-22 RX ORDER — HYDROCHLOROTHIAZIDE 25 MG/1
25 TABLET ORAL DAILY
Qty: 90 TABLET | Refills: 1 | Status: SHIPPED | OUTPATIENT
Start: 2025-08-22

## 2025-08-22 RX ORDER — GLIMEPIRIDE 2 MG/1
2 TABLET ORAL
Qty: 90 TABLET | Refills: 1 | Status: SHIPPED | OUTPATIENT
Start: 2025-08-22

## (undated) DEVICE — A2000 MULTI-USE SYRINGE KIT, P/N 701277-003KIT CONTENTS: 100ML CONTRAST RESERVOIR AND TUBING WITH CONTRAST SPIKE AND CLAMP: Brand: A2000 MULTI-USE SYRINGE KIT

## (undated) DEVICE — GUIDE CATHETER: Brand: MACH1™

## (undated) DEVICE — NC TREK CORONARY DILATATION CATHETER 3.5 MM X 20 MM / RAPID-EXCHANGE: Brand: NC TREK

## (undated) DEVICE — PK CATH CARD 10

## (undated) DEVICE — TREK CORONARY DILATATION CATHETER 3.0 MM X 15 MM / RAPID-EXCHANGE: Brand: TREK

## (undated) DEVICE — GUIDELINER CATHETERS ARE INTENDED TO BE USED IN CONJUNCTION WITH GUIDE CATHETERS TO ACCESS DISCRETE REGIONS OF THE CORONARY AND/OR PERIPHERAL VASCULATURE, AND TO FACILITATE PLACEMENT OF INTERVENTIONAL DEVICES.: Brand: GUIDELINER® V3 CATHETER

## (undated) DEVICE — GW LUGE .014 182 CM

## (undated) DEVICE — CATH DIAG EXPO .045 FL3  5F 100CM

## (undated) DEVICE — HI-TORQUE WHISPER ES GUIDE WIRE .014 STRAIGHTTIP 3.0 CM X 190 CM: Brand: HI-TORQUE WHISPER

## (undated) DEVICE — TREK CORONARY DILATATION CATHETER 2.50 MM X 12 MM / RAPID-EXCHANGE: Brand: TREK

## (undated) DEVICE — CANNULA,ADULT,SOFT-TOUCH,7'TUBE,UC: Brand: PENDING

## (undated) DEVICE — DEV COMP RAD PRELUDESYNC 24CM

## (undated) DEVICE — NC TREK CORONARY DILATATION CATHETER 3.5 MM X 15 MM / RAPID-EXCHANGE: Brand: NC TREK

## (undated) DEVICE — GW INQWIRE FC PTFE STD J/1.5 .035 260

## (undated) DEVICE — CATH DIAG EXPO M/ PK 5F FL4/FR4 PIG

## (undated) DEVICE — MODEL AT P54, P/N 700608-035KIT CONTENTS: HAND CONTROLLER, 3-WAY HIGH-PRESSURE STOPCOCK WITH ROTATING END AND PREMIUM HIGH-PRESSURE TUBING: Brand: ANGIOTOUCH® KIT

## (undated) DEVICE — MINI TREK CORONARY DILATATION CATHETER 2.0 MM X 12 MM / RAPID-EXCHANGE: Brand: MINI TREK

## (undated) DEVICE — MODEL BT2000 P/N 700287-012KIT CONTENTS: MANIFOLD WITH SALINE AND CONTRAST PORTS, SALINE TUBING WITH SPIKE AND HAND SYRINGE, TRANSDUCER: Brand: BT2000 AUTOMATED MANIFOLD KIT

## (undated) DEVICE — GLIDESHEATH SLENDER STAINLESS STEEL KIT: Brand: GLIDESHEATH SLENDER